# Patient Record
Sex: MALE | Race: WHITE | Employment: FULL TIME | URBAN - NONMETROPOLITAN AREA
[De-identification: names, ages, dates, MRNs, and addresses within clinical notes are randomized per-mention and may not be internally consistent; named-entity substitution may affect disease eponyms.]

---

## 2021-02-13 ENCOUNTER — APPOINTMENT (OUTPATIENT)
Dept: GENERAL RADIOLOGY | Age: 76
DRG: 480 | End: 2021-02-13
Payer: MEDICARE

## 2021-02-13 ENCOUNTER — ANESTHESIA EVENT (OUTPATIENT)
Dept: OPERATING ROOM | Age: 76
DRG: 480 | End: 2021-02-13
Payer: MEDICARE

## 2021-02-13 ENCOUNTER — HOSPITAL ENCOUNTER (INPATIENT)
Age: 76
LOS: 9 days | Discharge: NURSING, REHAB OR LONG-TERM CARE | DRG: 480 | End: 2021-02-22
Attending: EMERGENCY MEDICINE | Admitting: INTERNAL MEDICINE
Payer: MEDICARE

## 2021-02-13 ENCOUNTER — ANESTHESIA (OUTPATIENT)
Dept: OPERATING ROOM | Age: 76
DRG: 480 | End: 2021-02-13
Payer: MEDICARE

## 2021-02-13 VITALS
DIASTOLIC BLOOD PRESSURE: 83 MMHG | TEMPERATURE: 97.2 F | OXYGEN SATURATION: 99 % | RESPIRATION RATE: 11 BRPM | SYSTOLIC BLOOD PRESSURE: 146 MMHG
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
## 2021-02-13 DIAGNOSIS — S72.002A CLOSED FRACTURE OF LEFT HIP, INITIAL ENCOUNTER (HCC): Primary | ICD-10-CM

## 2021-02-13 DIAGNOSIS — I10 ESSENTIAL HYPERTENSION: ICD-10-CM

## 2021-02-13 PROBLEM — R73.9 HYPERGLYCEMIA: Status: ACTIVE | Noted: 2021-02-13

## 2021-02-13 LAB
ABO/RH: NORMAL
ALBUMIN SERPL-MCNC: 3.8 G/DL (ref 3.5–5.2)
ALP BLD-CCNC: 58 U/L (ref 40–130)
ALT SERPL-CCNC: 7 U/L (ref 5–41)
ANION GAP SERPL CALCULATED.3IONS-SCNC: 4 MMOL/L (ref 7–19)
ANTIBODY SCREEN: NORMAL
AST SERPL-CCNC: 13 U/L (ref 5–40)
BASOPHILS ABSOLUTE: 0 K/UL (ref 0–0.2)
BASOPHILS RELATIVE PERCENT: 0.4 % (ref 0–1)
BILIRUB SERPL-MCNC: 0.3 MG/DL (ref 0.2–1.2)
BUN BLDV-MCNC: 19 MG/DL (ref 8–23)
CALCIUM SERPL-MCNC: 8.9 MG/DL (ref 8.8–10.2)
CHLORIDE BLD-SCNC: 102 MMOL/L (ref 98–111)
CO2: 30 MMOL/L (ref 22–29)
CREAT SERPL-MCNC: 0.8 MG/DL (ref 0.5–1.2)
EOSINOPHILS ABSOLUTE: 0.2 K/UL (ref 0–0.6)
EOSINOPHILS RELATIVE PERCENT: 1.9 % (ref 0–5)
GFR AFRICAN AMERICAN: >59
GFR NON-AFRICAN AMERICAN: >60
GLUCOSE BLD-MCNC: 128 MG/DL (ref 74–109)
HBA1C MFR BLD: 5.6 % (ref 4–6)
HCT VFR BLD CALC: 43.5 % (ref 42–52)
HEMOGLOBIN: 13.9 G/DL (ref 14–18)
IMMATURE GRANULOCYTES #: 0.1 K/UL
INR BLD: 1.07 (ref 0.88–1.18)
LIPASE: 11 U/L (ref 13–60)
LYMPHOCYTES ABSOLUTE: 1.4 K/UL (ref 1.1–4.5)
LYMPHOCYTES RELATIVE PERCENT: 13 % (ref 20–40)
MCH RBC QN AUTO: 31.6 PG (ref 27–31)
MCHC RBC AUTO-ENTMCNC: 32 G/DL (ref 33–37)
MCV RBC AUTO: 98.9 FL (ref 80–94)
MONOCYTES ABSOLUTE: 0.5 K/UL (ref 0–0.9)
MONOCYTES RELATIVE PERCENT: 4.7 % (ref 0–10)
NEUTROPHILS ABSOLUTE: 8.4 K/UL (ref 1.5–7.5)
NEUTROPHILS RELATIVE PERCENT: 79.4 % (ref 50–65)
PDW BLD-RTO: 12.2 % (ref 11.5–14.5)
PLATELET # BLD: 171 K/UL (ref 130–400)
PMV BLD AUTO: 10.2 FL (ref 9.4–12.4)
POTASSIUM SERPL-SCNC: 3.8 MMOL/L (ref 3.5–5)
PROTHROMBIN TIME: 13.9 SEC (ref 12–14.6)
RBC # BLD: 4.4 M/UL (ref 4.7–6.1)
REASON FOR REJECTION: NORMAL
REJECTED TEST: NORMAL
SARS-COV-2, NAAT: NOT DETECTED
SODIUM BLD-SCNC: 136 MMOL/L (ref 136–145)
TOTAL PROTEIN: 7.3 G/DL (ref 6.6–8.7)
WBC # BLD: 10.6 K/UL (ref 4.8–10.8)

## 2021-02-13 PROCEDURE — 83036 HEMOGLOBIN GLYCOSYLATED A1C: CPT

## 2021-02-13 PROCEDURE — 7100000000 HC PACU RECOVERY - FIRST 15 MIN: Performed by: ORTHOPAEDIC SURGERY

## 2021-02-13 PROCEDURE — 2580000003 HC RX 258: Performed by: INTERNAL MEDICINE

## 2021-02-13 PROCEDURE — 3600000004 HC SURGERY LEVEL 4 BASE: Performed by: ORTHOPAEDIC SURGERY

## 2021-02-13 PROCEDURE — 85610 PROTHROMBIN TIME: CPT

## 2021-02-13 PROCEDURE — C1713 ANCHOR/SCREW BN/BN,TIS/BN: HCPCS | Performed by: ORTHOPAEDIC SURGERY

## 2021-02-13 PROCEDURE — 36415 COLL VENOUS BLD VENIPUNCTURE: CPT

## 2021-02-13 PROCEDURE — C1769 GUIDE WIRE: HCPCS | Performed by: ORTHOPAEDIC SURGERY

## 2021-02-13 PROCEDURE — U0002 COVID-19 LAB TEST NON-CDC: HCPCS

## 2021-02-13 PROCEDURE — 2580000003 HC RX 258: Performed by: ORTHOPAEDIC SURGERY

## 2021-02-13 PROCEDURE — 73502 X-RAY EXAM HIP UNI 2-3 VIEWS: CPT

## 2021-02-13 PROCEDURE — 3700000000 HC ANESTHESIA ATTENDED CARE: Performed by: ORTHOPAEDIC SURGERY

## 2021-02-13 PROCEDURE — 2709999900 HC NON-CHARGEABLE SUPPLY: Performed by: ORTHOPAEDIC SURGERY

## 2021-02-13 PROCEDURE — 2500000003 HC RX 250 WO HCPCS: Performed by: ANESTHESIOLOGY

## 2021-02-13 PROCEDURE — 85025 COMPLETE CBC W/AUTO DIFF WBC: CPT

## 2021-02-13 PROCEDURE — 99282 EMERGENCY DEPT VISIT SF MDM: CPT

## 2021-02-13 PROCEDURE — 86901 BLOOD TYPING SEROLOGIC RH(D): CPT

## 2021-02-13 PROCEDURE — 93005 ELECTROCARDIOGRAM TRACING: CPT | Performed by: EMERGENCY MEDICINE

## 2021-02-13 PROCEDURE — 83690 ASSAY OF LIPASE: CPT

## 2021-02-13 PROCEDURE — 2780000010 HC IMPLANT OTHER: Performed by: ORTHOPAEDIC SURGERY

## 2021-02-13 PROCEDURE — 6360000002 HC RX W HCPCS: Performed by: ORTHOPAEDIC SURGERY

## 2021-02-13 PROCEDURE — 1210000000 HC MED SURG R&B

## 2021-02-13 PROCEDURE — 3700000001 HC ADD 15 MINUTES (ANESTHESIA): Performed by: ORTHOPAEDIC SURGERY

## 2021-02-13 PROCEDURE — 73552 X-RAY EXAM OF FEMUR 2/>: CPT

## 2021-02-13 PROCEDURE — 3209999900 FLUORO FOR SURGICAL PROCEDURES

## 2021-02-13 PROCEDURE — 86850 RBC ANTIBODY SCREEN: CPT

## 2021-02-13 PROCEDURE — 71045 X-RAY EXAM CHEST 1 VIEW: CPT

## 2021-02-13 PROCEDURE — 86900 BLOOD TYPING SEROLOGIC ABO: CPT

## 2021-02-13 PROCEDURE — 6370000000 HC RX 637 (ALT 250 FOR IP): Performed by: ORTHOPAEDIC SURGERY

## 2021-02-13 PROCEDURE — 2720000010 HC SURG SUPPLY STERILE: Performed by: ORTHOPAEDIC SURGERY

## 2021-02-13 PROCEDURE — 0QS706Z REPOSITION LEFT UPPER FEMUR WITH INTRAMEDULLARY INTERNAL FIXATION DEVICE, OPEN APPROACH: ICD-10-PCS | Performed by: ORTHOPAEDIC SURGERY

## 2021-02-13 PROCEDURE — 2580000003 HC RX 258: Performed by: ANESTHESIOLOGY

## 2021-02-13 PROCEDURE — 6360000002 HC RX W HCPCS: Performed by: ANESTHESIOLOGY

## 2021-02-13 PROCEDURE — 2700000000 HC OXYGEN THERAPY PER DAY

## 2021-02-13 PROCEDURE — 3600000014 HC SURGERY LEVEL 4 ADDTL 15MIN: Performed by: ORTHOPAEDIC SURGERY

## 2021-02-13 PROCEDURE — 80053 COMPREHEN METABOLIC PANEL: CPT

## 2021-02-13 PROCEDURE — 7100000001 HC PACU RECOVERY - ADDTL 15 MIN: Performed by: ORTHOPAEDIC SURGERY

## 2021-02-13 DEVICE — SCREW BNE L40MM DIA5MM ST TIB LT GRN TI ST CANN LOK FULL: Type: IMPLANTABLE DEVICE | Site: FEMUR | Status: FUNCTIONAL

## 2021-02-13 DEVICE — NAIL IM L380MM DIA12MM 130DEG LNG L PROX FEM GRN TI CANN: Type: IMPLANTABLE DEVICE | Site: FEMUR | Status: FUNCTIONAL

## 2021-02-13 DEVICE — IMPLANTABLE DEVICE: Type: IMPLANTABLE DEVICE | Site: FEMUR | Status: FUNCTIONAL

## 2021-02-13 RX ORDER — POTASSIUM CHLORIDE 7.45 MG/ML
10 INJECTION INTRAVENOUS PRN
Status: DISCONTINUED | OUTPATIENT
Start: 2021-02-13 | End: 2021-02-22 | Stop reason: HOSPADM

## 2021-02-13 RX ORDER — LISINOPRIL 20 MG/1
20 TABLET ORAL DAILY
Status: DISCONTINUED | OUTPATIENT
Start: 2021-02-14 | End: 2021-02-22 | Stop reason: HOSPADM

## 2021-02-13 RX ORDER — SODIUM CHLORIDE, SODIUM LACTATE, POTASSIUM CHLORIDE, CALCIUM CHLORIDE 600; 310; 30; 20 MG/100ML; MG/100ML; MG/100ML; MG/100ML
INJECTION, SOLUTION INTRAVENOUS CONTINUOUS
Status: CANCELLED | OUTPATIENT
Start: 2021-02-13

## 2021-02-13 RX ORDER — LISINOPRIL AND HYDROCHLOROTHIAZIDE 25; 20 MG/1; MG/1
1 TABLET ORAL DAILY
Status: DISCONTINUED | OUTPATIENT
Start: 2021-02-14 | End: 2021-02-13

## 2021-02-13 RX ORDER — OXYCODONE HYDROCHLORIDE AND ACETAMINOPHEN 5; 325 MG/1; MG/1
2 TABLET ORAL EVERY 4 HOURS PRN
Status: DISCONTINUED | OUTPATIENT
Start: 2021-02-13 | End: 2021-02-15

## 2021-02-13 RX ORDER — MORPHINE SULFATE 4 MG/ML
1 INJECTION, SOLUTION INTRAMUSCULAR; INTRAVENOUS EVERY 4 HOURS PRN
Status: ACTIVE | OUTPATIENT
Start: 2021-02-13 | End: 2021-02-14

## 2021-02-13 RX ORDER — ONDANSETRON 2 MG/ML
INJECTION INTRAMUSCULAR; INTRAVENOUS PRN
Status: DISCONTINUED | OUTPATIENT
Start: 2021-02-13 | End: 2021-02-13 | Stop reason: SDUPTHER

## 2021-02-13 RX ORDER — ACETAMINOPHEN 650 MG/1
650 SUPPOSITORY RECTAL EVERY 6 HOURS PRN
Status: DISCONTINUED | OUTPATIENT
Start: 2021-02-13 | End: 2021-02-22 | Stop reason: HOSPADM

## 2021-02-13 RX ORDER — ROCURONIUM BROMIDE 10 MG/ML
INJECTION, SOLUTION INTRAVENOUS PRN
Status: DISCONTINUED | OUTPATIENT
Start: 2021-02-13 | End: 2021-02-13 | Stop reason: SDUPTHER

## 2021-02-13 RX ORDER — HYDROMORPHONE HYDROCHLORIDE 1 MG/ML
0.5 INJECTION, SOLUTION INTRAMUSCULAR; INTRAVENOUS; SUBCUTANEOUS EVERY 5 MIN PRN
Status: DISCONTINUED | OUTPATIENT
Start: 2021-02-13 | End: 2021-02-13

## 2021-02-13 RX ORDER — ONDANSETRON 2 MG/ML
4 INJECTION INTRAMUSCULAR; INTRAVENOUS
Status: DISCONTINUED | OUTPATIENT
Start: 2021-02-13 | End: 2021-02-13

## 2021-02-13 RX ORDER — POTASSIUM CHLORIDE 20 MEQ/1
40 TABLET, EXTENDED RELEASE ORAL PRN
Status: DISCONTINUED | OUTPATIENT
Start: 2021-02-13 | End: 2021-02-22 | Stop reason: HOSPADM

## 2021-02-13 RX ORDER — SODIUM CHLORIDE, SODIUM LACTATE, POTASSIUM CHLORIDE, CALCIUM CHLORIDE 600; 310; 30; 20 MG/100ML; MG/100ML; MG/100ML; MG/100ML
INJECTION, SOLUTION INTRAVENOUS CONTINUOUS PRN
Status: DISCONTINUED | OUTPATIENT
Start: 2021-02-13 | End: 2021-02-13 | Stop reason: SDUPTHER

## 2021-02-13 RX ORDER — OXYCODONE HYDROCHLORIDE AND ACETAMINOPHEN 5; 325 MG/1; MG/1
1 TABLET ORAL EVERY 4 HOURS PRN
Status: DISCONTINUED | OUTPATIENT
Start: 2021-02-13 | End: 2021-02-15

## 2021-02-13 RX ORDER — SODIUM CHLORIDE 0.9 % (FLUSH) 0.9 %
10 SYRINGE (ML) INJECTION EVERY 12 HOURS SCHEDULED
Status: CANCELLED | OUTPATIENT
Start: 2021-02-13

## 2021-02-13 RX ORDER — LIDOCAINE HYDROCHLORIDE 10 MG/ML
INJECTION, SOLUTION EPIDURAL; INFILTRATION; INTRACAUDAL; PERINEURAL PRN
Status: DISCONTINUED | OUTPATIENT
Start: 2021-02-13 | End: 2021-02-13 | Stop reason: SDUPTHER

## 2021-02-13 RX ORDER — ACETAMINOPHEN 325 MG/1
650 TABLET ORAL EVERY 6 HOURS PRN
Status: DISCONTINUED | OUTPATIENT
Start: 2021-02-13 | End: 2021-02-22 | Stop reason: HOSPADM

## 2021-02-13 RX ORDER — PROPOFOL 10 MG/ML
INJECTION, EMULSION INTRAVENOUS PRN
Status: DISCONTINUED | OUTPATIENT
Start: 2021-02-13 | End: 2021-02-13 | Stop reason: SDUPTHER

## 2021-02-13 RX ORDER — SENNA AND DOCUSATE SODIUM 50; 8.6 MG/1; MG/1
1 TABLET, FILM COATED ORAL 2 TIMES DAILY
Status: DISCONTINUED | OUTPATIENT
Start: 2021-02-13 | End: 2021-02-22 | Stop reason: HOSPADM

## 2021-02-13 RX ORDER — HYDROCHLOROTHIAZIDE 25 MG/1
25 TABLET ORAL DAILY
Status: DISCONTINUED | OUTPATIENT
Start: 2021-02-14 | End: 2021-02-22 | Stop reason: HOSPADM

## 2021-02-13 RX ORDER — MORPHINE SULFATE 4 MG/ML
4 INJECTION, SOLUTION INTRAMUSCULAR; INTRAVENOUS ONCE
Status: DISCONTINUED | OUTPATIENT
Start: 2021-02-13 | End: 2021-02-13

## 2021-02-13 RX ORDER — SODIUM CHLORIDE 0.9 % (FLUSH) 0.9 %
10 SYRINGE (ML) INJECTION PRN
Status: CANCELLED | OUTPATIENT
Start: 2021-02-13

## 2021-02-13 RX ORDER — HYDROMORPHONE HYDROCHLORIDE 1 MG/ML
0.25 INJECTION, SOLUTION INTRAMUSCULAR; INTRAVENOUS; SUBCUTANEOUS EVERY 5 MIN PRN
Status: DISCONTINUED | OUTPATIENT
Start: 2021-02-13 | End: 2021-02-13

## 2021-02-13 RX ORDER — DEXAMETHASONE SODIUM PHOSPHATE 10 MG/ML
INJECTION, SOLUTION INTRAMUSCULAR; INTRAVENOUS PRN
Status: DISCONTINUED | OUTPATIENT
Start: 2021-02-13 | End: 2021-02-13 | Stop reason: SDUPTHER

## 2021-02-13 RX ORDER — SODIUM CHLORIDE 0.9 % (FLUSH) 0.9 %
10 SYRINGE (ML) INJECTION EVERY 12 HOURS SCHEDULED
Status: DISCONTINUED | OUTPATIENT
Start: 2021-02-13 | End: 2021-02-22 | Stop reason: HOSPADM

## 2021-02-13 RX ORDER — SODIUM CHLORIDE 0.9 % (FLUSH) 0.9 %
10 SYRINGE (ML) INJECTION PRN
Status: DISCONTINUED | OUTPATIENT
Start: 2021-02-13 | End: 2021-02-22 | Stop reason: HOSPADM

## 2021-02-13 RX ORDER — FENTANYL CITRATE 50 UG/ML
INJECTION, SOLUTION INTRAMUSCULAR; INTRAVENOUS PRN
Status: DISCONTINUED | OUTPATIENT
Start: 2021-02-13 | End: 2021-02-13 | Stop reason: SDUPTHER

## 2021-02-13 RX ORDER — ONDANSETRON 2 MG/ML
4 INJECTION INTRAMUSCULAR; INTRAVENOUS EVERY 6 HOURS PRN
Status: DISCONTINUED | OUTPATIENT
Start: 2021-02-13 | End: 2021-02-22 | Stop reason: HOSPADM

## 2021-02-13 RX ORDER — PROMETHAZINE HYDROCHLORIDE 12.5 MG/1
12.5 TABLET ORAL EVERY 6 HOURS PRN
Status: DISCONTINUED | OUTPATIENT
Start: 2021-02-13 | End: 2021-02-22 | Stop reason: HOSPADM

## 2021-02-13 RX ORDER — ONDANSETRON 2 MG/ML
4 INJECTION INTRAMUSCULAR; INTRAVENOUS ONCE
Status: DISCONTINUED | OUTPATIENT
Start: 2021-02-13 | End: 2021-02-22 | Stop reason: HOSPADM

## 2021-02-13 RX ORDER — FENTANYL CITRATE 50 UG/ML
50 INJECTION, SOLUTION INTRAMUSCULAR; INTRAVENOUS EVERY 5 MIN PRN
Status: DISCONTINUED | OUTPATIENT
Start: 2021-02-13 | End: 2021-02-13

## 2021-02-13 RX ADMIN — SUGAMMADEX 150 MG: 100 INJECTION, SOLUTION INTRAVENOUS at 14:45

## 2021-02-13 RX ADMIN — DEXAMETHASONE SODIUM PHOSPHATE 10 MG: 10 INJECTION, SOLUTION INTRAMUSCULAR; INTRAVENOUS at 13:49

## 2021-02-13 RX ADMIN — DOCUSATE SODIUM AND SENNOSIDES 1 TABLET: 8.6; 5 TABLET, FILM COATED ORAL at 22:16

## 2021-02-13 RX ADMIN — SODIUM CHLORIDE, PRESERVATIVE FREE 10 ML: 5 INJECTION INTRAVENOUS at 22:19

## 2021-02-13 RX ADMIN — ONDANSETRON HYDROCHLORIDE 4 MG: 2 INJECTION, SOLUTION INTRAMUSCULAR; INTRAVENOUS at 14:43

## 2021-02-13 RX ADMIN — PHENYLEPHRINE HYDROCHLORIDE 80 MCG: 10 INJECTION INTRAVENOUS at 14:32

## 2021-02-13 RX ADMIN — ROCURONIUM BROMIDE 50 MG: 10 INJECTION, SOLUTION INTRAVENOUS at 13:42

## 2021-02-13 RX ADMIN — CEFAZOLIN SODIUM 2000 MG: 1 INJECTION, POWDER, FOR SOLUTION INTRAMUSCULAR; INTRAVENOUS at 13:54

## 2021-02-13 RX ADMIN — LIDOCAINE HYDROCHLORIDE 50 MG: 10 INJECTION, SOLUTION EPIDURAL; INFILTRATION; INTRACAUDAL; PERINEURAL at 13:42

## 2021-02-13 RX ADMIN — PHENYLEPHRINE HYDROCHLORIDE 80 MCG: 10 INJECTION INTRAVENOUS at 14:46

## 2021-02-13 RX ADMIN — FENTANYL CITRATE 150 MCG: 50 INJECTION, SOLUTION INTRAMUSCULAR; INTRAVENOUS at 14:19

## 2021-02-13 RX ADMIN — PROPOFOL 150 MG: 10 INJECTION, EMULSION INTRAVENOUS at 13:42

## 2021-02-13 RX ADMIN — Medication 2000 MG: at 22:19

## 2021-02-13 RX ADMIN — FENTANYL CITRATE 100 MCG: 50 INJECTION, SOLUTION INTRAMUSCULAR; INTRAVENOUS at 13:42

## 2021-02-13 RX ADMIN — PHENYLEPHRINE HYDROCHLORIDE 80 MCG: 10 INJECTION INTRAVENOUS at 14:09

## 2021-02-13 RX ADMIN — SODIUM CHLORIDE, SODIUM LACTATE, POTASSIUM CHLORIDE, AND CALCIUM CHLORIDE: 600; 310; 30; 20 INJECTION, SOLUTION INTRAVENOUS at 13:39

## 2021-02-13 ASSESSMENT — PAIN DESCRIPTION - LOCATION: LOCATION: HIP

## 2021-02-13 ASSESSMENT — ENCOUNTER SYMPTOMS
COUGH: 0
ABDOMINAL PAIN: 0
SHORTNESS OF BREATH: 0
VOMITING: 0

## 2021-02-13 ASSESSMENT — LIFESTYLE VARIABLES: SMOKING_STATUS: 1

## 2021-02-13 ASSESSMENT — PAIN DESCRIPTION - ORIENTATION: ORIENTATION: LEFT

## 2021-02-13 NOTE — OP NOTE
Patient Name: Elizabeth Muniz  MRN: [id number]  : 1945    DATE of SURGERY: 2021    SURGEON: Reese Madodx MD    ASSISTANT: None     PREOPERATIVE DIAGNOSIS:   1. Acute traumatic displaced comminuted intertrochanteric fracture of the Left femur, initial encounter for closed fracture  2. Hypertension  3. Chronic tobacco use    POSTOPERATIVE DIAGNOSIS:   1. Acute traumatic displaced comminuted intertrochanteric fracture of the Left femur, initial encounter for closed fracture  2. Hypertension  3. Chronic tobacco use    PROCEDURE PERFORMED:   1. Cephalomedullary Nailing Left closed displaced Intertrochanteric hip fracture      IMPLANTS: Synthes TFN size [id number]    ANESTHESIA USED: General endotrachial anesthesia    OPERATIVE INDICATIONS: 67 y.o. male status post fall with the above named diagnosis. Surgical indications include fracture displacement, stabilization of fracture, and mobilization of the patient. Risks include, but are not limited to, anesthesia, bleeding, infection, pain, damage to local structures, need for further surgery, malunion, nonunion, fracture displacement, failure of hardware, intraoperative death. Risks, benefits, and alternative were discussed and the patient wishes to proceed with surgery. ANTIBIOTICS: 2g Ancef IV within one hour of incision    ESTIMATED BLOOD LOSS:  100mL    DRAINS: None     COMPLICATIONS: No intra-operative complications    SPECIMENS: None    PROCEDURE in DETAIL:  The patient was seen in the preoperative holding room, the informed consent was reviewed and signed, and the correct operative extremity marked with the patients agreement. The patient was transported to the operating room, where a timeout was performed identifying the correct patient and operative site. Perioperative antibiotics were administered prior to incision. Once anesthetized, the operative extremity was placed into a well-padded boot and the patient was positioned on the fracture table. The nonoperative extremity was placed into a well leg solorio with care to provide padding to the lower extremity, especially over the fibular head and peroneal nerve. Patient was secured to the table and all bony prominences were padded. Traction and internal rotation were applied to the operative extremity and the fracture was noted to adequately align on AP and lateral fluoroscopy. A sterile prep and drape was then performed. A guidepin was placed at the tip of the greater trochanter on the AP plane and in line with the intramedullary canal on the lateral view. The wire was advanced to the level of the lesser trochanter followed by introduction of the starting reamer. A long ball-tipped guidewire was then placed down the intramedullary canal to the distal physeal scar. Using fluoroscopy, the appropriate nail length was then measured. Sequential reaming was then commenced beginning at size 8.5 mm and ending at 13.5 mm after adequate chatter was appreciated. The nail of choice was then placed into the intramedullary canal.  Utilizing the attachment jig, a guidepin was then placed into the central aspect of the femoral head on the AP and posterior aspect of the femoral head on the lateral views. Length was measured for the spiral blade and the path of the blade was drilled to the appropriate depth. The spiral blade was placed without complication and the set screw was placed proximally, loosened a 1/2 turn to allow for compression of the fracture. Attention was then turned to the distal interlock screws. Using a freehand perfect [place] technique and using fluoroscopy, a distal interlock screw was placed uneventfully. C-arm images were used in multiple planes showing adequate alignment of the fracture without hardware complication. Incisions were irrigated, closed in layers, and the skin was stapled.   A sterile dressing was applied, the patient awakened, transported the recovery room in stable condition. Patient tolerated the procedure well and was without intraoperative complication. All counts at the end of the procedure were correct. POSTOPERATIVE PLAN:  1) PWB, 50% WB  2) DVT prophylaxis x 4 weeks  3) PT/OT  4) Patient will follow-up in 2 weeks from surgery.   5) Pain control     Electronically signed by Shila Suggs MD on 2/13/2021 at 1:43 PM

## 2021-02-13 NOTE — ED NOTES
Bed: 04  Expected date:   Expected time:   Means of arrival:   Comments:  Hip johnie Coto RN  02/13/21 1112

## 2021-02-13 NOTE — H&P
range of motion. General: No swelling, deformity or signs of injury. Right lower leg: No edema. Left lower leg: No edema. Skin:     General: Skin is warm and dry. Capillary Refill: Capillary refill takes less than 2 seconds. Coloration: Skin is not jaundiced or pale. Findings: No bruising, erythema, lesion or rash. Neurological:      General: No focal deficit present. Mental Status: He is alert and oriented to person, place, and time. Cranial Nerves: No cranial nerve deficit. Sensory: No sensory deficit. Motor: No weakness. Coordination: Coordination normal.   Psychiatric:         Mood and Affect: Mood normal.         Behavior: Behavior normal.         Thought Content: Thought content normal.         Judgment: Judgment normal.               DIAGNOSTIC STUDIES:    I have reviewedLaboratory and Imaging data report today. Recent Labs     02/13/21  1144   WBC 10.6   HGB 13.9*        Recent Labs     02/13/21  1144      K 3.8      CO2 30*   BUN 19   CREATININE 0.8   GLUCOSE 128*   AST 13   ALT 7   BILITOT 0.3   ALKPHOS 58     Troponin T: No results for input(s): TROPONINI in the last 72 hours. Pro-BNP: No results found for: BNP  Lactate: No results found for: LACTA      ABGs: No results found for: PHART, PO2ART, [id number]  INR:   Recent Labs     02/13/21  1144   INR 1.07     TSH: No results found for: TSH, TSHREFLEX  URINALYSIS:No results for input(s): NITRITE, COLORU, PHUR, LABCAST, WBCUA, RBCUA, MUCUS, TRICHOMONAS, YEAST, BACTERIA, CLARITYU, SPECGRAV, LEUKOCYTESUR, UROBILINOGEN, BILIRUBINUR, BLOODU, GLUCOSEU, AMORPHOUS in the last 72 hours. Invalid input(s): Memory [place] / IMAGING REPORTS:     Xr Chest Portable    Result Date: 2/13/2021  EXAMINATION: XR CHEST PORTABLE 2/13/2021 12:16 PM HISTORY: Fall, pain. Report: A portable frontal view of the chest was obtained.  COMPARISON: There are no correlative imaging studies for comparison. The lungs are clear, well expanded. Heart size is normal. There is mild ectasia of the thoracic aorta. No pneumothorax or significant pleural effusion is identified. The osseous structures and upper abdomen are unremarkable. No acute cardiopulmonary abnormality. Signed by Dr Lorelei Beckham on 2/13/2021 12:18 PM    Xr Hip 2-3 Vw W Pelvis Left    Result Date: 2/13/2021  EXAMINATION: XR HIP 2-3 VW W PELVIS LEFT 2/13/2021 12:14 PM HISTORY: Fall, left hip pain. Report: An AP view the pelvis was obtained as well as AP and crosstable lateral views of the left hip. COMPARISON: There are no correlative imaging studies for comparison. The right hip is unremarkable. There is an acute  intratrochanteric left hip fracture oriented vertically, with mild lateral displacement, no significant angulation or involvement of the femoral head. The other pelvic osseous structures appear unremarkable. Acute mildly displaced closed intratrochanteric left hip fracture as described. Signed by Dr Lorelei Pinedo. Bhavani on 2/13/2021 12:16 PM           PATIENT SUMMARY      ASSESSMENT / IMPRESSION & PLAN:      Patient Active Problem List    Diagnosis Date Noted    Hip fracture requiring operative repair, left, closed, initial encounter ([facility] [address] 02/13/2021     Priority: High    Hyperglycemia 02/13/2021    Fall from slipping on ice 02/13/2021   [facility] Hypertension        Hospital Problems           Last Modified POA    * (Principal) Hip fracture requiring operative repair, left, closed, initial encounter ([facility] [address] 2/13/2021 Yes    Hyperglycemia 2/13/2021 Yes    Hypertension 2/13/2021 Yes    Fall from slipping on ice 2/13/2021 Yes          Principal Problem:    Hip fracture requiring operative repair, left, closed, initial encounter [facility])  Active Problems:    Hyperglycemia    Hypertension    Fall from slipping on ice  Resolved Problems:    * No resolved hospital problems. *            Plan  1. Admit to: MedSurg  2.  Serial vitals, telemetry,  3. Diet: N.p.o.,   4. Activity: Bedrest.  5. IVF: Normal saline  6. Follow labs: Routine labs  7. Consults: Orthopedic surgery  8. Start medications: Home medications after reconciliation as well as, pain regimen. 9. Oxygen supplementation as needed to keep SPO2 over 92%         Acute mildly displaced closed intratrochanteric left hip  Fracture  · Status post mechanical fall from slipping on ice  · Orthopedics consulted from ER  · N.p.o.  · Continue symptomatic and supportive management including pain management as needed. · Further management as per orthopedic condition. History of hypertension  · Continue home regimen  · Lisinopril-HCTZ 20-25 mg p.o. daily    Hyperglycemia  · No documented history of diabetes  · Monitor POC glucose  · Follow-up hemoglobin A1c level      Continue management of other chronic medical conditions - Please see orders above         CONSULTS:    IP CONSULT TO ORTHOPEDIC SURGERY  IP CONSULT TO SOCIAL WORK        INPATIENT CHECKLIST:      Nutrition: NPO    Prophylaxis Orders:   VTE - enoxaparin     CODE STATUS: FULL  ISOLATION:       DISCHARGE PLAN: tbd     Total face-to-face time spent with this patient, time spent reviewing medical records, and in coordination of care with the emergency department physician, nursing staff, in the examination, evaluation/assessment, counseling, review of medications and plan, was 50 mins . Electronically signed by   Shira Watson MD, MPH  Internal Medicine Hospitalist   2/13/2021 4:14 PM      EMR Dragon/Transcription disclaimer:   Much of this encounter note is an electronic transcription/translation of spoken language to printed text.  The electronic translation of spoken language may permit erroneous, or at times, nonsensical words or phrases to be inadvertently transcribed; although attempts have made to review the note for such errors, some may still exist.

## 2021-02-13 NOTE — CONSULTS
Orthopaedic Inpatient Consultation    NAME:  Marck Damon   : 1945  MRN: [id number]    2021 11:14 AM    Requesting Physician: Dr. Marcial Suarez: Left hip pain      HISTORY OF PRESENT ILLNESS:   Mr. Meggan Salter is a 78-year-old gentleman who presented to the ER today after a mechanical, ground-level fall-he slipped on the ice landing on his left hip. He had immediate onset left hip pain with inability to bear weight. He denies hitting his head or loss of consciousness. No other extremity injury or discomfort. Radiographs in the ER revealed a comminuted peritrochanteric left femur fracture. Orthopedics was consulted for further evaluation. On interview, he localizes pain about the left hip. States pain is better at rest but worse with attempted motion. Denies numbness or tingling to the left lower extremity. Denies pain to remaining extremities. Denies active chest pain, shortness of breath, neck or back pain. Prior to the fall, he was an independent ambulator. Chronic tobacco user. Past Medical History:        Diagnosis Date    Hypertension        Past Surgical History:    History reviewed. No pertinent surgical history. Current Medications:   Prior to Admission medications    Medication Sig Start Date End Date Taking? Authorizing Provider   lisinopril-hydrochlorothiazide (PRINZIDE;ZESTORETIC) 20-25 MG per tablet Take 1 tablet by mouth daily    Historical Provider, MD   cloNIDine (CATAPRES) 0.1 MG tablet Take 0.1 mg by mouth 2 times daily as needed for High Blood Pressure (for sbp >057 or Diastolic >50)    Historical Provider, MD   aspirin 81 MG tablet Take 81 mg by mouth daily    Historical Provider, MD       Allergies:  Patient has no known allergies.     Social History:   Social History     Socioeconomic History    Marital status:      Spouse name: Not on file    Number of children: Not on file    Years of education: Not on file    Highest education level: Not on file   Occupational History    Not on file   Social Needs    Financial resource strain: Not on file    Food insecurity     Worry: Not on file     Inability: Not on file    Transportation needs     Medical: Not on file     Non-medical: Not on file   Tobacco Use    Smoking status: Current Every Day Smoker     Packs/day: 1.00   Substance and Sexual Activity    Alcohol use: No    Drug use: No    Sexual activity: Not on file   Lifestyle    Physical activity     Days per week: Not on file     Minutes per session: Not on file    Stress: Not on file   Relationships    Social connections     Talks on phone: Not on file     Gets together: Not on file     Attends Jewish service: Not on file     Active member of club or organization: Not on file     Attends meetings of clubs or organizations: Not on file     Relationship status: Not on file    Intimate partner violence     Fear of current or ex partner: Not on file     Emotionally abused: Not on file     Physically abused: Not on file     Forced sexual activity: Not on file   Other Topics Concern    Not on file   Social History Narrative    Not on file       Family History:   History reviewed. No pertinent family history. REVIEW OF SYSTEMS:  14 point review of systems has been reviewed from the patient's emergency room visit, reviewed with the patient on today's date with no new changes. PHYSICAL EXAM:      Physical Examination:  Vitals:   Vitals:    02/13/21 1203 02/13/21 1221 02/13/21 1236   BP: 117/81 (!) 180/99 (!) 179/101   Pulse: 77 74 73   Resp: 22 19 24   SpO2: 96% 95% 95%     General:  Appears stated age, no distress. Orientation:  Alert and oriented to time, place, and person. Mood and Affect:  Cooperative and pleasant. HEENT: Head is normocephalic, atraumatic. No gross visual or auditory acuity deficits. Gait:  Resting comfortably in bed. Cardiovascular:  Symmetric 1-2 plus pulses in upper and lower extremities.   Sensation: Grossly intact to light touch. Coordination/balance:  Normal  Musculoskeletal:  Right upper extremity exam:  There is no tenderness to palpation about the shoulder, elbow, wrist or hand. Unrestricted full function motion is present. Stability is normal with provocative tests, 5/5 strength, and skin is normal.      Left upper extremity exam:  There is no tenderness to palpation about the shoulder, elbow, wrist or hand. Unrestricted full function motion is present. Stability is normal with provocative tests, 5/5 strength, and skin is normal.     Right lower extremity exam:  There is no tenderness to palpation about the hip, knee, ankle or foot. Unrestricted full function motion is present. Stability is normal with provocative tests, 5/5 strength, and skin is normal.     Left lower extremity exam: Left lower extremity is shortened and externally rotated. No significant edema, erythema or ecchymosis. No open skin wounds or lesions. Palpation about the left proximal femur was deferred. No significant tenderness to the distal thigh, knee, tibia/fibula, ankle or foot. EHL, FHL, tibialis anterior and gastrocsoleus complex motor intact. Gross sensation is intact to the left foot. Left foot is warm and perfused.     DATA:    CBC with Differential:    Lab Results   Component Value Date    WBC 10.6 02/13/2021    RBC 4.40 02/13/2021    HGB 13.9 02/13/2021    HCT 43.5 02/13/2021     02/13/2021    MCV 98.9 02/13/2021    MCH 31.6 02/13/2021    MCHC 32.0 02/13/2021    RDW 12.2 02/13/2021    LYMPHOPCT 13.0 02/13/2021    MONOPCT 4.7 02/13/2021    BASOPCT 0.4 02/13/2021    MONOSABS 0.50 02/13/2021    LYMPHSABS 1.4 02/13/2021    EOSABS 0.20 02/13/2021    BASOSABS 0.00 02/13/2021     CMP:    Lab Results   Component Value Date     02/13/2021    K 3.8 02/13/2021     02/13/2021    CO2 30 02/13/2021    BUN 19 02/13/2021    CREATININE 0.8 02/13/2021    GFRAA >59 02/13/2021    LABGLOM >60 02/13/2021    GLUCOSE 128 02/13/2021    PROT 7.3 02/13/2021    CALCIUM 8.9 02/13/2021    BILITOT 0.3 02/13/2021    ALKPHOS 58 02/13/2021    AST 13 02/13/2021    ALT 7 02/13/2021     BMP:    Lab Results   Component Value Date     02/13/2021    K 3.8 02/13/2021     02/13/2021    CO2 30 02/13/2021    BUN 19 02/13/2021    CREATININE 0.8 02/13/2021    CALCIUM 8.9 02/13/2021    GFRAA >59 02/13/2021    LABGLOM >60 02/13/2021    GLUCOSE 128 02/13/2021         Radiology: I have reviewed the radiology images listed below and agree with the findings of the interpreting radiologist(s). Xr Chest Portable    Result Date: 2/13/2021  EXAMINATION: XR CHEST PORTABLE 2/13/2021 12:16 PM HISTORY: Fall, pain. Report: A portable frontal view of the chest was obtained. COMPARISON: There are no correlative imaging studies for comparison. The lungs are clear, well expanded. Heart size is normal. There is mild ectasia of the thoracic aorta. No pneumothorax or significant pleural effusion is identified. The osseous structures and upper abdomen are unremarkable. No acute cardiopulmonary abnormality. Signed by Dr Angella Beckham on 2/13/2021 12:18 PM    Xr Hip 2-3 Vw W Pelvis Left    Result Date: 2/13/2021  EXAMINATION: XR HIP 2-3 VW W PELVIS LEFT 2/13/2021 12:14 PM HISTORY: Fall, left hip pain. Report: An AP view the pelvis was obtained as well as AP and crosstable lateral views of the left hip. COMPARISON: There are no correlative imaging studies for comparison. The right hip is unremarkable. There is an acute  intratrochanteric left hip fracture oriented vertically, with mild lateral displacement, no significant angulation or involvement of the femoral head. The other pelvic osseous structures appear unremarkable. Acute mildly displaced closed intratrochanteric left hip fracture as described. Signed by Dr Angella Beckham on 2/13/2021 12:16 PM    --------------------------------------------------------------------------------------------------------------------    Assessment:   24-year-old male status post fall with a left peritrochanteric proximal femur fracture, significant medical history for hypertension and chronic tobacco use    Plan:  1) Left hip fracture: Discussed clinical and radiographic findings with the patient and his brother, over the phone. Discussed treatment options consisting of conservative and operative intervention. He elected to proceed with operative intervention with which I agree. Risks including, but not limited to, bleeding, infection, nonunion, malunion, hardware failure, hardware prominence, ongoing pain, need for additional procedures, DVT/PE and issues with anesthesia-including death, were discussed. All questions were answered preoperatively. Written informed consent were obtained.   2) Admit postop for pain control, PT/OT  3) Discussed smoking cessation  4) Disposition: Pending postoperative course     Electronically signed by Joy Walker MD on 2/13/2021 at 1:12 PM

## 2021-02-13 NOTE — ANESTHESIA PRE PROCEDURE
Department of Anesthesiology  Preprocedure Note       Name:  Debra Costello   Age:  67 y.o.  :  1945                                          MRN:  [id number]         Date:  2021      Surgeon: Betty Srivastava):  Ashli Mayo MD    Procedure: Procedure(s): FEMUR IM NAIL GILDARDO INSERTION    Medications prior to admission:   Prior to Admission medications    Medication Sig Start Date End Date Taking? Authorizing Provider   lisinopril-hydrochlorothiazide (PRINZIDE;ZESTORETIC) 20-25 MG per tablet Take 1 tablet by mouth daily    Historical Provider, MD   cloNIDine (CATAPRES) 0.1 MG tablet Take 0.1 mg by mouth 2 times daily as needed for High Blood Pressure (for sbp >547 or Diastolic >49)    Historical Provider, MD   aspirin 81 MG tablet Take 81 mg by mouth daily    Historical Provider, MD       Current medications:    Current Facility-Administered Medications   Medication Dose Route Frequency Provider Last Rate Last Admin    morphine injection 4 mg  4 mg Intravenous Once Yasmeen Aguilar MD        ondansetron [facility] injection 4 mg  4 mg Intravenous Once Yasmeen Aguilar MD         Current Outpatient Medications   Medication Sig Dispense Refill    lisinopril-hydrochlorothiazide (PRINZIDE;ZESTORETIC) 20-25 MG per tablet Take 1 tablet by mouth daily      cloNIDine (CATAPRES) 0.1 MG tablet Take 0.1 mg by mouth 2 times daily as needed for High Blood Pressure (for sbp >559 or Diastolic >90)      aspirin 81 MG tablet Take 81 mg by mouth daily         Allergies:  No Known Allergies    Problem List:    Patient Active Problem List   Diagnosis Code    [facility] operative repair, left[facility] S72.002A    Hyperglycemia R73.9    Hypertension I10    Fall from slipping on ice [email or web address]       Past Medical History:        Diagnosis Date    Hypertension        Past Surgical History:  History reviewed. No pertinent surgical history.     Social History:    Social History     Tobacco Use  Smoking status: Current Every Day Smoker     Packs/day: 1.00   Substance Use Topics    Alcohol use: No                                Ready to quit: Not Answered  Counseling given: Not Answered      Vital Signs (Current):   Vitals:    02/13/21 1203 02/13/21 1221 02/13/21 1236   BP: 117/81 (!) 180/99 (!) 179/101   Pulse: 77 74 73   Resp: 22 19 24   SpO2: 96% 95% 95%                                              BP Readings from Last 3 Encounters:   02/13/21 (!) 179/101   11/18/16 140/87   08/25/16 182/90       NPO Status:                                                                                 BMI:   Wt Readings from Last 3 Encounters:   11/18/16 185 lb (83.9 kg)   08/25/16 194 lb 6.4 oz (88.2 kg)     There is no height or weight on file to calculate BMI.    CBC:   Lab Results   Component Value Date    WBC 10.6 02/13/2021    RBC 4.40 02/13/2021    HGB 13.9 02/13/2021    HCT 43.5 02/13/2021    MCV 98.9 02/13/2021    RDW 12.2 02/13/2021     02/13/2021       CMP:   Lab Results   Component Value Date     02/13/2021    K 3.8 02/13/2021     02/13/2021    CO2 30 02/13/2021    BUN 19 02/13/2021    CREATININE 0.8 02/13/2021    GFRAA >59 02/13/2021    LABGLOM >60 02/13/2021    GLUCOSE 128 02/13/2021    PROT 7.3 02/13/2021    CALCIUM 8.9 02/13/2021    BILITOT 0.3 02/13/2021    ALKPHOS 58 02/13/2021    AST 13 02/13/2021    ALT 7 02/13/2021       POC Tests: No results for input(s): POCGLU, POCNA, POCK, POCCL, POCBUN, POCHEMO, POCHCT in the last 72 hours.     Coags:   Lab Results   Component Value Date    PROTIME 13.9 02/13/2021    INR 1.07 02/13/2021       HCG (If Applicable): No results found for: PREGTESTUR, PREGSERUM, HCG, HCGQUANT     ABGs: No results found for: PHART, PO2ART, CLW4GDO, YKG3NRU, BEART, [id number]     Type & Screen (If Applicable):  No results found for: LABABO, LABRH    Drug/Infectious Status (If Applicable):  No results found for: HIV, HEPCAB    COVID-19 Screening (If Applicable):

## 2021-02-14 ENCOUNTER — APPOINTMENT (OUTPATIENT)
Dept: CT IMAGING | Age: 76
DRG: 480 | End: 2021-02-14
Payer: MEDICARE

## 2021-02-14 LAB
ALBUMIN SERPL-MCNC: 3.5 G/DL (ref 3.5–5.2)
ALP BLD-CCNC: 49 U/L (ref 40–130)
ALT SERPL-CCNC: 8 U/L (ref 5–41)
ANION GAP SERPL CALCULATED.3IONS-SCNC: 12 MMOL/L (ref 7–19)
ANION GAP SERPL CALCULATED.3IONS-SCNC: 9 MMOL/L (ref 7–19)
AST SERPL-CCNC: 18 U/L (ref 5–40)
BASOPHILS ABSOLUTE: 0 K/UL (ref 0–0.2)
BASOPHILS ABSOLUTE: 0 K/UL (ref 0–0.2)
BASOPHILS RELATIVE PERCENT: 0.1 % (ref 0–1)
BASOPHILS RELATIVE PERCENT: 0.1 % (ref 0–1)
BILIRUB SERPL-MCNC: 0.6 MG/DL (ref 0.2–1.2)
BUN BLDV-MCNC: 24 MG/DL (ref 8–23)
BUN BLDV-MCNC: 31 MG/DL (ref 8–23)
CALCIUM SERPL-MCNC: 8.6 MG/DL (ref 8.8–10.2)
CALCIUM SERPL-MCNC: 8.8 MG/DL (ref 8.8–10.2)
CHLORIDE BLD-SCNC: 94 MMOL/L (ref 98–111)
CHLORIDE BLD-SCNC: 98 MMOL/L (ref 98–111)
CO2: 24 MMOL/L (ref 22–29)
CO2: 27 MMOL/L (ref 22–29)
CREAT SERPL-MCNC: 1 MG/DL (ref 0.5–1.2)
CREAT SERPL-MCNC: 1.3 MG/DL (ref 0.5–1.2)
EOSINOPHILS ABSOLUTE: 0 K/UL (ref 0–0.6)
EOSINOPHILS ABSOLUTE: 0 K/UL (ref 0–0.6)
EOSINOPHILS RELATIVE PERCENT: 0 % (ref 0–5)
EOSINOPHILS RELATIVE PERCENT: 0.1 % (ref 0–5)
GFR AFRICAN AMERICAN: >59
GFR AFRICAN AMERICAN: >59
GFR NON-AFRICAN AMERICAN: 54
GFR NON-AFRICAN AMERICAN: >60
GLUCOSE BLD-MCNC: 126 MG/DL (ref 70–99)
GLUCOSE BLD-MCNC: 138 MG/DL (ref 70–99)
GLUCOSE BLD-MCNC: 145 MG/DL (ref 74–109)
GLUCOSE BLD-MCNC: 154 MG/DL (ref 70–99)
GLUCOSE BLD-MCNC: 161 MG/DL (ref 74–109)
HCT VFR BLD CALC: 34.2 % (ref 42–52)
HCT VFR BLD CALC: 36.4 % (ref 42–52)
HEMOGLOBIN: 11.1 G/DL (ref 14–18)
HEMOGLOBIN: 11.4 G/DL (ref 14–18)
IMMATURE GRANULOCYTES #: 0 K/UL
IMMATURE GRANULOCYTES #: 0.1 K/UL
LACTIC ACID: 1 MMOL/L (ref 0.5–1.9)
LACTIC ACID: 2.3 MMOL/L (ref 0.5–1.9)
LYMPHOCYTES ABSOLUTE: 1.2 K/UL (ref 1.1–4.5)
LYMPHOCYTES ABSOLUTE: 1.4 K/UL (ref 1.1–4.5)
LYMPHOCYTES RELATIVE PERCENT: 7.5 % (ref 20–40)
LYMPHOCYTES RELATIVE PERCENT: 9.2 % (ref 20–40)
MCH RBC QN AUTO: 31.1 PG (ref 27–31)
MCH RBC QN AUTO: 32.1 PG (ref 27–31)
MCHC RBC AUTO-ENTMCNC: 31.3 G/DL (ref 33–37)
MCHC RBC AUTO-ENTMCNC: 32.5 G/DL (ref 33–37)
MCV RBC AUTO: 98.8 FL (ref 80–94)
MCV RBC AUTO: 99.5 FL (ref 80–94)
MONOCYTES ABSOLUTE: 1 K/UL (ref 0–0.9)
MONOCYTES ABSOLUTE: 1.1 K/UL (ref 0–0.9)
MONOCYTES RELATIVE PERCENT: 6.8 % (ref 0–10)
MONOCYTES RELATIVE PERCENT: 7 % (ref 0–10)
NEUTROPHILS ABSOLUTE: 12.6 K/UL (ref 1.5–7.5)
NEUTROPHILS ABSOLUTE: 13.7 K/UL (ref 1.5–7.5)
NEUTROPHILS RELATIVE PERCENT: 83.6 % (ref 50–65)
NEUTROPHILS RELATIVE PERCENT: 84.9 % (ref 50–65)
PDW BLD-RTO: 12.2 % (ref 11.5–14.5)
PDW BLD-RTO: 12.3 % (ref 11.5–14.5)
PERFORMED ON: ABNORMAL
PLATELET # BLD: 150 K/UL (ref 130–400)
PLATELET # BLD: 170 K/UL (ref 130–400)
PMV BLD AUTO: 10.7 FL (ref 9.4–12.4)
PMV BLD AUTO: 10.9 FL (ref 9.4–12.4)
POTASSIUM REFLEX MAGNESIUM: 4.9 MMOL/L (ref 3.5–5)
POTASSIUM SERPL-SCNC: 4.5 MMOL/L (ref 3.5–5)
PROCALCITONIN: 0.16 NG/ML (ref 0–0.09)
RBC # BLD: 3.46 M/UL (ref 4.7–6.1)
RBC # BLD: 3.66 M/UL (ref 4.7–6.1)
SODIUM BLD-SCNC: 130 MMOL/L (ref 136–145)
SODIUM BLD-SCNC: 134 MMOL/L (ref 136–145)
TOTAL PROTEIN: 6.7 G/DL (ref 6.6–8.7)
VITAMIN B-12: <150 PG/ML (ref 211–946)
WBC # BLD: 15.1 K/UL (ref 4.8–10.8)
WBC # BLD: 16.1 K/UL (ref 4.8–10.8)

## 2021-02-14 PROCEDURE — 97530 THERAPEUTIC ACTIVITIES: CPT

## 2021-02-14 PROCEDURE — 83605 ASSAY OF LACTIC ACID: CPT

## 2021-02-14 PROCEDURE — 85025 COMPLETE CBC W/AUTO DIFF WBC: CPT

## 2021-02-14 PROCEDURE — 36415 COLL VENOUS BLD VENIPUNCTURE: CPT

## 2021-02-14 PROCEDURE — 97162 PT EVAL MOD COMPLEX 30 MIN: CPT

## 2021-02-14 PROCEDURE — 6370000000 HC RX 637 (ALT 250 FOR IP): Performed by: INTERNAL MEDICINE

## 2021-02-14 PROCEDURE — 84145 PROCALCITONIN (PCT): CPT

## 2021-02-14 PROCEDURE — 1210000000 HC MED SURG R&B

## 2021-02-14 PROCEDURE — 80053 COMPREHEN METABOLIC PANEL: CPT

## 2021-02-14 PROCEDURE — 2580000003 HC RX 258: Performed by: INTERNAL MEDICINE

## 2021-02-14 PROCEDURE — 70450 CT HEAD/BRAIN W/O DYE: CPT

## 2021-02-14 PROCEDURE — 87040 BLOOD CULTURE FOR BACTERIA: CPT

## 2021-02-14 PROCEDURE — 6370000000 HC RX 637 (ALT 250 FOR IP): Performed by: ORTHOPAEDIC SURGERY

## 2021-02-14 PROCEDURE — 97116 GAIT TRAINING THERAPY: CPT

## 2021-02-14 PROCEDURE — 82607 VITAMIN B-12: CPT

## 2021-02-14 PROCEDURE — 6360000002 HC RX W HCPCS: Performed by: ORTHOPAEDIC SURGERY

## 2021-02-14 PROCEDURE — 6360000002 HC RX W HCPCS: Performed by: INTERNAL MEDICINE

## 2021-02-14 PROCEDURE — 82947 ASSAY GLUCOSE BLOOD QUANT: CPT

## 2021-02-14 RX ORDER — NICOTINE POLACRILEX 4 MG
15 LOZENGE BUCCAL PRN
Status: DISCONTINUED | OUTPATIENT
Start: 2021-02-14 | End: 2021-02-22 | Stop reason: HOSPADM

## 2021-02-14 RX ORDER — NICOTINE 21 MG/24HR
1 PATCH, TRANSDERMAL 24 HOURS TRANSDERMAL DAILY
Status: DISCONTINUED | OUTPATIENT
Start: 2021-02-14 | End: 2021-02-22 | Stop reason: HOSPADM

## 2021-02-14 RX ORDER — SODIUM CHLORIDE 9 MG/ML
INJECTION, SOLUTION INTRAVENOUS CONTINUOUS
Status: DISCONTINUED | OUTPATIENT
Start: 2021-02-14 | End: 2021-02-22 | Stop reason: HOSPADM

## 2021-02-14 RX ORDER — 0.9 % SODIUM CHLORIDE 0.9 %
1000 INTRAVENOUS SOLUTION INTRAVENOUS ONCE
Status: COMPLETED | OUTPATIENT
Start: 2021-02-14 | End: 2021-02-14

## 2021-02-14 RX ORDER — DEXTROSE MONOHYDRATE 25 G/50ML
12.5 INJECTION, SOLUTION INTRAVENOUS PRN
Status: DISCONTINUED | OUTPATIENT
Start: 2021-02-14 | End: 2021-02-22 | Stop reason: HOSPADM

## 2021-02-14 RX ORDER — DEXTROSE MONOHYDRATE 50 MG/ML
100 INJECTION, SOLUTION INTRAVENOUS PRN
Status: DISCONTINUED | OUTPATIENT
Start: 2021-02-14 | End: 2021-02-22 | Stop reason: HOSPADM

## 2021-02-14 RX ADMIN — HYDROCHLOROTHIAZIDE 25 MG: 25 TABLET ORAL at 08:05

## 2021-02-14 RX ADMIN — Medication 2000 MG: at 05:51

## 2021-02-14 RX ADMIN — OXYCODONE HYDROCHLORIDE AND ACETAMINOPHEN 1 TABLET: 5; 325 TABLET ORAL at 03:06

## 2021-02-14 RX ADMIN — DOCUSATE SODIUM AND SENNOSIDES 1 TABLET: 8.6; 5 TABLET, FILM COATED ORAL at 20:32

## 2021-02-14 RX ADMIN — ENOXAPARIN SODIUM 40 MG: 40 INJECTION SUBCUTANEOUS at 08:13

## 2021-02-14 RX ADMIN — LISINOPRIL 20 MG: 20 TABLET ORAL at 08:04

## 2021-02-14 RX ADMIN — OXYCODONE HYDROCHLORIDE AND ACETAMINOPHEN 1 TABLET: 5; 325 TABLET ORAL at 08:11

## 2021-02-14 RX ADMIN — SODIUM CHLORIDE, PRESERVATIVE FREE 2000 MG: 5 INJECTION INTRAVENOUS at 20:45

## 2021-02-14 RX ADMIN — SODIUM CHLORIDE 1000 ML: 9 INJECTION, SOLUTION INTRAVENOUS at 18:26

## 2021-02-14 RX ADMIN — DOCUSATE SODIUM AND SENNOSIDES 1 TABLET: 8.6; 5 TABLET, FILM COATED ORAL at 08:05

## 2021-02-14 RX ADMIN — SODIUM CHLORIDE: 9 INJECTION, SOLUTION INTRAVENOUS at 20:32

## 2021-02-14 RX ADMIN — SODIUM CHLORIDE, PRESERVATIVE FREE 10 ML: 5 INJECTION INTRAVENOUS at 08:13

## 2021-02-14 RX ADMIN — VANCOMYCIN HYDROCHLORIDE 1250 MG: 10 INJECTION, POWDER, LYOPHILIZED, FOR SOLUTION INTRAVENOUS at 20:41

## 2021-02-14 ASSESSMENT — PAIN DESCRIPTION - PAIN TYPE
TYPE: ACUTE PAIN;SURGICAL PAIN
TYPE: SURGICAL PAIN

## 2021-02-14 ASSESSMENT — PAIN DESCRIPTION - ORIENTATION: ORIENTATION: LEFT

## 2021-02-14 ASSESSMENT — PAIN SCALES - GENERAL
PAINLEVEL_OUTOF10: 0
PAINLEVEL_OUTOF10: 4
PAINLEVEL_OUTOF10: 4

## 2021-02-14 ASSESSMENT — PAIN DESCRIPTION - DESCRIPTORS
DESCRIPTORS: THROBBING
DESCRIPTORS: SORE;TIGHTNESS

## 2021-02-14 ASSESSMENT — PAIN DESCRIPTION - ONSET: ONSET: ON-GOING

## 2021-02-14 ASSESSMENT — PAIN DESCRIPTION - LOCATION
LOCATION: HIP
LOCATION: HIP

## 2021-02-14 NOTE — PROGRESS NOTES
% injection 10 mL  10 mL Intravenous 2 times per day Aleshia Benítez MD   10 mL at 02/14/21 0813    sodium chloride flush 0.9 % injection 10 mL  10 mL Intravenous PRN Aleshia Benítez MD        potassium chloride (KLOR-CON M) extended release tablet 40 mEq  40 mEq Oral PRN Aleshia Benítez MD        Or    potassium bicarb-citric acid (EFFER-K) effervescent tablet 40 mEq  40 mEq Oral PRN Aleshia Benítez MD        Or    potassium chloride 10 mEq/100 mL IVPB (Peripheral Line)  10 mEq Intravenous PRN Aleshia Benítez MD        promethazine (PHENERGAN) tablet 12.5 mg  12.5 mg Oral Q6H PRN Aleshia Benítez MD        Or    ondansetron [facility]) injection 4 mg  4 mg Intravenous Q6H PRN Aleshia Benítez MD        magnesium hydroxide (MILK OF MAGNESIA) 400 MG/5ML suspension 30 mL  30 mL Oral Daily PRN Aleshia Benítez MD        acetaminophen (TYLENOL) tablet 650 mg  650 mg Oral Q6H PRN Aleshia Benítez MD        Or    acetaminophen (TYLENOL) suppository 650 mg  650 mg Rectal Q6H PRN Aleshia Benítez MD        morphine injection 1 mg  1 mg Intravenous Q4H PRN Aleshia Benítez MD        sennosides-docusate sodium (SENOKOT-S) 8.6-50 MG tablet 1 tablet  1 tablet Oral BID Lindy Sweeney MD   1 tablet at 02/14/21 0805    oxyCODONE-acetaminophen (PERCOCET) 5-325 MG per tablet 1 tablet  1 tablet Oral Q4H PRN Lindy Sweeney MD   1 tablet at 02/14/21 0811    oxyCODONE-acetaminophen (PERCOCET) 5-325 MG per tablet 2 tablet  2 tablet Oral Q4H PRN Lindy Sweeney MD        enoxaparin (LOVENOX) injection 40 mg  40 mg Subcutaneous Daily Lindy Sweeney MD   40 mg at 02/14/21 0813    lisinopril (PRINIVIL;ZESTRIL) tablet 20 mg  20 mg Oral Daily Aleshia Benítez MD   20 mg at 02/14/21 0804    And    hydroCHLOROthiazide (HYDRODIURIL) tablet 25 mg  25 mg Oral Daily Aleshia Benítez MD   25 mg at 02/14/21 0805         dextrose        nicotine  1 patch Transdermal Daily    insulin lispro 0-6 Units Subcutaneous TID     insulin lispro  0-3 Units Subcutaneous Nightly    ondansetron  4 mg Intravenous Once    sodium chloride flush  10 mL Intravenous 2 times per day    sennosides-docusate sodium  1 tablet Oral BID    enoxaparin  40 mg Subcutaneous Daily    lisinopril  20 mg Oral Daily    And    hydroCHLOROthiazide  25 mg Oral Daily     glucose, dextrose, glucagon (rDNA), dextrose, sodium chloride flush, potassium chloride **OR** potassium alternative oral replacement **OR** potassium chloride, promethazine **OR** ondansetron, magnesium hydroxide, acetaminophen **OR** acetaminophen, morphine, oxyCODONE-acetaminophen, oxyCODONE-acetaminophen  DIET CARDIAC;       Labs:   CBC with DIFF:  Recent Labs     02/13/21  1144 02/14/21  0429   WBC 10.6 15.1*   RBC 4.40* 3.66*   HGB 13.9* 11.4*   HCT 43.5 36.4*   MCV 98.9* 99.5*   MCH 31.6* 31.1*   MCHC 32.0* 31.3*   RDW 12.2 12.2    170   MPV 10.2 10.9   NEUTOPHILPCT 79.4* 83.6*   LYMPHOPCT 13.0* 9.2*   MONOPCT 4.7 6.8   EOSRELPCT 1.9 0.0   BASOPCT 0.4 0.1   NEUTROABS 8.4* 12.6*   LYMPHSABS 1.4 1.4   MONOSABS 0.50 1.00*   EOSABS 0.20 0.00   BASOSABS 0.00 0.00       CMP/BMP:  Recent Labs     02/13/21  1144 02/14/21  0429    134*   K 3.8 4.9    98   CO2 30* 27   ANIONGAP 4* 9   GLUCOSE 128* 161*   BUN 19 24*   CREATININE 0.8 1.0   LABGLOM >60 >60   CALCIUM 8.9 8.6*   PROT 7.3  --    LABALBU 3.8  --    BILITOT 0.3  --    ALKPHOS 58  --    ALT 7  --    AST 13  --          CRP:  No results for input(s): CRP in the last 72 hours. Sed Rate:  No results for input(s): SEDRATE in the last 72 hours. HgBA1c:  No components found for: HGBA1C  FLP:  No results found for: TRIG, HDL, LDLCALC, LDLDIRECT, LABVLDL  TSH:  No results found for: TSH  Troponin T: No results for input(s): TROPONINI in the last 72 hours. Pro-BNP: No results for input(s): BNP in the last 72 hours.   INR:   Recent Labs     02/13/21  1144   INR 1.07     ABGs: No results found for: PHART, PO2ART, BMA8XYJ  UA:No results for input(s): NITRITE, COLORU, PHUR, LABCAST, WBCUA, RBCUA, MUCUS, TRICHOMONAS, YEAST, BACTERIA, CLARITYU, SPECGRAV, LEUKOCYTESUR, UROBILINOGEN, BILIRUBINUR, BLOODU, GLUCOSEU, AMORPHOUS in the last 72 hours. Invalid input(s): Rafita Inels      Culture Results:    No results for input(s): CXSURG in the last 720 hours. Blood Culture Recent: No results for input(s): BC in the last 720 hours. Cultures:   No results for input(s): CULTURE in the last 72 hours. No results for input(s): BC, Diogo Craw in the last 72 hours. No results for input(s): CXSURG in the last 72 hours. No results for input(s): MG, PHOS in the last 72 hours. Recent Labs     02/13/21  1144   AST 13   ALT 7   BILITOT 0.3   ALKPHOS 58         RAD:   Xr Femur Left (min 2 Views)    Result Date: 2/13/2021  EXAMINATION: XR FEMUR LEFT (MIN 2 VIEWS) 2/13/2021 3:21 PM HISTORY: Left hip fracture, open reduction internal fixation. Fluoroscopy time: 1 minute 3 seconds. Radiation dose: 0.2846 mGym2 Number fluoroscopic images acquired: 4. Report: 4 separate intraoperative fluoroscopic spot views were obtained under the supervision of the orthopedic surgery service, during open reduction internal fixation of the intratrochanteric left hip fracture, with satisfactory hardware positioning and alignment. Images are stored in PACS for review. Signed by Dr Luciana Beckham on 2/13/2021 3:22 PM    Xr Femur Left (min 2 Views)    Result Date: 2/13/2021  EXAMINATION: XR FEMUR LEFT (MIN 2 VIEWS) 2/13/2021 1:38 PM HISTORY: Fall, acute left hip fracture. Report: 3 views of the left femur were obtained. COMPARISON: X-rays of the pelvis left hip from the same day. There is acute vertically oriented closed fracture within the intertrochanteric aspect of the left hip, with mild lateral displacement and no involvement of the femoral head. The mid shaft and distal femur are unremarkable.  There is soft tissue swelling in the region of the fracture. Acute closed vertically oriented intertrochanteric left hip fracture with mild lateral displacement. The mid shaft and distal left femur are unremarkable. Signed by Dr Madhuri Beckham on 2/13/2021 1:39 PM    Xr Chest Portable    Result Date: 2/13/2021  EXAMINATION: XR CHEST PORTABLE 2/13/2021 12:16 PM HISTORY: Fall, pain. Report: A portable frontal view of the chest was obtained. COMPARISON: There are no correlative imaging studies for comparison. The lungs are clear, well expanded. Heart size is normal. There is mild ectasia of the thoracic aorta. No pneumothorax or significant pleural effusion is identified. The osseous structures and upper abdomen are unremarkable. No acute cardiopulmonary abnormality. Signed by Dr Madhuri Beckham on 2/13/2021 12:18 PM    Xr Hip 2-3 Vw W Pelvis Left    Result Date: 2/13/2021  EXAMINATION: XR HIP 2-3 VW W PELVIS LEFT 2/13/2021 12:14 PM HISTORY: Fall, left hip pain. Report: An AP view the pelvis was obtained as well as AP and crosstable lateral views of the left hip. COMPARISON: There are no correlative imaging studies for comparison. The right hip is unremarkable. There is an acute  intratrochanteric left hip fracture oriented vertically, with mild lateral displacement, no significant angulation or involvement of the femoral head. The other pelvic osseous structures appear unremarkable. Acute mildly displaced closed intratrochanteric left hip fracture as described. Signed by Dr Madhuri Beckham on 2/13/2021 12:16 PM        Objective:   Vitals:   /76   Pulse 68   Temp 98.3 °F (36.8 °C) (Temporal)   Resp 18   SpO2 94%       Patient Vitals for the past 24 hrs:   BP Temp Temp src Pulse Resp SpO2   02/14/21 1046 130/76 -- -- 68 -- --   02/14/21 0703 -- -- Temporal 92 18 94 %   02/14/21 0215 122/77 98.3 °F (36.8 °C) Temporal 95 16 94 %   02/13/21 2305 128/84 98 °F (36.7 °C) Temporal 98 14 96 %   02/13/21 2106 125/86 97.6 °F (36.4 °C) Temporal 110 16 95 %   02/13/21 1908 (!) 134/92 97.9 °F (36.6 °C) Temporal 110 16 94 %   02/13/21 1811 (!) 148/84 98 °F (36.7 °C) Temporal 105 18 96 %   02/13/21 1706 125/79 97.2 °F (36.2 °C) -- 90 16 --   02/13/21 1620 138/78 96.9 °F (36.1 °C) -- 82 16 94 %   02/13/21 1549 133/81 96.9 °F (36.1 °C) Skin 81 16 96 %   02/13/21 1540 130/82 -- -- 81 17 94 %   02/13/21 1535 133/75 97.5 °F (36.4 °C) Temporal 81 17 94 %   02/13/21 1530 117/75 -- -- 82 16 94 %   02/13/21 1525 133/75 -- -- 80 18 95 %   02/13/21 1520 131/78 -- -- 81 18 95 %   02/13/21 1515 127/76 -- -- 80 19 94 %   02/13/21 1510 (!) 142/75 -- -- 83 18 94 %   02/13/21 1507 134/75 97.1 °F (36.2 °C) Temporal 79 17 96 %   02/13/21 1236 (!) 179/101 -- -- 73 24 95 %   02/13/21 1221 (!) 180/99 -- -- 74 19 95 %   02/13/21 1203 117/81 -- -- 77 22 96 %       24HR INTAKE/OUTPUT:      Intake/Output Summary (Last 24 hours) at 2/14/2021 1054  Last data filed at 2/14/2021 5762  Gross per 24 hour   Intake 2584 ml   Output 400 ml   Net 2184 ml       Physical Exam  Vitals signs and nursing note reviewed. Constitutional:       General: He is not in acute distress. Appearance: Normal appearance. He is not ill-appearing, toxic-appearing or diaphoretic. HENT:      Head: Normocephalic and atraumatic. Right Ear: External ear normal.      Left Ear: External ear normal.      Nose: Nose normal. No congestion or rhinorrhea. Mouth/Throat:      Mouth: Mucous membranes are moist.      Pharynx: Oropharynx is clear. No oropharyngeal exudate or posterior oropharyngeal erythema. Eyes:      General: No scleral icterus. Right eye: No discharge. Left eye: No discharge. Extraocular Movements: Extraocular movements intact. Conjunctiva/sclera: Conjunctivae normal.   Neck:      Musculoskeletal: Normal range of motion and neck supple. No neck rigidity or muscular tenderness. Vascular: No carotid bruit.    Cardiovascular:      Rate and Rhythm: Normal ice  Resolved Problems:    * No resolved hospital problems. *      Brief Summary  Mr. Litzy Dunn, a 67 y.o. male with a history of HTN, presenting to [facility] ED (02/13/2021) on account an acute onset of moderate to severe*left hip pain, after reported mechanical fall.     Patient reportedly slipped and fell onto the ice, landing on his left hip. Patient reportedly unable to ambulate after the fall due to pain.      Nno other associated symptoms reported. No dizziness, lightheadedness or palpitation reported.     X-ray reported an acute mildly displaced closed intratrochanteric left hip  Fracture     Orthopedic surgery consulted from ED  Patient admitted for further work-up and management. Acute mildly displaced closed intratrochanteric left hip  Fracture  · Status post mechanical fall from slipping on ice  · Orthopedics following  · Status post cephalomedullary nailing of left intratrochanteric hip fracture. (02/13/2021)  · Continue symptomatic and supportive management including pain management as needed. · PT OT     History of hypertension  · Continue home regimen  · Lisinopril-HCTZ 20-25 mg p.o. daily     Hyperglycemia  · No documented history of diabetes  · Hemoglobin A1c level: 5.6%  · However patient with persistent hyperglycemia  · Glucose of 161 (02/1/2021)  · Insulin as part on low-dose sliding scale  · Continue monitoring POC glucose           Continue management of other chronic medical conditions - see above and orders. Advance Directive: Full Code    DIET CARDIAC;         Consults Made:   IP CONSULT TO ORTHOPEDIC SURGERY  IP CONSULT TO SOCIAL WORK    DVT prophylaxis: Enoxaparin      Discharge planning: tbd    Time Spent is 25 mins in the examination, evaluation, counseling and review of medications, assessment and plan.      Electronically signed by   Everett Claudio MD, MPH,   Internal Medicine Hospitalist   2/14/2021 10:54 AM

## 2021-02-14 NOTE — PLAN OF CARE
Problem: Falls - Risk of:  Goal: Will remain free from falls  Description: Will remain free from falls  2/14/2021 0409 by Jose Villagomez LPN  Outcome: Ongoing  2/13/2021 1807 by Vignesh Blake RN  Outcome: Ongoing  Goal: Absence of physical injury  Description: Absence of physical injury  2/14/2021 0409 by Jose Villagomez LPN  Outcome: Ongoing  2/13/2021 1807 by Vignesh Blake RN  Outcome: Ongoing     Problem: Pain:  Goal: Pain level will decrease  Description: Pain level will decrease  2/14/2021 0409 by Jose Villagomez LPN  Outcome: Ongoing  2/13/2021 1807 by Vignesh Blake RN  Outcome: Ongoing  Goal: Control of acute pain  Description: Control of acute pain  2/14/2021 0409 by Jose Villagomez LPN  Outcome: Ongoing  2/13/2021 1807 by Vignesh Blake RN  Outcome: Ongoing  Goal: Control of chronic pain  Description: Control of chronic pain  2/14/2021 0409 by Jose Villagomez LPN  Outcome: Ongoing  2/13/2021 1807 by Vignesh Blake RN  Outcome: Ongoing     Problem: Discharge Planning:  Goal: Discharged to appropriate level of care  Description: Discharged to appropriate level of care  Outcome: Ongoing     Problem: Infection - Surgical Site:  Goal: Will show no infection signs and symptoms  Description: Will show no infection signs and symptoms  Outcome: Ongoing     Problem: Injury - Risk of, Postfracture Complications:  Goal: Absence of fat embolism  Description: Absence of fat embolism  Outcome: Ongoing  Goal: Absence of compartment syndrome signs and symptoms  Description: Absence of compartment syndrome signs and symptoms  Outcome: Ongoing     Problem: Mobility - Impaired:  Goal: Mobility will improve to maximum level  Description: Mobility will improve to maximum level  Outcome: Ongoing     Problem: Pain - Acute:  Goal: Pain level will decrease  Description: Pain level will decrease  2/14/2021 0409 by Jose Villagomez LPN  Outcome: Ongoing  2/13/2021 1807 by Vignesh Blake RN  Outcome:

## 2021-02-14 NOTE — PROGRESS NOTES
Orthopedic Surgery Progress Note    José Mishra  2/14/2021      Subjective:     Reports no acute events overnight. States left leg/hip is \"sore\", but states that pain is improved from preoperative status. Denies active chest pain or shortness of breath. States that he wants to get out of the hospital to smoke a cigarette. Objective:     Patient Vitals for the past 24 hrs:   BP Temp Temp src Pulse Resp SpO2   02/14/21 0703 -- -- Temporal 92 18 94 %   02/14/21 0215 122/77 98.3 °F (36.8 °C) Temporal 95 16 94 %   02/13/21 2305 128/84 98 °F (36.7 °C) Temporal 98 14 96 %   02/13/21 2106 125/86 97.6 °F (36.4 °C) Temporal 110 16 95 %   02/13/21 1908 (!) 134/92 97.9 °F (36.6 °C) Temporal 110 16 94 %   02/13/21 1811 (!) 148/84 98 °F (36.7 °C) Temporal 105 18 96 %   02/13/21 1706 125/79 97.2 °F (36.2 °C) -- 90 16 --   02/13/21 1620 138/78 96.9 °F (36.1 °C) -- 82 16 94 %   02/13/21 1549 133/81 96.9 °F (36.1 °C) Skin 81 16 96 %   02/13/21 1540 130/82 -- -- 81 17 94 %   02/13/21 1535 133/75 97.5 °F (36.4 °C) Temporal 81 17 94 %   02/13/21 1530 117/75 -- -- 82 16 94 %   02/13/21 1525 133/75 -- -- 80 18 95 %   02/13/21 1520 131/78 -- -- 81 18 95 %   02/13/21 1515 127/76 -- -- 80 19 94 %   02/13/21 1510 (!) 142/75 -- -- 83 18 94 %   02/13/21 1507 134/75 97.1 °F (36.2 °C) Temporal 79 17 96 %   02/13/21 1236 (!) 179/101 -- -- 73 24 95 %   02/13/21 1221 (!) 180/99 -- -- 74 19 95 %   02/13/21 1203 117/81 -- -- 77 22 96 %       General: Awake, alert, no acute distress  Respiratory: Nonlabored respirations  Cardiovascular: Regular rate  Left lower extremity: Dressings are in place, clean, dry and intact. No significant edema, erythema or ecchymosis. EHL, FHL, tibialis anterior, gastrocsoleus complex motor intact. Gross sensation is intact to the left foot. Left foot is warm and perfused.         Data Review:  Recent Labs     02/13/21  1144 02/14/21 0429   HGB 13.9* 11.4*     Recent Labs     02/14/21 0429   *   K 4.9 CREATININE 1.0       Assessment:     66 y/o M POD#1 s/p L CMN, significant medical history for hypertension and chronic tobacco use    Plan:     Pain: Controlled, continue current regimen, VSS, asymptomatic, no plans for transfusion  Acute post-op blood loss anemia: H/H 11.4/36.4  Leukocytosis: Reactive (?), no signs of infection  DVT prophylaxis: PAS boots, lovenox in house, plans to transition to 325mg ASA as outpatient for one month  Physical therapy/Occupational therapy  Activity: Weight bearing as tolerated LLE, ice/elevate  Dressing: Plan to keep in place for 2 weeks unless they lose adhesion or become saturated, then apply new Mepilex dressings  Disposition: Await PT/OT rec's, discharge planning, okay for discharge from orthopedic standpoint when deemed stable, follow-up 2 weeks in clinic.       Electronically signed by Tree Abbasi MD on 2/14/2021 at 7:30 AM

## 2021-02-14 NOTE — PLAN OF CARE
Problem: Falls - Risk of:  Goal: Will remain free from falls  Description: Will remain free from falls  2/14/2021 0427 by Morenita Rangel LPN  Outcome: Ongoing  2/14/2021 0409 by Morenita Rangel LPN  Outcome: Ongoing  2/13/2021 1807 by Tamie Leon RN  Outcome: Ongoing  Goal: Absence of physical injury  Description: Absence of physical injury  2/14/2021 0427 by Morenita Rangel LPN  Outcome: Ongoing  2/14/2021 0409 by Morenita Rangel LPN  Outcome: Ongoing  2/13/2021 1807 by Tamie Leon RN  Outcome: Ongoing     Problem: Pain:  Goal: Pain level will decrease  Description: Pain level will decrease  2/14/2021 0427 by Morenita Rangel LPN  Outcome: Ongoing  2/14/2021 0409 by Morenita Rangel LPN  Outcome: Ongoing  2/13/2021 1807 by Tamie Leon RN  Outcome: Ongoing  Goal: Control of acute pain  Description: Control of acute pain  2/14/2021 0427 by Morenita Rangel LPN  Outcome: Ongoing  2/14/2021 0409 by Morenita Rangel LPN  Outcome: Ongoing  2/13/2021 1807 by Tamie Leon RN  Outcome: Ongoing  Goal: Control of chronic pain  Description: Control of chronic pain  2/14/2021 0427 by Moreinta Rangel LPN  Outcome: Ongoing  2/14/2021 0409 by Morenita Rangel LPN  Outcome: Ongoing  2/13/2021 1807 by Tamie Leon RN  Outcome: Ongoing     Problem: Discharge Planning:  Goal: Discharged to appropriate level of care  Description: Discharged to appropriate level of care  2/14/2021 0427 by Morenita Rangel LPN  Outcome: Ongoing  2/14/2021 0409 by Morenita Rangel LPN  Outcome: Ongoing     Problem: Infection - Surgical Site:  Goal: Will show no infection signs and symptoms  Description: Will show no infection signs and symptoms  2/14/2021 0427 by Morenita Rangel LPN  Outcome: Ongoing  2/14/2021 0409 by Morenita Rangel LPN  Outcome: Ongoing     Problem: Injury - Risk of, Postfracture Complications:  Goal: Absence of fat embolism  Description: Absence of fat embolism  2/14/2021 0427 by Fabio Obrien LPN  Outcome: Ongoing  2/14/2021 0409 by Fabio Obrien LPN  Outcome: Ongoing  Goal: Absence of compartment syndrome signs and symptoms  Description: Absence of compartment syndrome signs and symptoms  2/14/2021 0427 by Fabio Obrien LPN  Outcome: Ongoing  2/14/2021 0409 by Fabio Obrien LPN  Outcome: Ongoing     Problem: Mobility - Impaired:  Goal: Mobility will improve to maximum level  Description: Mobility will improve to maximum level  2/14/2021 0427 by Fabio Obrien LPN  Outcome: Ongoing  2/14/2021 0409 by Fabio Obrien LPN  Outcome: Ongoing     Problem: Pain - Acute:  Goal: Pain level will decrease  Description: Pain level will decrease  2/14/2021 0427 by Fabio Obrien LPN  Outcome: Ongoing  2/14/2021 0409 by Fabio Obrien LPN  Outcome: Ongoing  2/13/2021 1807 by Trevor Steele RN  Outcome: Ongoing     Problem: Venous Thromboembolism:  Goal: Absence of deep vein thrombosis  Description: Absence of deep vein thrombosis  2/14/2021 0427 by Fabio Obrien LPN  Outcome: Ongoing  2/14/2021 0409 by Fabio Obrien LPN  Outcome: Ongoing  Goal: Absence of signs or symptoms of impaired coagulation  Description: Absence of signs or symptoms of impaired coagulation  2/14/2021 0427 by Fabio Obrien LPN  Outcome: Ongoing  2/14/2021 0409 by Fabio Obrien LPN  Outcome: Ongoing  Goal: Will show no signs or symptoms of venous thromboembolism  Description: Will show no signs or symptoms of venous thromboembolism  2/14/2021 0427 by Fabio Obrien LPN  Outcome: Ongoing  2/14/2021 0409 by Fabio Obrien LPN  Outcome: Ongoing     Problem:  Activity:  Goal: Ability to ambulate will improve  Description: Ability to ambulate will improve  Outcome: Ongoing  Goal: Ability to perform activities at highest level will improve  Description: Ability to perform activities at highest level will improve  Outcome: Ongoing     Problem: Health Behavior:  Goal: Breana Ramires RN  Outcome: Ongoing     Problem: Skin Integrity:  Goal: Demonstration of wound healing without infection will improve  Description: Demonstration of wound healing without infection will improve  Outcome: Ongoing  Goal: Risk for impaired skin integrity will decrease  Description: Risk for impaired skin integrity will decrease  Outcome: Ongoing     Problem: Tissue Perfusion:  Goal: Peripheral tissue perfusion will improve  Description: Peripheral tissue perfusion will improve  Outcome: Ongoing  Goal: Risk of venous thrombosis will decrease  Description: Risk of venous thrombosis will decrease  Outcome: Ongoing     Problem: Urinary Elimination:  Goal: Ability to reestablish a normal urinary elimination pattern will improve - after catheter removal  Description: Ability to reestablish a normal urinary elimination pattern will improve  Outcome: Ongoing

## 2021-02-14 NOTE — PROGRESS NOTES
12 hour chart check review completed. Electronically signed by Tiffani Canada LPN on 9/08/0214 at 11:04 AM

## 2021-02-14 NOTE — PROGRESS NOTES
Physical Therapy    Facility/Department: [facility] SURG SERVICES  Initial Assessment    NAME: Alla Arzola  : 1945  MRN: [id number]    Date of Service: 2021    Discharge Recommendations:  Continue to assess pending progress, Patient would benefit from continued therapy after discharge, 24 hour supervision or assist, Home with Home health PT        Assessment   Body structures, Functions, Activity limitations: Decreased functional mobility ; Decreased ROM; Decreased strength;Decreased safe awareness;Decreased cognition;Decreased endurance;Decreased posture; Increased pain;Decreased balance;Decreased coordination  Assessment: Pt. will benefit from cont. PT in acute care and will need additional therapy once d/c from [facility] [place]. Pt. is a fall risk and should not attempt mobility on his own. Pt. is impulsive and demanding with PT. Wants to have walker handed to him and just get up and walk. Pt. does not listen to instruction given by PT and then argues that his way is better. Pt. limited by nausea, lightheadedness and impulsivity on eval. Pt. not currently safe to d/c home due to inability to amb. well and had much difficulty even stepping to chair. Pt. will need a RW upon d/c and would benefit from HHPT if he is able to advance with therapy and be d/c home. Treatment Diagnosis: impaired gait and mobility  Prognosis: Fair  Decision Making: Medium Complexity  PT Education: Goals;PT Role;Plan of Care;General Safety;Gait Training;Weight-bearing Education; Functional Mobility Training; Adaptive Device Training;Transfer Training  Patient Education: pt needed additional instruction that it cannot be expected to just get up and walk; needs to take his time and not hurry; needing to take some pain meds to ease pain so he can better participate in therapy. Barriers to Learning: impulsivity  REQUIRES PT FOLLOW UP: Yes  Activity Tolerance  Activity Tolerance: Patient limited by cognitive status; Other;Patient limited by in Pain: Yes  Pre Treatment Pain Screening  Intervention List: Patient able to continue with treatment  Comments / Details: RN notified pt feeling increased pain, light headed and nausea with mobility    Orientation  Orientation  Overall Orientation Status: Within Functional Limits  Social/Functional History  Social/Functional History  Lives With: Spouse  Type of Home: House  Home Layout: One level  Home Access: Stairs to enter without rails  ADL Assistance: Independent  Ambulation Assistance: Independent  Transfer Assistance: Independent  Occupation: Self employed  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  Cognition   Cognition  Overall Cognitive Status: Exceptions  Following Commands: Follows one step commands with increased time; Follows one step commands with repetition  Memory: Appears intact  Safety Judgement: Decreased awareness of need for assistance;Decreased awareness of need for safety  Problem Solving: Assistance required to generate solutions;Assistance required to implement solutions  Insights: Decreased awareness of deficits  Initiation: Requires cues for all  Sequencing: Requires cues for all  Cognition Comment: pt impulsive and demanding, thinks he will be able to just get up and walk. Objective     Observation/Palpation  Posture: Poor  Observation: posterior and R sided lean    AROM RLE (degrees)  RLE AROM: WNL  PROM LLE (degrees)  LLE PROM: Exceptions  LLE General PROM: AAROM knee and hip to 90 deg, ankle AROM to neutral DF  Strength RLE  Strength RLE: WNL  Comment: 5/5  Strength LLE  Strength LLE: Exception  Comment: ankle 3-/5, knee and hip 2-/5  Strength RUE  Strength RUE: WNL  Strength LUE  Strength LUE: WNL        Bed mobility  Supine to Sit: Moderate assistance  Scooting: Moderate assistance  Comment: pt sat on EOB x 10 mins due to feeling dizzy, but impulsive and wanting to stand and walk  Transfers  Sit to Stand:  Moderate Assistance  Stand to sit: Moderate Assistance  Comment: pt needing v. cues to push up from bed, arguing with therapist that it won't work. Pt. stood once, attempting to step to [facility], but with heavy posterior lean, pt sat on EOB due to asking for a drink of water. PCA assisted with getting pt to chair Mod A x 2 and RW. Limited ability to WB on LLE. Ambulation  Ambulation?: Yes  WB Status: FWBAT LLE  Ambulation 1  Surface: level tile  Device: Rolling Walker  Assistance: Moderate assistance;2 Person assistance  Quality of Gait: antalgic  Gait Deviations: Slow Kayleen;Decreased step length;Decreased step height  Distance: 1' to [facility], 2' to chair  Comments: pt needing v. cues to keep RW closer to him while standing and attempting amb. Balance  Posture: Poor  Sitting - Static: Fair;-  Sitting - Dynamic: Poor;+  Standing - Static: Poor  Standing - Dynamic: Poor;-  Exercises  Comments: ASHOM LLE LAQ and marching x 10 reps, AP x 10 AROM     Plan   Plan  Times per week: 3-7  Times per day: Twice a day  Plan weeks: 2  Current Treatment Recommendations: Strengthening, ROM, Balance Training, Functional Mobility Training, Transfer Training, Gait Training, Safety Education & Training, Positioning, Equipment Evaluation, Education, & procurement, Patient/Caregiver Education & Training  Plan Comment: cont. PT per POC.   Safety Devices  Type of devices: Gait belt, Patient at risk for falls, Nurse notified, Left in bed, Call light within reach, Chair alarm in place, Left in chair(reclined with needs in reach)    G-Code       OutComes Score                                                  AM-PAC Score             Goals  Short term goals  Time Frame for Short term goals: 2 wks  Short term goal 1: supine to sit indep  Short term goal 2: sit to stand indep  Short term goal 3: amb. 100' with RW SBA  Short term goal 4: up/down 3 stairs SBA  Patient Goals   Patient goals : go home today       Therapy Time   Individual Concurrent Group Co-treatment   Time In           Time Out           Minutes Oleg Carey, PT    Electronically signed by Oleg Carey PT on 2/14/2021 at 10:47 AM

## 2021-02-15 LAB
ANION GAP SERPL CALCULATED.3IONS-SCNC: 7 MMOL/L (ref 7–19)
BASOPHILS ABSOLUTE: 0 K/UL (ref 0–0.2)
BASOPHILS RELATIVE PERCENT: 0.2 % (ref 0–1)
BILIRUBIN URINE: NEGATIVE
BLOOD, URINE: NEGATIVE
BUN BLDV-MCNC: 29 MG/DL (ref 8–23)
CALCIUM SERPL-MCNC: 8.1 MG/DL (ref 8.8–10.2)
CHLORIDE BLD-SCNC: 102 MMOL/L (ref 98–111)
CLARITY: CLEAR
CO2: 27 MMOL/L (ref 22–29)
COLOR: YELLOW
CREAT SERPL-MCNC: 1 MG/DL (ref 0.5–1.2)
EOSINOPHILS ABSOLUTE: 0 K/UL (ref 0–0.6)
EOSINOPHILS RELATIVE PERCENT: 0.1 % (ref 0–5)
GFR AFRICAN AMERICAN: >59
GFR NON-AFRICAN AMERICAN: >60
GLUCOSE BLD-MCNC: 112 MG/DL (ref 74–109)
GLUCOSE BLD-MCNC: 115 MG/DL (ref 70–99)
GLUCOSE BLD-MCNC: 125 MG/DL (ref 70–99)
GLUCOSE BLD-MCNC: 89 MG/DL (ref 70–99)
GLUCOSE BLD-MCNC: 95 MG/DL (ref 70–99)
GLUCOSE URINE: NEGATIVE MG/DL
HCT VFR BLD CALC: 29.6 % (ref 42–52)
HEMOGLOBIN: 9.4 G/DL (ref 14–18)
IMMATURE GRANULOCYTES #: 0.1 K/UL
KETONES, URINE: NEGATIVE MG/DL
LEUKOCYTE ESTERASE, URINE: NEGATIVE
LYMPHOCYTES ABSOLUTE: 1.8 K/UL (ref 1.1–4.5)
LYMPHOCYTES RELATIVE PERCENT: 15.7 % (ref 20–40)
MCH RBC QN AUTO: 31.5 PG (ref 27–31)
MCHC RBC AUTO-ENTMCNC: 31.8 G/DL (ref 33–37)
MCV RBC AUTO: 99.3 FL (ref 80–94)
MONOCYTES ABSOLUTE: 0.8 K/UL (ref 0–0.9)
MONOCYTES RELATIVE PERCENT: 7 % (ref 0–10)
NEUTROPHILS ABSOLUTE: 8.9 K/UL (ref 1.5–7.5)
NEUTROPHILS RELATIVE PERCENT: 76.5 % (ref 50–65)
NITRITE, URINE: NEGATIVE
PDW BLD-RTO: 12.4 % (ref 11.5–14.5)
PERFORMED ON: ABNORMAL
PERFORMED ON: ABNORMAL
PERFORMED ON: NORMAL
PERFORMED ON: NORMAL
PH UA: 5 (ref 5–8)
PLATELET # BLD: 129 K/UL (ref 130–400)
PMV BLD AUTO: 11.4 FL (ref 9.4–12.4)
POTASSIUM REFLEX MAGNESIUM: 4.1 MMOL/L (ref 3.5–5)
PROCALCITONIN: 0.19 NG/ML (ref 0–0.09)
PROTEIN UA: NEGATIVE MG/DL
RBC # BLD: 2.98 M/UL (ref 4.7–6.1)
SODIUM BLD-SCNC: 136 MMOL/L (ref 136–145)
SPECIFIC GRAVITY UA: 1.02 (ref 1–1.03)
UROBILINOGEN, URINE: 0.2 E.U./DL
WBC # BLD: 11.6 K/UL (ref 4.8–10.8)

## 2021-02-15 PROCEDURE — 97166 OT EVAL MOD COMPLEX 45 MIN: CPT

## 2021-02-15 PROCEDURE — 97535 SELF CARE MNGMENT TRAINING: CPT

## 2021-02-15 PROCEDURE — 87081 CULTURE SCREEN ONLY: CPT

## 2021-02-15 PROCEDURE — 97530 THERAPEUTIC ACTIVITIES: CPT

## 2021-02-15 PROCEDURE — 6360000002 HC RX W HCPCS: Performed by: INTERNAL MEDICINE

## 2021-02-15 PROCEDURE — 82947 ASSAY GLUCOSE BLOOD QUANT: CPT

## 2021-02-15 PROCEDURE — 6370000000 HC RX 637 (ALT 250 FOR IP): Performed by: INTERNAL MEDICINE

## 2021-02-15 PROCEDURE — 87086 URINE CULTURE/COLONY COUNT: CPT

## 2021-02-15 PROCEDURE — 85025 COMPLETE CBC W/AUTO DIFF WBC: CPT

## 2021-02-15 PROCEDURE — 6370000000 HC RX 637 (ALT 250 FOR IP): Performed by: ORTHOPAEDIC SURGERY

## 2021-02-15 PROCEDURE — 80048 BASIC METABOLIC PNL TOTAL CA: CPT

## 2021-02-15 PROCEDURE — 36415 COLL VENOUS BLD VENIPUNCTURE: CPT

## 2021-02-15 PROCEDURE — 2580000003 HC RX 258: Performed by: INTERNAL MEDICINE

## 2021-02-15 PROCEDURE — 6360000002 HC RX W HCPCS: Performed by: ORTHOPAEDIC SURGERY

## 2021-02-15 PROCEDURE — 84145 PROCALCITONIN (PCT): CPT

## 2021-02-15 PROCEDURE — 81003 URINALYSIS AUTO W/O SCOPE: CPT

## 2021-02-15 PROCEDURE — 1210000000 HC MED SURG R&B

## 2021-02-15 RX ORDER — OXYCODONE HYDROCHLORIDE AND ACETAMINOPHEN 5; 325 MG/1; MG/1
1 TABLET ORAL EVERY 6 HOURS PRN
Status: DISCONTINUED | OUTPATIENT
Start: 2021-02-15 | End: 2021-02-22 | Stop reason: HOSPADM

## 2021-02-15 RX ADMIN — LISINOPRIL 20 MG: 20 TABLET ORAL at 09:17

## 2021-02-15 RX ADMIN — PROMETHAZINE HYDROCHLORIDE 12.5 MG: 12.5 TABLET ORAL at 21:16

## 2021-02-15 RX ADMIN — SODIUM CHLORIDE, PRESERVATIVE FREE 10 ML: 5 INJECTION INTRAVENOUS at 09:18

## 2021-02-15 RX ADMIN — SODIUM CHLORIDE, PRESERVATIVE FREE 2000 MG: 5 INJECTION INTRAVENOUS at 06:28

## 2021-02-15 RX ADMIN — ENOXAPARIN SODIUM 40 MG: 40 INJECTION SUBCUTANEOUS at 09:17

## 2021-02-15 RX ADMIN — SODIUM CHLORIDE: 9 INJECTION, SOLUTION INTRAVENOUS at 14:14

## 2021-02-15 RX ADMIN — OXYCODONE HYDROCHLORIDE AND ACETAMINOPHEN 2 TABLET: 5; 325 TABLET ORAL at 01:30

## 2021-02-15 RX ADMIN — HYDROCHLOROTHIAZIDE 25 MG: 25 TABLET ORAL at 09:17

## 2021-02-15 RX ADMIN — SODIUM CHLORIDE, PRESERVATIVE FREE 2000 MG: 5 INJECTION INTRAVENOUS at 18:41

## 2021-02-15 RX ADMIN — DOCUSATE SODIUM AND SENNOSIDES 1 TABLET: 8.6; 5 TABLET, FILM COATED ORAL at 21:16

## 2021-02-15 RX ADMIN — DOCUSATE SODIUM AND SENNOSIDES 1 TABLET: 8.6; 5 TABLET, FILM COATED ORAL at 09:17

## 2021-02-15 RX ADMIN — OXYCODONE AND ACETAMINOPHEN 1 TABLET: 5; 325 TABLET ORAL at 18:50

## 2021-02-15 RX ADMIN — VANCOMYCIN HYDROCHLORIDE 1000 MG: 10 INJECTION, POWDER, LYOPHILIZED, FOR SOLUTION INTRAVENOUS at 16:43

## 2021-02-15 ASSESSMENT — PAIN SCALES - GENERAL
PAINLEVEL_OUTOF10: 10
PAINLEVEL_OUTOF10: 10
PAINLEVEL_OUTOF10: 0
PAINLEVEL_OUTOF10: 8

## 2021-02-15 ASSESSMENT — PAIN DESCRIPTION - LOCATION: LOCATION: HIP

## 2021-02-15 ASSESSMENT — PAIN DESCRIPTION - DESCRIPTORS: DESCRIPTORS: DISCOMFORT;TENDER

## 2021-02-15 ASSESSMENT — PAIN - FUNCTIONAL ASSESSMENT: PAIN_FUNCTIONAL_ASSESSMENT: PREVENTS OR INTERFERES WITH ALL ACTIVE AND SOME PASSIVE ACTIVITIES

## 2021-02-15 ASSESSMENT — PAIN DESCRIPTION - PAIN TYPE: TYPE: ACUTE PAIN;SURGICAL PAIN

## 2021-02-15 NOTE — PLAN OF CARE
Problem: Falls - Risk of:  Goal: Will remain free from falls  Description: Will remain free from falls  Outcome: Ongoing  Goal: Absence of physical injury  Description: Absence of physical injury  Outcome: Ongoing     Problem: Pain:  Goal: Pain level will decrease  Description: Pain level will decrease  Outcome: Ongoing  Goal: Control of acute pain  Description: Control of acute pain  Outcome: Ongoing  Goal: Control of chronic pain  Description: Control of chronic pain  Outcome: Ongoing     Problem: Discharge Planning:  Goal: Discharged to appropriate level of care  Description: Discharged to appropriate level of care  Outcome: Ongoing     Problem: Infection - Surgical Site:  Goal: Will show no infection signs and symptoms  Description: Will show no infection signs and symptoms  Outcome: Ongoing     Problem: Injury - Risk of, Postfracture Complications:  Goal: Absence of fat embolism  Description: Absence of fat embolism  Outcome: Ongoing  Goal: Absence of compartment syndrome signs and symptoms  Description: Absence of compartment syndrome signs and symptoms  Outcome: Ongoing     Problem: Mobility - Impaired:  Goal: Mobility will improve to maximum level  Description: Mobility will improve to maximum level  Outcome: Ongoing     Problem: Pain - Acute:  Goal: Pain level will decrease  Description: Pain level will decrease  Outcome: Ongoing     Problem: Venous Thromboembolism:  Goal: Absence of deep vein thrombosis  Description: Absence of deep vein thrombosis  Outcome: Ongoing  Goal: Absence of signs or symptoms of impaired coagulation  Description: Absence of signs or symptoms of impaired coagulation  Outcome: Ongoing  Goal: Will show no signs or symptoms of venous thromboembolism  Description: Will show no signs or symptoms of venous thromboembolism  Outcome: Ongoing     Problem:  Activity:  Goal: Ability to ambulate will improve  Description: Ability to ambulate will improve  Outcome: Ongoing  Goal: Ability to perform activities at highest level will improve  Description: Ability to perform activities at highest level will improve  Outcome: Ongoing     Problem: Health Behavior:  Goal: Identification of resources available to assist in meeting health care needs will improve  Description: Identification of resources available to assist in meeting health care needs will improve  Outcome: Ongoing     Problem: Nutritional:  Goal: Ability to attain and maintain optimal nutritional status will improve  Description: Ability to attain and maintain optimal nutritional status will improve  Outcome: Ongoing     Problem: Physical Regulation:  Goal: Will remain free from infection  Description: Will remain free from infection  Outcome: Ongoing  Goal: Postoperative complications will be avoided or minimized  Description: Postoperative complications will be avoided or minimized  Outcome: Ongoing  Goal: Diagnostic test results will improve  Description: Diagnostic test results will improve  Outcome: Ongoing     Problem: Respiratory:  Goal: Ability to maintain adequate ventilation will improve  Description: Ability to maintain adequate ventilation will improve  Outcome: Ongoing     Problem: Safety:  Goal: Ability to remain free from injury will improve  Description: Ability to remain free from injury will improve  Outcome: Ongoing     Problem: Self-Care:  Goal: Ability to meet self-care needs will improve  Description: Ability to meet self-care needs will improve  Outcome: Ongoing     Problem: Self-Concept:  Goal: Ability to maintain and perform role responsibilities to the fullest extent possible will improve  Description: Ability to maintain and perform role responsibilities to the fullest extent possible will improve  Outcome: Ongoing  Goal: Verbalizations of decreased anxiety will increase  Description: Verbalizations of decreased anxiety will increase  Outcome: Ongoing     Problem: Sensory:  Goal: Pain level will decrease  Description: Pain level will decrease  Outcome: Ongoing     Problem: Skin Integrity:  Goal: Demonstration of wound healing without infection will improve  Description: Demonstration of wound healing without infection will improve  Outcome: Ongoing  Goal: Risk for impaired skin integrity will decrease  Description: Risk for impaired skin integrity will decrease  Outcome: Ongoing     Problem: Tissue Perfusion:  Goal: Peripheral tissue perfusion will improve  Description: Peripheral tissue perfusion will improve  Outcome: Ongoing  Goal: Risk of venous thrombosis will decrease  Description: Risk of venous thrombosis will decrease  Outcome: Ongoing     Problem: Urinary Elimination:  Goal: Ability to reestablish a normal urinary elimination pattern will improve - after catheter removal  Description: Ability to reestablish a normal urinary elimination pattern will improve  Outcome: Ongoing     Problem: Mental Status - Impaired:  Goal: Mental status will improve  Description: Mental status will improve  Outcome: Ongoing

## 2021-02-15 NOTE — PROGRESS NOTES
12 hour chart check review completed. Electronically signed by Lauren Bernstein LPN on 0/00/5792 at 1:53 AM

## 2021-02-15 NOTE — PROGRESS NOTES
Pharmacy Note  Vancomycin Consult    José Mishra is a 67 y.o. male started on Vancomycin for Sepsis; consult received from Dr. Jim Brito to manage therapy. Also receiving the following antibiotics: Maxipime    Principal Problem:    Hip fracture requiring operative repair, left, closed, initial encounter [facility])  Active Problems:    Hyperglycemia    Hypertension    Fall from slipping on ice  Resolved Problems:    * No resolved hospital problems. *      Allergies:  Patient has no known allergies. Temp max: 98.3    Recent Labs     02/14/21  0429 02/14/21  1657   BUN 24* 31*       Recent Labs     02/14/21  0429 02/14/21  1657   CREATININE 1.0 1.3*       Recent Labs     02/14/21  0429 02/14/21  1657   WBC 15.1* 16.1*         Intake/Output Summary (Last 24 hours) at 2/14/2021 2001  Last data filed at 2/14/2021 1200  Gross per 24 hour   Intake 1644 ml   Output 300 ml   Net 1344 ml     No current cultures at this time  Culture Date Source Results                      Ht Readings from Last 1 Encounters:   11/18/16 6' (1.829 m)        Wt Readings from Last 1 Encounters:   11/18/16 185 lb (83.9 kg)         There is no height or weight on file to calculate BMI. CrCl cannot be calculated (Unknown ideal weight. ). Assessment/Plan:  Will initiate vancomycin 1250 mg IV every 24 hours. Timing of trough level will be determined based on culture results, renal function, and clinical response. Thank you for the consult. Will continue to follow.     Electronically signed by Ilya Euceda, [address] [place] [address] on 2/14/2021 at 8:01 PM

## 2021-02-15 NOTE — PROGRESS NOTES
Physical Therapy  Name: Onelia Herrera  MRN:  [id number]  Date of service:  2/15/2021     02/15/21 1442   Restrictions/Precautions   Restrictions/Precautions Fall Risk;Weight Bearing   Required Braces or Orthoses? No   Lower Extremity Weight Bearing Restrictions   Left Lower Extremity Weight Bearing Weight Bearing As Tolerated   General   Chart Reviewed Yes   Subjective   Subjective Pt ready to get back to bed. Pain Screening   Patient Currently in Pain Denies  (states that his pain is under control at this time)   Intervention List Patient able to continue with treatment   Bed Mobility   Sit to Supine Moderate assistance  (x2)   Scooting Dependent/Total;2 Person assistance   Transfers   Sit to Stand Moderate Assistance;2 Person Assistance   Stand to sit Moderate Assistance;2 Person Assistance   Bed to Chair Moderate assistance;2 Person Assistance   Comment Pt able to stand and step back to bed, cont to exhibit posterior lean at first but able to correct with cues   Short term goals   Time Frame for Short term goals 2 wks   Short term goal 1 supine to sit indep   Short term goal 2 sit to stand indep   Short term goal 3 amb. 100' with RW SBA   Short term goal 4 up/down 3 stairs SBA   Conditions Requiring Skilled Therapeutic Intervention   Body structures, Functions, Activity limitations Decreased functional mobility ; Decreased ROM; Decreased strength;Decreased safe awareness;Decreased cognition;Decreased endurance;Decreased posture; Increased pain;Decreased balance;Decreased coordination   Assessment Pt cont to require assist with all bed mobility and to stand and step back to bed from recliner. Cont to exhibit posterior lean once standing but is able to correct with cues. Pt returned to supine and positioned for comfort with all needs in reach. Activity Tolerance   Activity Tolerance Patient limited by endurance; Patient limited by cognitive status   Safety Devices   Type of devices Bed alarm in place;Call light within reach;Gait belt;Left in bed         Electronically signed by Liudmila Murphy PTA on 2/15/2021 at 2:50 PM

## 2021-02-15 NOTE — PROGRESS NOTES
Vancomycin Day: 2  Current Dosin mg every 24 hours    Temp max:  99.1    Recent Labs     21  1657 02/15/21  0529   BUN 31* 29*       Recent Labs     21  1657 02/15/21  0529   CREATININE 1.3* 1.0       Recent Labs     21  1657 02/15/21  0529   WBC 16.1* 11.6*         Intake/Output Summary (Last 24 hours) at 2/15/2021 1243  Last data filed at 2/15/2021 0915  Gross per 24 hour   Intake 2686 ml   Output 125 ml   Net 2561 ml       Culture Date Source Results   21 urine sent   21 blood sent            Ht Readings from Last 1 Encounters:   21 6' (1.829 m)        Wt Readings from Last 1 Encounters:   21 195 lb (88.5 kg)         Body mass index is 26.45 kg/m². Estimated Creatinine Clearance: 69 mL/min (based on SCr of 1 mg/dL).     Level ordered for: rescheduled for new regimen    Assessment/Plan:  CrCl improved today to 69 ml/min so will change Vancomycin to 1000 mg every 12 hours for predicted trough of  14     Electronically signed by Gabrielle Ceja, [facility] on 2/15/2021 at 12:43 PM

## 2021-02-15 NOTE — PROGRESS NOTES
Occupational Therapy  Facility/Department: [facility] SURG SERVICES  Daily Treatment Note  NAME: Jean Minor  : 1945  MRN: [id number]    Date of Service: 2/15/2021    Discharge Recommendations:          Assessment   Performance deficits / Impairments: Decreased functional mobility ; Decreased ADL status; Decreased strength;Decreased endurance  Assessment: Worked on getting pt BTB. Pt transfered from chair to bed with mod assist x 2. Pt required max vc's for safe technique. Pt would benefit from continued therapy to address above deficits. Prognosis: Good  REQUIRES OT FOLLOW UP: Yes  Activity Tolerance  Activity Tolerance: Patient limited by fatigue         Patient Diagnosis(es): The primary encounter diagnosis was Closed fracture of left hip, initial encounter ([facility] [address] Diagnoses of Fall due to slipping on ice or snow, initial encounter and Essential hypertension were also pertinent to this visit. has a past medical history of Cerebral artery occlusion with cerebral infarction ([facility] [address] and Hypertension. has a past surgical history that includes Femur fracture surgery (Left, 2021). Restrictions  Restrictions/Precautions  Restrictions/Precautions: Fall Risk, Weight Bearing  Required Braces or Orthoses?: No  Lower Extremity Weight Bearing Restrictions  Left Lower Extremity Weight Bearing: Weight Bearing As Tolerated  Subjective   General  Chart Reviewed: Yes  Patient assessed for rehabilitation services?: Yes  Response to previous treatment: Patient with no complaints from previous session  Family / Caregiver Present: No  Vital Signs  Patient Currently in Pain: Denies(Pt states his pain is under control at this time.)   Orientation     Objective             Balance  Sitting Balance: Stand by assistance  Bed mobility  Sit to Supine: Moderate assistance;2 Person assistance  Scooting: Dependent/Total;2 Person assistance  Transfers  Stand Step Transfers:  Moderate assistance;2 Person assistance  Sit to stand: Moderate assistance;2 Person assistance  Stand to sit: Moderate assistance;2 Person assistance                                                                 Plan   Plan  Times per week: 3-5 days/wk.   G-Code     OutComes Score                                                  AM-PAC Score             Goals          Therapy Time   Individual Concurrent Group Co-treatment   Time In           Time Out           Minutes                   STEFANIE Walker

## 2021-02-15 NOTE — PROGRESS NOTES
Orthopedic Surgery Progress Note    Shyla Nugent  2/15/2021      Subjective:     No acute events overnight. Denies active chest pain or shortness of breath. Denies numbness or tingling to the left lower extremity. Did not work with therapy yesterday. Objective:     Patient Vitals for the past 24 hrs:   BP Temp Temp src Pulse Resp SpO2 Height Weight   02/15/21 0236 127/73 99.1 °F (37.3 °C) Temporal 90 18 90 % -- --   02/14/21 2019 134/64 98.7 °F (37.1 °C) Temporal 106 18 90 % 6' (1.829 m) 195 lb (88.5 kg)   02/14/21 1547 112/66 -- -- -- -- -- -- --   02/14/21 1046 130/76 -- -- 68 -- -- -- --     General: Awake, alert, no acute distress  Respiratory: Nonlabored respirations  Cardiovascular: Regular rate  Left lower extremity: Dressings are in place, clean, dry and intact. No significant edema, erythema or ecchymosis. EHL, FHL, tibialis anterior, gastrocsoleus complex motor intact. Gross sensation is intact to the left foot. Left foot is warm and perfused.     Data Review:  Recent Labs     02/14/21  1657 02/15/21  0529   HGB 11.1* 9.4*     Recent Labs     02/15/21  0529      K 4.1   CREATININE 1.0       Assessment:     68 y/o M POD#2 s/p L CMN, significant medical history for hypertension and chronic tobacco use    Plan:   Pain: Controlled, continue current regimen  Acute post-op blood loss anemia: H/H 9.4/29.6, VSS, no plans for transfusion  Leukocytosis: Improving  DVT prophylaxis: PAS boots, lovenox, plans to transition to 325mg ASA as outpatient for one month  Physical therapy/Occupational therapy  Activity: Weight bearing as tolerated LLE, ice/elevate  Dressing: Plan to keep in place for 2 weeks unless they lose adhesion or become saturated, then apply new Mepilex dressings  Disposition: Await PT/OT rec's, discharge planning, okay for discharge from orthopedic standpoint when deemed stable, follow-up 2 weeks in clinic.       Electronically signed by Elvia Vital MD on 2/15/2021 at 7:10 AM

## 2021-02-15 NOTE — PROGRESS NOTES
[facility]    Patient:  Jessica Mcmahon  YOB: 1945  Date of Service: 2/15/2021  MRN: [id number]   Acct: [de-identified]   Primary Care Physician: Miguel Appiah DO  Advance Directive: Full Code  Admit Date: 2/13/2021       Hospital Day: 2      CHIEF COMPLAINT:    Chief Complaint   Patient presents with    Fall    Hip Injury       2/15/2021 9:14 AM  Subjective / Interval History:   02/15/2021  Patient with reported acute change in mental status with some agitation and delirium yesterday. Patient seen and examined this AM.  No new complaints. Mental status improved. Laying comfortably in bed in no acute distress. No other acute changes or acute overnight event reported. Left hip pain fairly controlled. 02/14/2021  Patient seen and examined this AM.  Doing well. No new complaints. Left hip pain fairly controlled. Laying comfortably in bed in no acute distress. No acute changes or acute overnight event reported. Status post cephalomedullary nailing of left hip yesterday. Doing well postop. Review of Systems:   Review of Systems  ROS: 14 point review of systems is negative except as specifically addressed above. DIET CARDIAC;     Intake/Output Summary (Last 24 hours) at 2/15/2021 0914  Last data filed at 2/15/2021 0640  Gross per 24 hour   Intake 2568 ml   Output --   Net 2568 ml       Medications:   dextrose      sodium chloride 125 mL/hr at 02/14/21 2032     Current Facility-Administered Medications   Medication Dose Route Frequency Provider Last Rate Last Admin    nicotine (NICODERM CQ) 21 MG/24HR 1 patch  1 patch Transdermal Daily Clary Lomeli MD        insulin lispro (HUMALOG) injection vial 0-6 Units  0-6 Units Subcutaneous TID  Clary Lomeli MD        insulin lispro (HUMALOG) injection vial 0-3 Units  0-3 Units Subcutaneous Nightly Clary Lomeli MD        glucose (GLUTOSE) 40 % oral gel 15 g  15 g Oral PRN Luis Damme Lucie [place] MD        dextrose 50 % IV solution  12.5 g Intravenous PRN Gibson Stark MD        glucagon (rDNA) injection 1 mg  1 mg Intramuscular PRN Gibson Stark MD        dextrose 5 % solution  100 mL/hr Intravenous PRN iGbson Stark MD        0.9 % sodium chloride infusion   Intravenous Continuous Gibson Stark  mL/hr at 02/14/21 2032 New Bag at 02/14/21 2032    ceFEPIme (MAXIPIME) 2,000 mg in sodium chloride (PF) 20 mL IV syringe  2,000 mg Intravenous Q12H Gibson Stark MD   2,000 mg at 02/15/21 6810    vancomycin (VANCOCIN) intermittent dosing (placeholder)   Other RX Placeholder Gibson Stark MD        vancomycin (VANCOCIN) 1,250 mg in dextrose 5 % 250 mL IVPB  1,250 mg Intravenous Q24H Gibson Stark MD   Stopped at 02/14/21 2300    ondansetron (ZOFRAN) injection 4 mg  4 mg Intravenous Once Hoang Wen MD        sodium chloride flush 0.9 % injection 10 mL  10 mL Intravenous 2 times per day Gibson Stark MD   10 mL at 02/14/21 0813    sodium chloride flush 0.9 % injection 10 mL  10 mL Intravenous PRN Gibson Stark MD        potassium chloride (KLOR-CON M) extended release tablet 40 mEq  40 mEq Oral PRN Gibson Stark MD        Or    potassium bicarb-citric acid (EFFER-K) effervescent tablet 40 mEq  40 mEq Oral PRN Gibson Stark MD        Or    potassium chloride 10 mEq/100 mL IVPB (Peripheral Line)  10 mEq Intravenous PRN Gibson Stark MD        promethazine (PHENERGAN) tablet 12.5 mg  12.5 mg Oral Q6H PRN Gibson Stark MD        Or    ondansetron [facility]) injection 4 mg  4 mg Intravenous Q6H PRN Gibson Stark MD        magnesium hydroxide (MILK OF MAGNESIA) 400 MG/5ML suspension 30 mL  30 mL Oral Daily PRN Gibson Stark MD        acetaminophen (TYLENOL) tablet 650 mg  650 mg Oral Q6H PRN Gibson Stark MD        Or    acetaminophen (TYLENOL) suppository 650 mg  650 mg Rectal Q6H PRN Elspeth Setting Lisa Dubon MD        sennosides-docusate sodium (SENOKOT-S) 8.6-50 MG tablet 1 tablet  1 tablet Oral BID Kiko Puga MD   1 tablet at 02/14/21 2032    oxyCODONE-acetaminophen (PERCOCET) 5-325 MG per tablet 1 tablet  1 tablet Oral Q4H PRN Kiko Puga MD   1 tablet at 02/14/21 0811    oxyCODONE-acetaminophen (PERCOCET) 5-325 MG per tablet 2 tablet  2 tablet Oral Q4H PRN Kiko Puga MD   2 tablet at 02/15/21 0130    enoxaparin (LOVENOX) injection 40 mg  40 mg Subcutaneous Daily Kiko Puga MD   40 mg at 02/14/21 0813    lisinopril (PRINIVIL;ZESTRIL) tablet 20 mg  20 mg Oral Daily Rona Schmitz MD   20 mg at 02/14/21 3709    And    hydroCHLOROthiazide (HYDRODIURIL) tablet 25 mg  25 mg Oral Daily Rona Schmitz MD   25 mg at 02/14/21 0805         dextrose      sodium chloride 125 mL/hr at 02/14/21 2032      nicotine  1 patch Transdermal Daily    insulin lispro  0-6 Units Subcutaneous TID WC    insulin lispro  0-3 Units Subcutaneous Nightly    cefepime  2,000 mg Intravenous Q12H    vancomycin (VANCOCIN) intermittent dosing (placeholder)   Other RX Placeholder    vancomycin  1,250 mg Intravenous Q24H    ondansetron  4 mg Intravenous Once    sodium chloride flush  10 mL Intravenous 2 times per day    sennosides-docusate sodium  1 tablet Oral BID    enoxaparin  40 mg Subcutaneous Daily    lisinopril  20 mg Oral Daily    And    hydroCHLOROthiazide  25 mg Oral Daily     glucose, dextrose, glucagon (rDNA), dextrose, sodium chloride flush, potassium chloride **OR** potassium alternative oral replacement **OR** potassium chloride, promethazine **OR** ondansetron, magnesium hydroxide, acetaminophen **OR** acetaminophen, oxyCODONE-acetaminophen, oxyCODONE-acetaminophen  DIET CARDIAC;       Labs:   CBC with DIFF:  Recent Labs     02/14/21  0429 02/14/21  1657 02/15/21  0529   WBC 15.1* 16.1* 11.6*   RBC 3.66* 3.46* 2.98*   HGB 11.4* 11.1* 9.4*   HCT 36.4* 34.2* 29.6*   MCV 99.5* 98.8* 99.3*   MCH 31.1* 32.1* 31.5*   MCHC 31.3* 32.5* 31.8*   RDW 12.2 12.3 12.4    150 129*   MPV 10.9 10.7 11.4   NEUTOPHILPCT 83.6* 84.9* 76.5*   LYMPHOPCT 9.2* 7.5* 15.7*   MONOPCT 6.8 7.0 7.0   EOSRELPCT 0.0 0.1 0.1   BASOPCT 0.1 0.1 0.2   NEUTROABS 12.6* 13.7* 8.9*   LYMPHSABS 1.4 1.2 1.8   MONOSABS 1.00* 1.10* 0.80   EOSABS 0.00 0.00 0.00   BASOSABS 0.00 0.00 0.00       CMP/BMP:  Recent Labs     02/13/21  1144 02/14/21  0429 02/14/21  1657 02/15/21  0529    134* 130* 136   K 3.8 4.9 4.5 4.1    98 94* 102   CO2 30* 27 24 27   ANIONGAP 4* 9 12 7   GLUCOSE 128* 161* 145* 112*   BUN 19 24* 31* 29*   CREATININE 0.8 1.0 1.3* 1.0   LABGLOM >60 >60 54* >60   CALCIUM 8.9 8.6* 8.8 8.1*   PROT 7.3  --  6.7  --    LABALBU 3.8  --  3.5  --    BILITOT 0.3  --  0.6  --    ALKPHOS 58  --  49  --    ALT 7  --  8  --    AST 13  --  18  --          CRP:  No results for input(s): CRP in the last 72 hours. Sed Rate:  No results for input(s): SEDRATE in the last 72 hours. HgBA1c:  No components found for: HGBA1C  FLP:  No results found for: TRIG, HDL, LDLCALC, LDLDIRECT, LABVLDL  TSH:  No results found for: TSH  Troponin T: No results for input(s): TROPONINI in the last 72 hours. Pro-BNP: No results for input(s): BNP in the last 72 hours. INR:   Recent Labs     02/13/21  1144   INR 1.07     ABGs: No results found for: PHART, PO2ART, FYN6VDY  UA:No results for input(s): NITRITE, COLORU, PHUR, LABCAST, WBCUA, RBCUA, MUCUS, TRICHOMONAS, YEAST, BACTERIA, CLARITYU, SPECGRAV, LEUKOCYTESUR, UROBILINOGEN, BILIRUBINUR, BLOODU, GLUCOSEU, AMORPHOUS in the last 72 hours. Invalid input(s): Maxwell Crate      Culture Results:    No results for input(s): CXSURG in the last 720 hours. Blood Culture Recent: No results for input(s): BC in the last 720 hours. Cultures:   No results for input(s): CULTURE in the last 72 hours. No results for input(s): BC, [place] in the last 72 hours.   No results for input(s): CXSURG in the last 72 hours. No results for input(s): MG, PHOS in the last 72 hours. Recent Labs     02/13/21  1144 02/14/21  1657   AST 13 18   ALT 7 8   BILITOT 0.3 0.6   ALKPHOS 58 49         RAD:   Xr Femur Left (min 2 Views)    Result Date: 2/13/2021  EXAMINATION: XR FEMUR LEFT (MIN 2 VIEWS) 2/13/2021 3:21 PM HISTORY: Left hip fracture, open reduction internal fixation. Fluoroscopy time: 1 minute 3 seconds. Radiation dose: 0.2846 mGym2 Number fluoroscopic images acquired: 4. Report: 4 separate intraoperative fluoroscopic spot views were obtained under the supervision of the orthopedic surgery service, during open reduction internal fixation of the intratrochanteric left hip fracture, with satisfactory hardware positioning and alignment. Images are stored in PACS for review. Signed by Dr Sara Beckham on 2/13/2021 3:22 PM    Xr Femur Left (min 2 Views)    Result Date: 2/13/2021  EXAMINATION: XR FEMUR LEFT (MIN 2 VIEWS) 2/13/2021 1:38 PM HISTORY: Fall, acute left hip fracture. Report: 3 views of the left femur were obtained. COMPARISON: X-rays of the pelvis left hip from the same day. There is acute vertically oriented closed fracture within the intertrochanteric aspect of the left hip, with mild lateral displacement and no involvement of the femoral head. The mid shaft and distal femur are unremarkable. There is soft tissue swelling in the region of the fracture. Acute closed vertically oriented intertrochanteric left hip fracture with mild lateral displacement. The mid shaft and distal left femur are unremarkable. Signed by Dr Sara Beckham on 2/13/2021 1:39 PM    Xr Chest Portable    Result Date: 2/13/2021  EXAMINATION: XR CHEST PORTABLE 2/13/2021 12:16 PM HISTORY: Fall, pain. Report: A portable frontal view of the chest was obtained. COMPARISON: There are no correlative imaging studies for comparison. The lungs are clear, well expanded. Heart size is normal. There is mild ectasia of the thoracic aorta. No pneumothorax or significant pleural effusion is identified. The osseous structures and upper abdomen are unremarkable. No acute cardiopulmonary abnormality. Signed by Dr Aziza Beckham on 2/13/2021 12:18 PM    Xr Hip 2-3 Vw W Pelvis Left    Result Date: 2/13/2021  EXAMINATION: XR HIP 2-3 VW W PELVIS LEFT 2/13/2021 12:14 PM HISTORY: Fall, left hip pain. Report: An AP view the pelvis was obtained as well as AP and crosstable lateral views of the left hip. COMPARISON: There are no correlative imaging studies for comparison. The right hip is unremarkable. There is an acute  intratrochanteric left hip fracture oriented vertically, with mild lateral displacement, no significant angulation or involvement of the femoral head. The other pelvic osseous structures appear unremarkable. Acute mildly displaced closed intratrochanteric left hip fracture as described. Signed by Dr Aziza Beckham on 2/13/2021 12:16 PM        Objective:   Vitals:   /73   Pulse 86   Temp 99.1 °F (37.3 °C) (Temporal)   Resp 18   Ht 6' (1.829 m)   Wt 195 lb (88.5 kg)   SpO2 97%   BMI 26.45 kg/m²       Patient Vitals for the past 24 hrs:   BP Temp Temp src Pulse Resp SpO2 Height Weight   02/15/21 0728 -- -- Temporal 86 18 97 % -- --   02/15/21 0236 127/73 99.1 °F (37.3 °C) Temporal 90 18 90 % -- --   02/14/21 2019 134/64 98.7 °F (37.1 °C) Temporal 106 18 90 % 6' (1.829 m) 195 lb (88.5 kg)   02/14/21 1547 112/66 -- -- -- -- -- -- --   02/14/21 1046 130/76 -- -- 68 -- -- -- --       24HR INTAKE/OUTPUT:      Intake/Output Summary (Last 24 hours) at 2/15/2021 0914  Last data filed at 2/15/2021 0640  Gross per 24 hour   Intake 2568 ml   Output --   Net 2568 ml       Physical Exam  Vitals signs and nursing note reviewed. Constitutional:       General: He is not in acute distress. Appearance: Normal appearance. He is not ill-appearing, toxic-appearing or diaphoretic. HENT:      Head: Normocephalic and atraumatic.       Right Ear: External ear normal.      Left Ear: External ear normal.      Nose: Nose normal. No congestion or rhinorrhea. Mouth/Throat:      Mouth: Mucous membranes are moist.      Pharynx: Oropharynx is clear. No oropharyngeal exudate or posterior oropharyngeal erythema. Eyes:      General: No scleral icterus. Right eye: No discharge. Left eye: No discharge. Extraocular Movements: Extraocular movements intact. Conjunctiva/sclera: Conjunctivae normal.   Neck:      Musculoskeletal: Normal range of motion and neck supple. No neck rigidity or muscular tenderness. Vascular: No carotid bruit. Cardiovascular:      Rate and Rhythm: Normal rate and regular rhythm. Pulses: Normal pulses. Heart sounds: Normal heart sounds. No murmur. No friction rub. No gallop. Pulmonary:      Effort: Pulmonary effort is normal. No respiratory distress. Breath sounds: Normal breath sounds. No stridor. No wheezing, rhonchi or rales. Chest:      Chest wall: No tenderness. Abdominal:      General: Bowel sounds are normal. There is no distension. Palpations: Abdomen is soft. Tenderness: There is no abdominal tenderness. There is no guarding or rebound. Musculoskeletal:         General: No swelling, tenderness, deformity or signs of injury. Right lower leg: No edema. Left lower leg: No edema. Comments: Decreased ROM in left hip due to pain. Intact ROM in bilateral upper and right lower extremities. Skin:     General: Skin is warm and dry. Capillary Refill: Capillary refill takes less than 2 seconds. Coloration: Skin is not jaundiced or pale. Findings: No bruising, erythema, lesion or rash. Neurological:      General: No focal deficit present. Mental Status: He is alert and oriented to person, place, and time. Cranial Nerves: No cranial nerve deficit. Sensory: No sensory deficit. Motor: No weakness.       Coordination: Coordination normal.   Psychiatric:         Mood and Affect: Mood normal.         Behavior: Behavior normal.         Thought Content: Thought content normal.         Judgment: Judgment normal.           Assessment/plan:         Hospital Problems           Last Modified POA    * (Principal) Hip fracture requiring operative repair, left, closed, initial encounter ([facility] [address] 2/13/2021 Yes    Hyperglycemia 2/13/2021 Yes    Hypertension 2/13/2021 Yes    Fall from slipping on ice 2/13/2021 Yes          Principal Problem:    Hip fracture requiring operative repair, left, closed, initial encounter [facility])  Active Problems:    Hyperglycemia    Hypertension    Fall from slipping on ice  Resolved Problems:    * No resolved hospital problems. *      Brief Summary  Mr. Isamar Haider, a 67 y.o. male with a history of HTN, presenting to [address] [place] [address] ED (02/13/2021) on account an acute onset of moderate to severe*left hip pain, after reported mechanical fall.     Patient reportedly slipped and fell onto the ice, landing on his left hip. Patient reportedly unable to ambulate after the fall due to pain.      Nno other associated symptoms reported. No dizziness, lightheadedness or palpitation reported.     X-ray reported an acute mildly displaced closed intratrochanteric left hip  Fracture     Orthopedic surgery consulted from ED  Patient admitted for further work-up and management. Sepsis  Altered mental status  Leukocytosis, with a left shift  · Likely delirium with superimposed ?   Infectious etiology  · WBC of 10.6 on presentation (02/23/2021)  · Briefly worsened leukocytosis: WBC of 15.1--> 16.1 (02/14/2021), with a left shift  · Leukocytosis now improving after initiation of empiric antibiotics  · WBC of 11.6 (02/15/2021)  · Elevated procalcitonin level  · Initial lactic acid of 2.3 (02/14/2021)  · Repeat lactic acid of 1.1, after IV fluids  · Continue empiric antibiotics (vancomycin and cefepime)  · Blood cultures pending  · Pending urine cultures    Acute mildly displaced closed intratrochanteric left hip  Fracture  · Status post mechanical fall from slipping on ice  · Orthopedics following  · Status post cephalomedullary nailing of left intratrochanteric hip fracture. (02/13/2021)  · Continue symptomatic and supportive management including pain management as needed. · PT OT       History of hypertension  · Continue home regimen  · Lisinopril-HCTZ 20-25 mg p.o. daily     Hyperglycemia  · No documented history of diabetes  · Hemoglobin A1c level: 5.6%  · However patient with persistent hyperglycemia  · Glucose of 161 (02/1/2021)  · Insulin as part on low-dose sliding scale  · Continue monitoring POC glucose           Continue management of other chronic medical conditions - see above and orders. Advance Directive: Full Code    DIET CARDIAC;         Consults Made:   IP CONSULT TO ORTHOPEDIC SURGERY  IP CONSULT TO SOCIAL WORK  PHARMACY TO DOSE VANCOMYCIN    DVT prophylaxis: Enoxaparin      Discharge planning: tbd    Time Spent is 25 mins in the examination, evaluation, counseling and review of medications, assessment and plan.      Electronically signed by   Taisha Saucedo MD, MPH,   Internal Medicine Hospitalist   2/15/2021 9:14 AM

## 2021-02-15 NOTE — PROGRESS NOTES
Pt expressed to RN several times that he wanted to leave this afternoon. RN explained that pt needed to wait for therapy to see how well he would be able to ambulate after hip surgery. Pt agreeable but still said he planned on leaving today. 1400  Pt continued to grow more agitated and restless. Pt said he would be leaving today and had to get back to his car garage and that was more important to him that being at the hospital. Doctor notified that pt might leave AMA today. 1600  Pt became very agitated and aggressive when wife and brother arrived to visit. Pt also became very disoriented stating he was at the garage and [place]. Pt started throwing items in the floor and acting aggressive toward his wife. Code violet called. Security, clinical house and physician responded. Situation was de-escalated and was then taken for a head CT. New labs drawn. Pt now sleeping in bed.     Electronically signed by Francesca Ryder RN on 2/14/21 at 7:41 PM CST

## 2021-02-15 NOTE — PROGRESS NOTES
Physical Therapy  Name: Kika Vargas  MRN:  [id number]  Date of service:  2/15/2021     02/15/21 1144   Restrictions/Precautions   Restrictions/Precautions Fall Risk;Weight Bearing   Required Braces or Orthoses? No   Lower Extremity Weight Bearing Restrictions   Left Lower Extremity Weight Bearing Weight Bearing As Tolerated   General   Chart Reviewed Yes   Subjective   Subjective Pt in bed, agrees to work with therapy. Pain Screening   Patient Currently in Pain Yes   Intervention List Patient able to continue with treatment   Pain Assessment   Pain Assessment 0-10   Pain Level 10  (RN notified of pt's request for pain medication)   Pain Type Acute pain;Surgical pain   Pain Location Hip   Pain Orientation Left   Pain Descriptors Aching; Sore   Pain Frequency Continuous   Pain Onset On-going   Clinical Progression Not changed   Functional Pain Assessment Prevents or interferes with all active and some passive activities   Non-Pharmaceutical Pain Intervention(s) Ambulation/Increased Activity;Cold applied;Repositioned   Response to Pain Intervention Patient Satisfied   Bed Mobility   Supine to Sit Moderate assistance   Comment Pt able to sit EOB unsupported by therapist once fully upright x15 min   Transfers   Sit to Stand Moderate Assistance;2 Person Assistance   Stand to sit Moderate Assistance   Bed to Chair Moderate assistance;2 Person Assistance   Comment Pt able to stand and step from bed to recliner, cont to exhibit posterior lean but able to correct with cues. Able to turn and then bring chair up behind him to sit. Short term goals   Time Frame for Short term goals 2 wks   Short term goal 1 supine to sit indep   Short term goal 2 sit to stand indep   Short term goal 3 amb. 100' with RW SBA   Short term goal 4 up/down 3 stairs SBA   Conditions Requiring Skilled Therapeutic Intervention   Body structures, Functions, Activity limitations Decreased functional mobility ; Decreased ROM; Decreased strength;Decreased safe awareness;Decreased cognition;Decreased endurance;Decreased posture; Increased pain;Decreased balance;Decreased coordination   Assessment Pt cont to require assist with bed mobility and TF, able to stand and step to recliner with mod assist x2 d/t posterior lean and pain in LLE. Pt positioned for comfort with all needs in reach. Activity Tolerance   Activity Tolerance Patient limited by pain; Patient limited by cognitive status   Safety Devices   Type of devices Call light within reach; Chair alarm in place;Gait belt;Left in chair         Electronically signed by Nikolas Quick PTA on 2/15/2021 at 11:52 AM

## 2021-02-15 NOTE — PROGRESS NOTES
Left  Pain Descriptors: Discomfort;Tender  Functional Pain Assessment: Prevents or interferes with all active and some passive activities  Non-Pharmaceutical Pain Intervention(s): Ambulation/Increased Activity;Cold applied;Elevation;Repositioned(Education provided on edema management which in turn may help manage the pain. 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       Objective        Orientation  Overall Orientation Status: Impaired  Orientation Level: Oriented to person;Disoriented to place; Disoriented to situation(needs cues and prompts)     Balance  Sitting Balance: Stand by assistance  ADL  Feeding: Independent  Grooming: Independent;Setup  UE Bathing: Minimal assistance  LE Bathing: Moderate assistance  UE Dressing: Supervision;Setup  LE Dressing: Moderate assistance  Toileting: Moderate assistance           Transfers  Stand Step Transfers: Moderate assistance;2 Person assistance  Sit to stand: Moderate assistance;2 Person assistance  Stand to sit: Moderate assistance;2 Person assistance     Cognition  Cognition Comment: Awake, alert, pleasant, cooperative, reasonable, but still with general confusion so redirection is required. Appears to be clearing up in comparison to previous notes. LUE AROM (degrees)  LUE AROM : WFL  RUE AROM (degrees)  RUE AROM : WFL                    Tx initiated:  Pain management/edema management strategies, therapeutic activities, movement strategies. 30  Mins.     Report given to [facility] Plan  Times per week: 3-5 days/wk.     G-Code     OutComes Score                                                  AM-PAC Score             Goals  Short term goals  Short term goal 1: Min A for dressing  Short term goal 2: Min A for toileting  Short term goal 3: Min A for transfers  Long term goals  Long term goal 1: Upgrade as tolerated       Therapy Time   Individual Concurrent Group Co-treatment   Time In           Time Out           Minutes                   Love Martines OT Electronically signed by Love Martines OT on 2/15/2021 at 4:09 PM

## 2021-02-16 PROBLEM — R41.82 ALTERED MENTAL STATE: Status: ACTIVE | Noted: 2021-02-16

## 2021-02-16 PROBLEM — F17.200 SMOKER: Status: ACTIVE | Noted: 2021-02-16

## 2021-02-16 LAB
AMMONIA: 10 UMOL/L (ref 16–60)
ANION GAP SERPL CALCULATED.3IONS-SCNC: 8 MMOL/L (ref 7–19)
BASE EXCESS ARTERIAL: -0.8 MMOL/L (ref -2–2)
BASOPHILS ABSOLUTE: 0 K/UL (ref 0–0.2)
BASOPHILS RELATIVE PERCENT: 0.1 % (ref 0–1)
BUN BLDV-MCNC: 21 MG/DL (ref 8–23)
CALCIUM SERPL-MCNC: 8.2 MG/DL (ref 8.8–10.2)
CARBOXYHEMOGLOBIN ARTERIAL: 2.5 % (ref 0–5)
CHLORIDE BLD-SCNC: 100 MMOL/L (ref 98–111)
CO2: 27 MMOL/L (ref 22–29)
CREAT SERPL-MCNC: 0.8 MG/DL (ref 0.5–1.2)
EKG P AXIS: 66 DEGREES
EKG P-R INTERVAL: 180 MS
EKG Q-T INTERVAL: 408 MS
EKG QRS DURATION: 100 MS
EKG QTC CALCULATION (BAZETT): 429 MS
EKG T AXIS: 76 DEGREES
EOSINOPHILS ABSOLUTE: 0.1 K/UL (ref 0–0.6)
EOSINOPHILS RELATIVE PERCENT: 0.7 % (ref 0–5)
GFR AFRICAN AMERICAN: >59
GFR NON-AFRICAN AMERICAN: >60
GLUCOSE BLD-MCNC: 111 MG/DL (ref 70–99)
GLUCOSE BLD-MCNC: 111 MG/DL (ref 70–99)
GLUCOSE BLD-MCNC: 112 MG/DL (ref 70–99)
GLUCOSE BLD-MCNC: 117 MG/DL (ref 70–99)
GLUCOSE BLD-MCNC: 93 MG/DL (ref 74–109)
HCO3 ARTERIAL: 23.2 MMOL/L (ref 22–26)
HCT VFR BLD CALC: 27.6 % (ref 42–52)
HEMOGLOBIN, ART, EXTENDED: 9.6 G/DL (ref 14–18)
HEMOGLOBIN: 8.9 G/DL (ref 14–18)
IMMATURE GRANULOCYTES #: 0.1 K/UL
LYMPHOCYTES ABSOLUTE: 1.3 K/UL (ref 1.1–4.5)
LYMPHOCYTES RELATIVE PERCENT: 12.2 % (ref 20–40)
MCH RBC QN AUTO: 31.9 PG (ref 27–31)
MCHC RBC AUTO-ENTMCNC: 32.2 G/DL (ref 33–37)
MCV RBC AUTO: 98.9 FL (ref 80–94)
METHEMOGLOBIN ARTERIAL: 0.7 %
MONOCYTES ABSOLUTE: 0.6 K/UL (ref 0–0.9)
MONOCYTES RELATIVE PERCENT: 5.7 % (ref 0–10)
NEUTROPHILS ABSOLUTE: 8.5 K/UL (ref 1.5–7.5)
NEUTROPHILS RELATIVE PERCENT: 80.8 % (ref 50–65)
O2 CONTENT ARTERIAL: 11.9 ML/DL
O2 SAT, ARTERIAL: 87.9 %
O2 THERAPY: ABNORMAL
PCO2 ARTERIAL: 35 MMHG (ref 35–45)
PDW BLD-RTO: 12.3 % (ref 11.5–14.5)
PERFORMED ON: ABNORMAL
PH ARTERIAL: 7.43 (ref 7.35–7.45)
PLATELET # BLD: 138 K/UL (ref 130–400)
PMV BLD AUTO: 11.1 FL (ref 9.4–12.4)
PO2 ARTERIAL: 55 MMHG (ref 80–100)
POTASSIUM REFLEX MAGNESIUM: 3.7 MMOL/L (ref 3.5–5)
POTASSIUM, WHOLE BLOOD: 3.9
RBC # BLD: 2.79 M/UL (ref 4.7–6.1)
SODIUM BLD-SCNC: 135 MMOL/L (ref 136–145)
T4 FREE: 1.37 NG/DL (ref 0.93–1.7)
TSH SERPL DL<=0.05 MIU/L-ACNC: 0.59 UIU/ML (ref 0.27–4.2)
WBC # BLD: 10.5 K/UL (ref 4.8–10.8)

## 2021-02-16 PROCEDURE — 6360000002 HC RX W HCPCS: Performed by: INTERNAL MEDICINE

## 2021-02-16 PROCEDURE — 99223 1ST HOSP IP/OBS HIGH 75: CPT | Performed by: PSYCHIATRY & NEUROLOGY

## 2021-02-16 PROCEDURE — 84443 ASSAY THYROID STIM HORMONE: CPT

## 2021-02-16 PROCEDURE — 93010 ELECTROCARDIOGRAM REPORT: CPT | Performed by: INTERNAL MEDICINE

## 2021-02-16 PROCEDURE — 36415 COLL VENOUS BLD VENIPUNCTURE: CPT

## 2021-02-16 PROCEDURE — 6370000000 HC RX 637 (ALT 250 FOR IP): Performed by: PSYCHIATRY & NEUROLOGY

## 2021-02-16 PROCEDURE — 82140 ASSAY OF AMMONIA: CPT

## 2021-02-16 PROCEDURE — 97530 THERAPEUTIC ACTIVITIES: CPT

## 2021-02-16 PROCEDURE — 2700000000 HC OXYGEN THERAPY PER DAY

## 2021-02-16 PROCEDURE — 95816 EEG AWAKE AND DROWSY: CPT

## 2021-02-16 PROCEDURE — 80048 BASIC METABOLIC PNL TOTAL CA: CPT

## 2021-02-16 PROCEDURE — 82947 ASSAY GLUCOSE BLOOD QUANT: CPT

## 2021-02-16 PROCEDURE — 84132 ASSAY OF SERUM POTASSIUM: CPT

## 2021-02-16 PROCEDURE — 6360000002 HC RX W HCPCS: Performed by: ORTHOPAEDIC SURGERY

## 2021-02-16 PROCEDURE — 6370000000 HC RX 637 (ALT 250 FOR IP): Performed by: ORTHOPAEDIC SURGERY

## 2021-02-16 PROCEDURE — 6360000002 HC RX W HCPCS: Performed by: PSYCHIATRY & NEUROLOGY

## 2021-02-16 PROCEDURE — 2580000003 HC RX 258: Performed by: INTERNAL MEDICINE

## 2021-02-16 PROCEDURE — 36600 WITHDRAWAL OF ARTERIAL BLOOD: CPT

## 2021-02-16 PROCEDURE — 6370000000 HC RX 637 (ALT 250 FOR IP): Performed by: INTERNAL MEDICINE

## 2021-02-16 PROCEDURE — 84439 ASSAY OF FREE THYROXINE: CPT

## 2021-02-16 PROCEDURE — 1210000000 HC MED SURG R&B

## 2021-02-16 PROCEDURE — 82803 BLOOD GASES ANY COMBINATION: CPT

## 2021-02-16 PROCEDURE — 85025 COMPLETE CBC W/AUTO DIFF WBC: CPT

## 2021-02-16 RX ORDER — CYANOCOBALAMIN 1000 UG/ML
1000 INJECTION INTRAMUSCULAR; SUBCUTANEOUS
Status: DISCONTINUED | OUTPATIENT
Start: 2021-04-06 | End: 2021-02-22 | Stop reason: HOSPADM

## 2021-02-16 RX ORDER — FOLIC ACID 1 MG/1
1 TABLET ORAL DAILY
Status: DISCONTINUED | OUTPATIENT
Start: 2021-02-16 | End: 2021-02-22 | Stop reason: HOSPADM

## 2021-02-16 RX ORDER — CYANOCOBALAMIN 1000 UG/ML
1000 INJECTION INTRAMUSCULAR; SUBCUTANEOUS
Status: DISCONTINUED | OUTPATIENT
Start: 2021-02-27 | End: 2021-02-22 | Stop reason: HOSPADM

## 2021-02-16 RX ORDER — CYANOCOBALAMIN 1000 UG/ML
1000 INJECTION INTRAMUSCULAR; SUBCUTANEOUS DAILY
Status: COMPLETED | OUTPATIENT
Start: 2021-02-16 | End: 2021-02-20

## 2021-02-16 RX ORDER — LORAZEPAM 2 MG/ML
1 INJECTION INTRAMUSCULAR EVERY 4 HOURS PRN
Status: DISCONTINUED | OUTPATIENT
Start: 2021-02-16 | End: 2021-02-22 | Stop reason: HOSPADM

## 2021-02-16 RX ORDER — THIAMINE HYDROCHLORIDE 100 MG/ML
100 INJECTION, SOLUTION INTRAMUSCULAR; INTRAVENOUS DAILY
Status: DISCONTINUED | OUTPATIENT
Start: 2021-02-16 | End: 2021-02-22 | Stop reason: HOSPADM

## 2021-02-16 RX ORDER — MULTIVITAMIN WITH IRON
1 TABLET ORAL DAILY
Status: DISCONTINUED | OUTPATIENT
Start: 2021-02-16 | End: 2021-02-22 | Stop reason: HOSPADM

## 2021-02-16 RX ADMIN — LISINOPRIL 20 MG: 20 TABLET ORAL at 08:41

## 2021-02-16 RX ADMIN — LORAZEPAM 1 MG: 2 INJECTION INTRAMUSCULAR; INTRAVENOUS at 14:27

## 2021-02-16 RX ADMIN — THIAMINE HYDROCHLORIDE 100 MG: 100 INJECTION, SOLUTION INTRAMUSCULAR; INTRAVENOUS at 14:26

## 2021-02-16 RX ADMIN — LORAZEPAM 1 MG: 2 INJECTION INTRAMUSCULAR; INTRAVENOUS at 10:44

## 2021-02-16 RX ADMIN — VANCOMYCIN HYDROCHLORIDE 1000 MG: 10 INJECTION, POWDER, LYOPHILIZED, FOR SOLUTION INTRAVENOUS at 03:06

## 2021-02-16 RX ADMIN — SODIUM CHLORIDE, PRESERVATIVE FREE 2000 MG: 5 INJECTION INTRAVENOUS at 06:09

## 2021-02-16 RX ADMIN — THERA TABS 1 TABLET: TAB at 14:26

## 2021-02-16 RX ADMIN — ENOXAPARIN SODIUM 40 MG: 40 INJECTION SUBCUTANEOUS at 08:41

## 2021-02-16 RX ADMIN — DOCUSATE SODIUM AND SENNOSIDES 1 TABLET: 8.6; 5 TABLET, FILM COATED ORAL at 08:41

## 2021-02-16 RX ADMIN — OXYCODONE AND ACETAMINOPHEN 1 TABLET: 5; 325 TABLET ORAL at 03:06

## 2021-02-16 RX ADMIN — SODIUM CHLORIDE, PRESERVATIVE FREE 10 ML: 5 INJECTION INTRAVENOUS at 21:17

## 2021-02-16 RX ADMIN — DOCUSATE SODIUM AND SENNOSIDES 1 TABLET: 8.6; 5 TABLET, FILM COATED ORAL at 21:17

## 2021-02-16 RX ADMIN — FOLIC ACID 1 MG: 1 TABLET ORAL at 14:27

## 2021-02-16 RX ADMIN — LORAZEPAM 1 MG: 2 INJECTION INTRAMUSCULAR; INTRAVENOUS at 21:17

## 2021-02-16 RX ADMIN — CYANOCOBALAMIN 1000 MCG: 1000 INJECTION, SOLUTION INTRAMUSCULAR; SUBCUTANEOUS at 14:26

## 2021-02-16 RX ADMIN — HYDROCHLOROTHIAZIDE 25 MG: 25 TABLET ORAL at 08:41

## 2021-02-16 ASSESSMENT — PAIN DESCRIPTION - ORIENTATION: ORIENTATION: LEFT

## 2021-02-16 NOTE — CONSULTS
[facility] Neurology Consult        Patient:   Jose Corrales  MR#:    [id number]  Account Number:                   [id number]      Room:    [address] [place] [address]   YOB: 1945  Date of Progress Note: 2/16/2021  Time of Note                           12:30 PM  Attending Physician:  Lemuel Meraz MD  Consulting Physician:   Antonia Brito M.D.        [address]:  [facility]       HISTORY OF PRESENT ILLNESS:   This 67 y.o. male with a past medical history significant for HTN and smoking is seen for altered mental status following left femur fracture and repair post fall on 2/13, 3 days prior to consultation. There was no apparent head injury and he was alert, appropriate and conversant following the injury. The patient underwent cephalomedullary nailing on 2/13 of the left femur and did well post operatively. On 2/14 there is report that patient was becoming confused and agitated but then the am of 2/15 he was better. The patient became confused with some agitation. This am nursing notes patient was yelling out and seeing bugs crawling on ceiling. Wants to go home. He is on some post op antibiotics for possible infections. Urine analysis and CXR have been unremarkable. His blood cultures have been negative so far. He is on a low dose of Percocet as well. He denies drinking alcohol in 40 years. He does smoke. His B12 was essentially undetectable (lower than the threshold reportable). REVIEW OF SYSTEMS:  Constitutional - No fever or chills. No diaphoresis or significant fatigue. HENT -  No tinnitus or significant hearing loss. Eyes - no sudden vision change or eye pain  Respiratory - no significant shortness of breath or cough  Cardiovascular - no chest pain No palpitations or significant leg swelling  Gastrointestinal - no abdominal swelling or pain. Genitourinary - No difficulty urinating, dysuria  Musculoskeletal - no back pain or myalgia. Skin - no color change or rash  Neurologic - No seizures.   No lateralizing weakness. Hematologic - no easy bruising or excessive bleeding. Psychiatric - no severe anxiety or nervousness. All other review of systems are negative.       Past Medical History:      Diagnosis Date    Cerebral artery occlusion with cerebral infarction ([facility] [address]     tia    Hypertension        Past Surgical History:      Procedure Laterality Date    FEMUR FRACTURE SURGERY Left 2/13/2021    FEMUR IM NAIL GILDARDO INSERTION performed by Shila Suggs MD at [address] [place] [address]       Medications in Hospital:      Current Facility-Administered Medications:     LORazepam (ATIVAN) injection 1 mg, 1 mg, Intravenous, Q4H PRN, Nadia Vang MD, 1 mg at 02/16/21 1044    oxyCODONE-acetaminophen (PERCOCET) 5-325 MG per tablet 1 tablet, 1 tablet, Oral, Q6H PRN, Koki Clark MD, 1 tablet at 02/16/21 0306    vancomycin (VANCOCIN) 1000 mg in dextrose 5% 250 mL IVPB, 1,000 mg, Intravenous, Q12H, Koki Clark MD, Stopped at 02/16/21 0406    nicotine (NICODERM CQ) 21 MG/24HR 1 patch, 1 patch, Transdermal, Daily, Koki Clark MD, 1 patch at 02/16/21 0842    insulin lispro (HUMALOG) injection vial 0-6 Units, 0-6 Units, Subcutaneous, TID WC, Koki Clark MD    insulin lispro (HUMALOG) injection vial 0-3 Units, 0-3 Units, Subcutaneous, Nightly, Koki Clark MD    glucose (GLUTOSE) 40 % oral gel 15 g, 15 g, Oral, PRN, Koki Clark MD    dextrose 50 % IV solution, 12.5 g, Intravenous, PRN, Koki Clark MD    glucagon (rDNA) injection 1 mg, 1 mg, Intramuscular, PRN, Koki Clark MD    dextrose 5 % solution, 100 mL/hr, Intravenous, PRN, Koki Clark MD    0.9 % sodium chloride infusion, , Intravenous, Continuous, Koki Clark MD, Last Rate: 125 mL/hr at 02/15/21 1414, New Bag at 02/15/21 1414    ceFEPIme (MAXIPIME) 2,000 mg in sodium chloride (PF) 20 mL IV syringe, 2,000 mg, Intravenous, Q12H, Koki Clark MD, 2,000 mg at 02/16/21 0609    vancomycin (VANCOCIN) intermittent dosing (placeholder), , Other, RX Placeholder, Nannette Murrell MD    ondansetron [facility]) injection 4 mg, 4 mg, Intravenous, Once, Nikos Francis MD    sodium chloride flush 0.9 % injection 10 mL, 10 mL, Intravenous, 2 times per day, Nannette Murrell MD, 10 mL at 02/15/21 4479    sodium chloride flush 0.9 % injection 10 mL, 10 mL, Intravenous, PRN, Nannette Murrell MD    potassium chloride (KLOR-CON M) extended release tablet 40 mEq, 40 mEq, Oral, PRN **OR** potassium bicarb-citric acid (EFFER-K) effervescent tablet 40 mEq, 40 mEq, Oral, PRN **OR** potassium chloride 10 mEq/100 mL IVPB (Peripheral Line), 10 mEq, Intravenous, PRN, Nannette Murrell MD    promethazine (PHENERGAN) tablet 12.5 mg, 12.5 mg, Oral, Q6H PRN, 12.5 mg at 02/15/21 2116 **OR** ondansetron (ZOFRAN) injection 4 mg, 4 mg, Intravenous, Q6H PRN, Nannette Murrell MD    magnesium hydroxide (MILK OF MAGNESIA) 400 MG/5ML suspension 30 mL, 30 mL, Oral, Daily PRN, Nannette Murrell MD    acetaminophen (TYLENOL) tablet 650 mg, 650 mg, Oral, Q6H PRN **OR** acetaminophen (TYLENOL) suppository 650 mg, 650 mg, Rectal, Q6H PRN, Nannette Murrell MD    sennosides-docusate sodium (SENOKOT-S) 8.6-50 MG tablet 1 tablet, 1 tablet, Oral, BID, Opal Salguero MD, 1 tablet at 02/16/21 0841    enoxaparin (LOVENOX) injection 40 mg, 40 mg, Subcutaneous, Daily, Opal Salguero MD, 40 mg at 02/16/21 0841    lisinopril (PRINIVIL;ZESTRIL) tablet 20 mg, 20 mg, Oral, Daily, 20 mg at 02/16/21 0841 **AND** hydroCHLOROthiazide (HYDRODIURIL) tablet 25 mg, 25 mg, Oral, Daily, Nannette Murrell MD, 25 mg at 02/16/21 0718    Allergies:  Patient has no known allergies. Social History:   TOBACCO:   reports that he has been smoking. He has been smoking about 1.00 pack per day. He has never used smokeless tobacco.  ETOH:   reports no history of alcohol use. Family History:   History reviewed.  No pertinent family 02/14/21  1657 02/15/21  0529 02/16/21  0214   WBC 16.1* 11.6* 10.5   HGB 11.1* 9.4* 8.9*    129* 138       BMP:    Recent Labs     02/14/21 1657 02/15/21  0529 02/16/21  0214   * 136 135*   K 4.5 4.1 3.7   CL 94* 102 100   CO2 24 27 27   BUN 31* 29* 21   CREATININE 1.3* 1.0 0.8   GLUCOSE 145* 112* 93       Hepatic:   Recent Labs     02/14/21 1657   AST 18   ALT 8   BILITOT 0.6   ALKPHOS 49       Lipids: No results for input(s): CHOL, HDL in the last 72 hours. Invalid input(s): LDLCALCU    INR: No results for input(s): INR in the last 72 hours. Assessment and Plan     Altered mental status-post op ? Sundowning     Exam  Patient is oriented but agitated insisting he wants to go home  Does not want to cooperate with exam but except for left hip, grossly non focal  CT head no acute changes-chronic small vessel disease/atrophy with associated ventricular prominence  Unclear if the patient has some dementia at baseline      B12 essentially undetectable  Start B12 injections  Place IV thiamine/MVA and Folic acid   Check TSH/ammonia    Check EEG    Denies any history of ETOH use    Smoker-check Abg    Would stop antibiotics as soon as possible as this could be contributing to confusion     HTN-on meds monitor     Post op left femur fracture and repair- minimize pain meds as able    DVT prophylaxis-Lovenox    PT/OT    To low level for acute inpatient rehab      Please feel free to call with any questions   [phone] (cell phone)    Dr Pino Parkinson certified in Neurology  Board Certified in [place] [address] Neurophysiology  Fellowship Trained in nprogressiones 6199 Neurophysiology    EMR Dragon/transcription disclaimer:Significant part of this  encounter note is electronic transcription/translation of spoken language to printed text. The electronic translation of spoken language may be erroneous, or at times, nonsensical words or phrases may be inadvertently transcribed.  Although I have reviewed the note for such errors, some may still exist.

## 2021-02-16 NOTE — PROGRESS NOTES
Patient confused, yelling out, sees bugs crawling on the ceiling. Patient calling family members. Speaking on the phone with patient's wife at this time.

## 2021-02-16 NOTE — CARE COORDINATION
Pt is too low level for 8th floor rehab. SW will reach out to Pt family regarding snf options.    Electronically signed by Valeriano Reynolds on 2/16/2021 at 1:19 PM

## 2021-02-16 NOTE — PROGRESS NOTES
Orthopedic Surgery Progress Note    Tej Silva  2/16/2021      Subjective:     Discussed with nursing this afternoon, patient has become more confused including hallucinations and even combative at times overnight into today. No focal motor deficits to suggest neurologic event. During discussion with nursing staff, patient repeatedly asked to have help getting out of bed. He was reminded multiple times that due to his balance we did not feel safe helping him get out of bed at this time. He reports ongoing, reasonably controlled pain to his hip when directly asked. He denies any active chest pain or shortness of breath. Objective:     Patient Vitals for the past 24 hrs:   BP Temp Temp src Pulse Resp SpO2   02/16/21 1059 (!) 148/92 98.7 °F (37.1 °C) Temporal 97 18 95 %   02/16/21 0726 (!) 154/82 98.2 °F (36.8 °C) Temporal 99 18 96 %   02/16/21 0304 (!) 163/83 98 °F (36.7 °C) Temporal 93 18 90 %   02/15/21 2210 (!) 142/65 99.1 °F (37.3 °C) Temporal 100 18 90 %   02/15/21 1833 (!) 155/85 99.3 °F (37.4 °C) Temporal 106 16 90 %   02/15/21 1401 -- -- -- 105 18 91 %     General: Awake, no acute distress, oriented to person only  Respiratory: Nonlabored respirations  Cardiovascular: Regular rate  Left lower extremity: Dressings are in place, clean, dry and intact.  No significant edema, erythema or ecchymosis.  EHL, FHL, tibialis anterior, gastrocsoleus complex motor intact.  Gross sensation is intact to the left foot.  Left foot is warm and perfused.       Data Review:  Recent Labs     02/15/21  0529 02/16/21 0214   HGB 9.4* 8.9*     Recent Labs     02/16/21 0214   *   K 3.7   CREATININE 0.8       Assessment:     68 y/o M POD#3 s/p L CMN, acute altered mental status significant medical history for hypertension and chronic tobacco use       Plan: Altered mental status: Neurology is evaluating. Await EEG results. No recent history of alcohol use.   Pain: Controlled, continue current regimen, minimize opioids as able due to altered mental status  Acute post-op blood loss anemia: H/H 8.9/27.6, hypertensive, no plans for transfusion  Leukocytosis: Resolved, DC antibiotics  DVT prophylaxis: PAS boots, lovenox, plans to transition to 325mg ASA as outpatient for one month  Physical therapy/Occupational therapy  Activity: Weight bearing as tolerated LLE, ice/elevate  Dressing: Plan to keep in place for 2 weeks unless they lose adhesion or become saturated, then apply new Mepilex dressings  Disposition: Follow-up neurology recommendations.          Electronically signed by Indira Montenegro MD on 2/16/2021 at 1:53 PM

## 2021-02-16 NOTE — PROGRESS NOTES
[facility] Hospitalists Progress Note    Patient:  Juan Francisco Cedeño  YOB: 1945  Date of Service: 2/16/2021  MRN: [id number]   Acct: [de-identified]   Primary Care Physician: Nicola El DO  Advance Directive: Full Code  Admit Date: 2/13/2021       Hospital Day: 3      CHIEF COMPLAINT:    Chief Complaint   Patient presents with    Fall    Hip Injury       2/16/2021 8:46 AM  Subjective / Interval History:   02/16/2021  Patient seen and examined this AM.  Continues to be agitated, confused and with intermittent combativeness. No other acute changes or acute overnight event reported. Patient endorses fairly controlled pain. 02/15/2021  Patient with reported acute change in mental status with some agitation and delirium yesterday. Patient seen and examined this AM.  No new complaints. Mental status improved. Laying comfortably in bed in no acute distress. No other acute changes or acute overnight event reported. Left hip pain fairly controlled. 02/14/2021  Patient seen and examined this AM.  Doing well. No new complaints. Left hip pain fairly controlled. Laying comfortably in bed in no acute distress. No acute changes or acute overnight event reported. Status post cephalomedullary nailing of left hip yesterday. Doing well postop. Review of Systems:   Review of Systems   Unable to perform ROS: Mental status change         DIET CARDIAC;     Intake/Output Summary (Last 24 hours) at 2/16/2021 0846  Last data filed at 2/16/2021 0304  Gross per 24 hour   Intake 931 ml   Output 925 ml   Net 6 ml       Medications:   dextrose      sodium chloride 125 mL/hr at 02/15/21 1414     Current Facility-Administered Medications   Medication Dose Route Frequency Provider Last Rate Last Admin    oxyCODONE-acetaminophen (PERCOCET) 5-325 MG per tablet 1 tablet  1 tablet Oral Q6H PRN Koki Clark MD   1 tablet at 02/16/21 0306    vancomycin (VANCOCIN) 1000 mg in dextrose 5% 250 mL IVPB 1,000 mg Intravenous Q12H Jyoti Nicole MD   Stopped at 02/16/21 0406    nicotine (NICODERM CQ) 21 MG/24HR 1 patch  1 patch Transdermal Daily Jyoti Nicole MD   1 patch at 02/16/21 0842    insulin lispro (HUMALOG) injection vial 0-6 Units  0-6 Units Subcutaneous TID WC Jyoti Nicole MD        insulin lispro (HUMALOG) injection vial 0-3 Units  0-3 Units Subcutaneous Nightly Jyoti Nicole MD        glucose (GLUTOSE) 40 % oral gel 15 g  15 g Oral PRN Jyoti Nicole MD        dextrose 50 % IV solution  12.5 g Intravenous PRN Jyoti Nicole MD        glucagon (rDNA) injection 1 mg  1 mg Intramuscular PRN Jyoti Nicole MD        dextrose 5 % solution  100 mL/hr Intravenous PRN Jyoti Nicole MD        0.9 % sodium chloride infusion   Intravenous Continuous Jyoti Nicole  mL/hr at 02/15/21 1414 New Bag at 02/15/21 1414    ceFEPIme (MAXIPIME) 2,000 mg in sodium chloride (PF) 20 mL IV syringe  2,000 mg Intravenous Q12H Jyoti Nicole MD   2,000 mg at 02/16/21 0116    vancomycin (VANCOCIN) intermittent dosing (placeholder)   Other RX Placeholder Jyoti Nicole MD        ondansetron [facility]) injection 4 mg  4 mg Intravenous Once Jayden MD Teresa        sodium chloride flush 0.9 % injection 10 mL  10 mL Intravenous 2 times per day Jyoti Nicole MD   10 mL at 02/15/21 0918    sodium chloride flush 0.9 % injection 10 mL  10 mL Intravenous PRN Jyoti Nicole MD        potassium chloride (KLOR-CON M) extended release tablet 40 mEq  40 mEq Oral PRN Jyoti Nicole MD        Or    potassium bicarb-citric acid (EFFER-K) effervescent tablet 40 mEq  40 mEq Oral PRN Jyoti Nicole MD        Or    potassium chloride 10 mEq/100 mL IVPB (Peripheral Line)  10 mEq Intravenous PRN Jyoti Nicole MD        promethazine (PHENERGAN) tablet 12.5 mg  12.5 mg Oral Q6H PRN Jyoti Nicole MD   12.5 mg at 02/15/21 2116    Or    ondansetron (ZOFRAN) injection 4 mg  4 mg Intravenous Q6H PRN Selena Perez MD        magnesium hydroxide (MILK OF MAGNESIA) 400 MG/5ML suspension 30 mL  30 mL Oral Daily PRN Selena Perez MD        acetaminophen (TYLENOL) tablet 650 mg  650 mg Oral Q6H PRN Selena Perez MD        Or   [facility] acetaminophen (TYLENOL) suppository 650 mg  650 mg Rectal Q6H PRN Selena Perez MD        sennosides-docusate sodium (SENOKOT-S) 8.6-50 MG tablet 1 tablet  1 tablet Oral BID Madalynn Simmonds, MD   1 tablet at 02/16/21 0841    enoxaparin (LOVENOX) injection 40 mg  40 mg Subcutaneous Daily Madalynn Simmonds, MD   40 mg at 02/16/21 0841    lisinopril (PRINIVIL;ZESTRIL) tablet 20 mg  20 mg Oral Daily Selena Perez MD   20 mg at 02/16/21 0841    And    hydroCHLOROthiazide (HYDRODIURIL) tablet 25 mg  25 mg Oral Daily Selena Perez MD   25 mg at 02/16/21 0841         dextrose      sodium chloride 125 mL/hr at 02/15/21 1414      vancomycin  1,000 mg Intravenous Q12H    nicotine  1 patch Transdermal Daily    insulin lispro  0-6 Units Subcutaneous TID WC    insulin lispro  0-3 Units Subcutaneous Nightly    cefepime  2,000 mg Intravenous Q12H    vancomycin (VANCOCIN) intermittent dosing (placeholder)   Other RX Placeholder    ondansetron  4 mg Intravenous Once    sodium chloride flush  10 mL Intravenous 2 times per day    sennosides-docusate sodium  1 tablet Oral BID    enoxaparin  40 mg Subcutaneous Daily    lisinopril  20 mg Oral Daily    And    hydroCHLOROthiazide  25 mg Oral Daily     oxyCODONE-acetaminophen, glucose, dextrose, glucagon (rDNA), dextrose, sodium chloride flush, potassium chloride **OR** potassium alternative oral replacement **OR** potassium chloride, promethazine **OR** ondansetron, magnesium hydroxide, acetaminophen **OR** acetaminophen  DIET CARDIAC;       Labs:   CBC with DIFF:  Recent Labs     02/14/21  1657 02/15/21  0529 02/16/21  0214   WBC 16.1* 11.6* 10.5   RBC 3.46* 2.98* 2.79*   HGB 11.1* 9.4* 8.9*   HCT 34.2* 29.6* 27.6*   MCV 98.8* 99.3* 98.9*   MCH 32.1* 31.5* 31.9*   MCHC 32.5* 31.8* 32.2*   RDW 12.3 12.4 12.3    129* 138   MPV 10.7 11.4 11.1   NEUTOPHILPCT 84.9* 76.5* 80.8*   LYMPHOPCT 7.5* 15.7* 12.2*   MONOPCT 7.0 7.0 5.7   EOSRELPCT 0.1 0.1 0.7   BASOPCT 0.1 0.2 0.1   NEUTROABS 13.7* 8.9* 8.5*   LYMPHSABS 1.2 1.8 1.3   MONOSABS 1.10* 0.80 0.60   EOSABS 0.00 0.00 0.10   BASOSABS 0.00 0.00 0.00       CMP/BMP:  Recent Labs     02/13/21  1144 02/13/21  1144 02/14/21  1657 02/15/21  0529 02/16/21  0214      < > 130* 136 135*   K 3.8   < > 4.5 4.1 3.7      < > 94* 102 100   CO2 30*   < > 24 27 27   ANIONGAP 4*   < > 12 7 8   GLUCOSE 128*   < > 145* 112* 93   BUN 19   < > 31* 29* 21   CREATININE 0.8   < > 1.3* 1.0 0.8   LABGLOM >60   < > 54* >60 >60   CALCIUM 8.9   < > 8.8 8.1* 8.2*   PROT 7.3  --  6.7  --   --    LABALBU 3.8  --  3.5  --   --    BILITOT 0.3  --  0.6  --   --    ALKPHOS 58  --  49  --   --    ALT 7  --  8  --   --    AST 13  --  18  --   --     < > = values in this interval not displayed. CRP:  No results for input(s): CRP in the last 72 hours. Sed Rate:  No results for input(s): SEDRATE in the last 72 hours. HgBA1c:  No components found for: HGBA1C  FLP:  No results found for: TRIG, HDL, LDLCALC, LDLDIRECT, LABVLDL  TSH:  No results found for: TSH  Troponin T: No results for input(s): TROPONINI in the last 72 hours. Pro-BNP: No results for input(s): BNP in the last 72 hours. INR:   Recent Labs     02/13/21  1144   INR 1.07     ABGs: No results found for: PHART, PO2ART, BEA7OVL  UA:  Recent Labs     02/15/21  1107   COLORU YELLOW   PHUR 5.0   CLARITYU Clear   SPECGRAV 1.018   LEUKOCYTESUR Negative   UROBILINOGEN 0.2   BILIRUBINUR Negative   BLOODU Negative   GLUCOSEU Negative         Culture Results:    No results for input(s): CXSURG in the last 720 hours.     Blood Culture Recent:   Recent Labs     02/14/21  1632   BC 1:39 PM    Xr Chest Portable    Result Date: 2/13/2021  EXAMINATION: XR CHEST PORTABLE 2/13/2021 12:16 PM HISTORY: Fall, pain. Report: A portable frontal view of the chest was obtained. COMPARISON: There are no correlative imaging studies for comparison. The lungs are clear, well expanded. Heart size is normal. There is mild ectasia of the thoracic aorta. No pneumothorax or significant pleural effusion is identified. The osseous structures and upper abdomen are unremarkable. No acute cardiopulmonary abnormality. Signed by Dr Deirdre Beckham on 2/13/2021 12:18 PM    Xr Hip 2-3 Vw W Pelvis Left    Result Date: 2/13/2021  EXAMINATION: XR HIP 2-3 VW W PELVIS LEFT 2/13/2021 12:14 PM HISTORY: Fall, left hip pain. Report: An AP view the pelvis was obtained as well as AP and crosstable lateral views of the left hip. COMPARISON: There are no correlative imaging studies for comparison. The right hip is unremarkable. There is an acute  intratrochanteric left hip fracture oriented vertically, with mild lateral displacement, no significant angulation or involvement of the femoral head. The other pelvic osseous structures appear unremarkable. Acute mildly displaced closed intratrochanteric left hip fracture as described. Signed by Dr Deirdre Beckham on 2/13/2021 12:16 PM        Objective:   Vitals:   BP (!) 154/82   Pulse 99   Temp 98.2 °F (36.8 °C) (Temporal)   Resp 18   Ht 6' (1.829 m)   Wt 195 lb (88.5 kg)   SpO2 96%   BMI 26.45 kg/m²       Patient Vitals for the past 24 hrs:   BP Temp Temp src Pulse Resp SpO2   02/16/21 0726 (!) 154/82 98.2 °F (36.8 °C) Temporal 99 18 96 %   02/16/21 0304 (!) 163/83 98 °F (36.7 °C) Temporal 93 18 90 %   02/15/21 2210 (!) 142/65 99.1 °F (37.3 °C) Temporal 100 18 90 %   02/15/21 1833 (!) 155/85 99.3 °F (37.4 °C) Temporal 106 16 90 %   02/15/21 1401 -- -- -- 105 18 91 %       24HR INTAKE/OUTPUT:      Intake/Output Summary (Last 24 hours) at 2/16/2021 1330  Last data filed at 2/16/2021 0304  Gross per 24 hour   Intake 931 ml   Output 925 ml   Net 6 ml       Physical Exam  Vitals signs and nursing note reviewed. Constitutional:       General: He is not in acute distress. Appearance: Normal appearance. He is not ill-appearing, toxic-appearing or diaphoretic. HENT:      Head: Normocephalic and atraumatic. Right Ear: External ear normal.      Left Ear: External ear normal.      Nose: Nose normal. No congestion or rhinorrhea. Mouth/Throat:      Mouth: Mucous membranes are moist.      Pharynx: Oropharynx is clear. No oropharyngeal exudate or posterior oropharyngeal erythema. Eyes:      General: No scleral icterus. Right eye: No discharge. Left eye: No discharge. Extraocular Movements: Extraocular movements intact. Conjunctiva/sclera: Conjunctivae normal.   Neck:      Musculoskeletal: Normal range of motion and neck supple. No neck rigidity or muscular tenderness. Vascular: No carotid bruit. Cardiovascular:      Rate and Rhythm: Normal rate and regular rhythm. Pulses: Normal pulses. Heart sounds: Normal heart sounds. No murmur. No friction rub. No gallop. Pulmonary:      Effort: Pulmonary effort is normal. No respiratory distress. Breath sounds: Normal breath sounds. No stridor. No wheezing, rhonchi or rales. Chest:      Chest wall: No tenderness. Abdominal:      General: Bowel sounds are normal. There is no distension. Palpations: Abdomen is soft. Tenderness: There is no abdominal tenderness. There is no guarding or rebound. Musculoskeletal:         General: No swelling, tenderness, deformity or signs of injury. Right lower leg: No edema. Left lower leg: No edema. Comments: Decreased ROM in left hip due to pain. Intact ROM in bilateral upper and right lower extremities. Skin:     General: Skin is warm and dry. Capillary Refill: Capillary refill takes less than 2 seconds. empiric antibiotics (vancomycin and cefepime)  · Blood cultures pending  · Pending urine cultures  · Consider Neurology consult for persistent mental status change. Acute mildly displaced closed intratrochanteric left hip  Fracture  · Status post mechanical fall from slipping on ice  · Orthopedics following  · Status post cephalomedullary nailing of left intratrochanteric hip fracture. (02/13/2021)  · Continue symptomatic and supportive management including pain management as needed. · PT OT       History of hypertension  · Continue home regimen  · Lisinopril-HCTZ 20-25 mg p.o. daily     Hyperglycemia  · No documented history of diabetes  · Hemoglobin A1c level: 5.6%  · However patient with persistent hyperglycemia  · Glucose of 161 (02/1/2021)  · Insulin as part on low-dose sliding scale  · Continue monitoring POC glucose           Continue management of other chronic medical conditions - see above and orders. Advance Directive: Full Code    DIET CARDIAC;         Consults Made:   IP CONSULT TO ORTHOPEDIC SURGERY  IP CONSULT TO SOCIAL WORK  PHARMACY TO DOSE VANCOMYCIN  IP CONSULT TO REHAB/TCU ADMISSION COORDINATOR  IP CONSULT TO NEUROLOGY    DVT prophylaxis: Enoxaparin      Discharge planning: tbd    Time Spent is 25 mins in the examination, evaluation, counseling and review of medications, assessment and plan.      Electronically signed by   Yefri Jean MD, MPH,   Internal Medicine Hospitalist   2/16/2021 8:46 AM

## 2021-02-16 NOTE — PROGRESS NOTES
pH, Arterial 7.430  7.350 - 7.450 Final 02/16/2021  2:14 PM [address] [facility]   pCO2, Arterial 35.0  35.0 - 45.0 mmHg Final 02/16/2021  2:14 PM [facility]   pO2, Arterial 55. 0Low   80.0 - 100.0 mmHg Final 02/16/2021  2:14 PM [address] [facility]   HCO3, Arterial 23.2  22.0 - 26.0 mmol/L Final 02/16/2021  2:14 PM [facility] Excess, Arterial -0.8  -2.0 - 2.0 mmol/L Final 02/16/2021  2:14 PM [address] [facility]   Hemoglobin, Art, Extended 9.6Low   14.0 - 18.0 g/dL Final 02/16/2021  2:14 PM [address] [facility]   O2 Sat, Arterial 87. 9Low Panic   >92 % Final 02/16/2021  2:14 PM [address] [place] [address] Lab   Carboxyhgb, Arterial 2.5  0.0 - 5.0 % Final 02/16/2021  2:14 PM [facility]        0.0-1.5   (Smokers 1.5-5.0)    Methemoglobin, Arterial 0.7  <1.5 % Final 02/16/2021  2:14 PM [address] [facility]   O2 Content, Arterial 11.9  Not Established mL/dL Final 02/16/2021  2:14 PM [address] [facility]     Patient on room air. ABG's drawn from RR, AT+.

## 2021-02-16 NOTE — PROGRESS NOTES
Occupational Therapy  Facility/Department: [facility] SURG SERVICES  Daily Treatment Note  NAME: Marck Damon  : 1945  MRN: [id number]    Date of Service: 2021    Discharge Recommendations:  Patient would benefit from continued therapy after discharge       Assessment   Performance deficits / Impairments: Decreased functional mobility ; Decreased ADL status; Decreased strength;Decreased endurance  Assessment: Pt required max assist x 2 for sitting and standing balance due to severe lean to L side (fx hip side). Pt very confused and seemed worse than yesterday mentally and physically. Pt was soaked from urine, linens also soaked. Pt and linen changed. Pt stood 3x's and unable to take any steps each time. Notified nursing of pt leaning to L side and increased confusion. Will continue to follow. Treatment Diagnosis: Left IT hip fx s/p cephalomedullary nail  Prognosis: Good  REQUIRES OT FOLLOW UP: Yes  Activity Tolerance  Activity Tolerance: Treatment limited secondary to decreased cognition         Patient Diagnosis(es): The primary encounter diagnosis was Closed fracture of left hip, initial encounter ([facility] [address] Diagnoses of Fall due to slipping on ice or snow, initial encounter and Essential hypertension were also pertinent to this visit. has a past medical history of Cerebral artery occlusion with cerebral infarction ([address] and Hypertension. has a past surgical history that includes Femur fracture surgery (Left, 2021).     Restrictions  Restrictions/Precautions  Restrictions/Precautions: Fall Risk, Weight Bearing  Required Braces or Orthoses?: No  Lower Extremity Weight Bearing Restrictions  Left Lower Extremity Weight Bearing: Weight Bearing As Tolerated  Subjective   General  Chart Reviewed: Yes  Patient assessed for rehabilitation services?: Yes  Response to previous treatment: Patient with no complaints from previous session  Family / Caregiver Present: No  Vital Signs  Patient Currently in Pain: Denies   Orientation     Objective             Balance  Sitting Balance: Moderate assistance(Pt leaning severely to left side requiring mod to max assist to maintain sitting balance.)  Standing Balance: Dependent/Total(Max to dependent due to leaning to L side.)  Bed mobility  Supine to Sit: Moderate assistance;2 Person assistance  Sit to Supine: 2 Person assistance;Maximum assistance  Scooting: Dependent/Total;2 Person assistance  Transfers  Stand Step Transfers: Unable to assess  Sit to stand: Maximum assistance;2 Person assistance;Dependent/Total  Stand to sit: Maximum assistance;2 Person assistance                                                                 Plan   Plan  Times per week: 3-5 days/wk.   G-Code     OutComes Score                                                  AM-PAC Score             Goals  Short term goals  Short term goal 1: Min A for dressing  Short term goal 2: Min A for toileting  Short term goal 3: Min A for transfers  Long term goals  Long term goal 1: Upgrade as tolerated       Therapy Time   Individual Concurrent Group Co-treatment   Time In           Time Out           Minutes                   STEFANIE Yen

## 2021-02-16 NOTE — PROGRESS NOTES
Physical Therapy  Name: Jean Minor  MRN:  [id number]  Date of service:  2/16/2021 02/16/21 1121   Restrictions/Precautions   Restrictions/Precautions Fall Risk;Weight Bearing   Required Braces or Orthoses? No   Lower Extremity Weight Bearing Restrictions   Left Lower Extremity Weight Bearing Weight Bearing As Tolerated   General   Chart Reviewed Yes   Subjective   Subjective Pt in bed, appears very confused and keeps saying \"they are coming to get me I'm not going anywhere\" among other non-sensical things. Denies any pain and says he can get up and go to work. Pain Screening   Patient Currently in Pain Denies   Intervention List Patient able to continue with treatment   Bed Mobility   Supine to Sit Moderate assistance  (x2)   Sit to Supine Maximal assistance  (x2)   Scooting Dependent/Total;2 Person assistance   Comment Pt has severe L lateral lean once in sitting, requires mod assist to maintain sitting balance at EOB   Transfers   Sit to Stand Maximum Assistance;Dependent/Total;2 Person Assistance   Stand to sit Maximum Assistance;2 Person Assistance   Comment Pt cont to have severe L lateral lean once in standing, unable to get COG over AMY, dependent on therapist to hold him up stright. Attempt x3   Short term goals   Time Frame for Short term goals 2 wks   Short term goal 1 supine to sit indep   Short term goal 2 sit to stand indep   Short term goal 3 amb. 100' with RW SBA   Short term goal 4 up/down 3 stairs SBA   Conditions Requiring Skilled Therapeutic Intervention   Body structures, Functions, Activity limitations Decreased functional mobility ; Decreased ROM; Decreased strength;Decreased safe awareness;Decreased cognition;Decreased endurance;Decreased posture; Increased pain;Decreased balance;Decreased coordination   Assessment Pt requiring more assist for all bed mobility and TF this tx.  Exhibits severe L lateral lean in sitting and in standing, dependent on therapist to hold him upright once in standing. Attempt to stand to pivot to recliner x3 but pt unable to stand upright to take steps, so returned to sitting EOB. Pt unable to clear hips from bed to scoot toward [facility], so returned to supine and scooted up by therapists, positioned for comfort with all needs in reach. Activity Tolerance   Activity Tolerance Patient limited by cognitive status; Patient limited by endurance   Safety Devices   Type of devices Bed alarm in place;Call light within reach;Gait belt;Left in bed         Electronically signed by Georgie Hahn PTA on 2/16/2021 at 11:31 AM

## 2021-02-17 LAB
ANION GAP SERPL CALCULATED.3IONS-SCNC: 7 MMOL/L (ref 7–19)
BASOPHILS ABSOLUTE: 0 K/UL (ref 0–0.2)
BASOPHILS RELATIVE PERCENT: 0.1 % (ref 0–1)
BUN BLDV-MCNC: 20 MG/DL (ref 8–23)
CALCIUM SERPL-MCNC: 8.4 MG/DL (ref 8.8–10.2)
CHLORIDE BLD-SCNC: 98 MMOL/L (ref 98–111)
CO2: 29 MMOL/L (ref 22–29)
CREAT SERPL-MCNC: 0.7 MG/DL (ref 0.5–1.2)
EOSINOPHILS ABSOLUTE: 0.1 K/UL (ref 0–0.6)
EOSINOPHILS RELATIVE PERCENT: 1 % (ref 0–5)
GFR AFRICAN AMERICAN: >59
GFR NON-AFRICAN AMERICAN: >60
GLUCOSE BLD-MCNC: 106 MG/DL (ref 70–99)
GLUCOSE BLD-MCNC: 108 MG/DL (ref 74–109)
GLUCOSE BLD-MCNC: 132 MG/DL (ref 70–99)
GLUCOSE BLD-MCNC: 135 MG/DL (ref 70–99)
HCT VFR BLD CALC: 28.7 % (ref 42–52)
HEMOGLOBIN: 8.9 G/DL (ref 14–18)
IMMATURE GRANULOCYTES #: 0.1 K/UL
LYMPHOCYTES ABSOLUTE: 1.1 K/UL (ref 1.1–4.5)
LYMPHOCYTES RELATIVE PERCENT: 12.6 % (ref 20–40)
MCH RBC QN AUTO: 31.8 PG (ref 27–31)
MCHC RBC AUTO-ENTMCNC: 31 G/DL (ref 33–37)
MCV RBC AUTO: 102.5 FL (ref 80–94)
MONOCYTES ABSOLUTE: 0.7 K/UL (ref 0–0.9)
MONOCYTES RELATIVE PERCENT: 7.7 % (ref 0–10)
MRSA CULTURE ONLY: NORMAL
NEUTROPHILS ABSOLUTE: 6.7 K/UL (ref 1.5–7.5)
NEUTROPHILS RELATIVE PERCENT: 78 % (ref 50–65)
PDW BLD-RTO: 12.1 % (ref 11.5–14.5)
PERFORMED ON: ABNORMAL
PLATELET # BLD: 150 K/UL (ref 130–400)
PMV BLD AUTO: 11 FL (ref 9.4–12.4)
POTASSIUM REFLEX MAGNESIUM: 4 MMOL/L (ref 3.5–5)
RBC # BLD: 2.8 M/UL (ref 4.7–6.1)
SODIUM BLD-SCNC: 134 MMOL/L (ref 136–145)
URINE CULTURE, ROUTINE: NORMAL
WBC # BLD: 8.6 K/UL (ref 4.8–10.8)

## 2021-02-17 PROCEDURE — 6370000000 HC RX 637 (ALT 250 FOR IP): Performed by: INTERNAL MEDICINE

## 2021-02-17 PROCEDURE — 95816 EEG AWAKE AND DROWSY: CPT | Performed by: PSYCHIATRY & NEUROLOGY

## 2021-02-17 PROCEDURE — 2580000003 HC RX 258: Performed by: INTERNAL MEDICINE

## 2021-02-17 PROCEDURE — 36415 COLL VENOUS BLD VENIPUNCTURE: CPT

## 2021-02-17 PROCEDURE — 6370000000 HC RX 637 (ALT 250 FOR IP): Performed by: ORTHOPAEDIC SURGERY

## 2021-02-17 PROCEDURE — 82947 ASSAY GLUCOSE BLOOD QUANT: CPT

## 2021-02-17 PROCEDURE — 85025 COMPLETE CBC W/AUTO DIFF WBC: CPT

## 2021-02-17 PROCEDURE — 6360000002 HC RX W HCPCS: Performed by: PSYCHIATRY & NEUROLOGY

## 2021-02-17 PROCEDURE — 6360000002 HC RX W HCPCS: Performed by: ORTHOPAEDIC SURGERY

## 2021-02-17 PROCEDURE — 6370000000 HC RX 637 (ALT 250 FOR IP): Performed by: PSYCHIATRY & NEUROLOGY

## 2021-02-17 PROCEDURE — 99233 SBSQ HOSP IP/OBS HIGH 50: CPT | Performed by: PSYCHIATRY & NEUROLOGY

## 2021-02-17 PROCEDURE — 97530 THERAPEUTIC ACTIVITIES: CPT

## 2021-02-17 PROCEDURE — 80048 BASIC METABOLIC PNL TOTAL CA: CPT

## 2021-02-17 PROCEDURE — 1210000000 HC MED SURG R&B

## 2021-02-17 RX ORDER — MECOBALAMIN 5000 MCG
5 TABLET,DISINTEGRATING ORAL NIGHTLY
Status: DISCONTINUED | OUTPATIENT
Start: 2021-02-17 | End: 2021-02-22 | Stop reason: HOSPADM

## 2021-02-17 RX ADMIN — HYDROCHLOROTHIAZIDE 25 MG: 25 TABLET ORAL at 11:09

## 2021-02-17 RX ADMIN — DOCUSATE SODIUM AND SENNOSIDES 1 TABLET: 8.6; 5 TABLET, FILM COATED ORAL at 11:09

## 2021-02-17 RX ADMIN — SODIUM CHLORIDE: 9 INJECTION, SOLUTION INTRAVENOUS at 11:09

## 2021-02-17 RX ADMIN — CYANOCOBALAMIN 1000 MCG: 1000 INJECTION, SOLUTION INTRAMUSCULAR; SUBCUTANEOUS at 11:11

## 2021-02-17 RX ADMIN — LISINOPRIL 20 MG: 20 TABLET ORAL at 11:10

## 2021-02-17 RX ADMIN — ENOXAPARIN SODIUM 40 MG: 40 INJECTION SUBCUTANEOUS at 11:09

## 2021-02-17 RX ADMIN — SODIUM CHLORIDE: 9 INJECTION, SOLUTION INTRAVENOUS at 21:06

## 2021-02-17 RX ADMIN — FOLIC ACID 1 MG: 1 TABLET ORAL at 11:10

## 2021-02-17 RX ADMIN — DOCUSATE SODIUM AND SENNOSIDES 1 TABLET: 8.6; 5 TABLET, FILM COATED ORAL at 21:07

## 2021-02-17 RX ADMIN — THIAMINE HYDROCHLORIDE 100 MG: 100 INJECTION, SOLUTION INTRAMUSCULAR; INTRAVENOUS at 11:10

## 2021-02-17 RX ADMIN — THERA TABS 1 TABLET: TAB at 11:10

## 2021-02-17 RX ADMIN — SODIUM CHLORIDE, PRESERVATIVE FREE 10 ML: 5 INJECTION INTRAVENOUS at 21:07

## 2021-02-17 RX ADMIN — Medication 5 MG: at 21:07

## 2021-02-17 ASSESSMENT — PAIN - FUNCTIONAL ASSESSMENT: PAIN_FUNCTIONAL_ASSESSMENT: PREVENTS OR INTERFERES WITH ALL ACTIVE AND SOME PASSIVE ACTIVITIES

## 2021-02-17 ASSESSMENT — PAIN DESCRIPTION - ORIENTATION: ORIENTATION: LEFT

## 2021-02-17 ASSESSMENT — PAIN DESCRIPTION - ONSET: ONSET: ON-GOING

## 2021-02-17 ASSESSMENT — PAIN DESCRIPTION - FREQUENCY: FREQUENCY: CONTINUOUS

## 2021-02-17 ASSESSMENT — PAIN DESCRIPTION - DESCRIPTORS: DESCRIPTORS: ACHING;SORE

## 2021-02-17 NOTE — PROGRESS NOTES
Patient:   Moe Childs  MR#:    [id number]   Room:    4548/438-16   YOB: 1945  Date of Progress Note: 2/17/2021  Time of Note                           10:14 AM  Consulting Physician:   Rachel Dunlap M.D. Attending Physician:  Osile Cooper MD     Chief complaint AMS    S:This 67 y.o. male  with a past medical history significant for HTN and smoking is seen for altered mental status following left femur fracture and repair post fall on 2/13, 3 days prior to consultation. There was no apparent head injury and he was alert, appropriate and conversant following the injury. The patient underwent cephalomedullary nailing on 2/13 of the left femur and did well post operatively. On 2/14 there is report that patient was becoming confused and agitated but then the am of 2/15 he was better. The patient became confused with some agitation. This am nursing notes patient was yelling out and seeing bugs crawling on ceiling. Wants to go home. He is on some post op antibiotics for possible infections. Urine analysis and CXR have been unremarkable. His blood cultures have been negative so far. He is on a low dose of Percocet as well. He denies drinking alcohol in 40 years. He does smoke. His B12 was essentially undetectable (lower than the threshold reportable. Ativan 1 mg x 1 given yesterday with agitation. His wife denies any significant cognitive issues at baseline.      REVIEW OF SYSTEMS:  Limited due to mental status    Past Medical History:      Diagnosis Date    Cerebral artery occlusion with cerebral infarction ([facility] [address]     tia    Hypertension        Past Surgical History:      Procedure Laterality Date    FEMUR FRACTURE SURGERY Left 2/13/2021    FEMUR IM NAIL GILDARDO INSERTION performed by Stevan Field MD at [facility] OR       Medications in Hospital:      Current Facility-Administered Medications:     melatonin disintegrating tablet 5 mg, 5 mg, Oral, Nightly, Rachel Dunlap MD    LORazepam (ATIVAN) injection 1 mg, 1 mg, Intravenous, Q4H PRN, Meek Goetz MD, 1 mg at 02/16/21 2117    cyanocobalamin injection 1,000 mcg, 1,000 mcg, Intramuscular, Daily, Meek Goetz MD, 1,000 mcg at 02/16/21 1426    multivitamin 1 tablet, 1 tablet, Oral, Daily, Meek Goetz MD, 1 tablet at 02/16/21 1426    thiamine (B-1) injection 100 mg, 100 mg, Intravenous, Daily, Meek Goetz MD, 100 mg at 23/09/41 7706    folic acid (FOLVITE) tablet 1 mg, 1 mg, Oral, Daily, Meek Goetz MD, 1 mg at 02/16/21 1427    [START ON 2/27/2021] cyanocobalamin injection 1,000 mcg, 1,000 mcg, Intramuscular, Q7 Days **AND** [START ON 4/6/2021] cyanocobalamin injection 1,000 mcg, 1,000 mcg, Intramuscular, Q30 Days, Meek Goetz MD    oxyCODONE-acetaminophen (PERCOCET) 5-325 MG per tablet 1 tablet, 1 tablet, Oral, Q6H PRN, Richard Wall MD, 1 tablet at 02/16/21 0306    nicotine (NICODERM CQ) 21 MG/24HR 1 patch, 1 patch, Transdermal, Daily, Richard Wall MD, 1 patch at 02/16/21 0842    insulin lispro (HUMALOG) injection vial 0-6 Units, 0-6 Units, Subcutaneous, TID WC, Richard Wall MD    insulin lispro (HUMALOG) injection vial 0-3 Units, 0-3 Units, Subcutaneous, Nightly, Richard Wall MD    glucose (GLUTOSE) 40 % oral gel 15 g, 15 g, Oral, PRN, Richard Wall MD    dextrose 50 % IV solution, 12.5 g, Intravenous, PRN, Richard Wall MD    glucagon (rDNA) injection 1 mg, 1 mg, Intramuscular, PRN, Richard Wall MD    dextrose 5 % solution, 100 mL/hr, Intravenous, PRN, Richard Wall MD    0.9 % sodium chloride infusion, , Intravenous, Continuous, Richard Wall MD, Last Rate: 125 mL/hr at 02/15/21 1414, New Bag at 02/15/21 1414    ondansetron (ZOFRAN) injection 4 mg, 4 mg, Intravenous, Once, Geovani Lopez MD    sodium chloride flush 0.9 % injection 10 mL, 10 mL, Intravenous, 2 times per day, Richard Wall MD, 10 mL at 02/16/21 2117    sodium chloride flush 0.9 % injection masses noted, no jugular vein distension  Respiration- chest wall appears symmetric, good expansion,   normal effort without use of accessory muscles  Musculoskeletal - no significant wasting of muscles noted, no bony deformities  Extremities-no clubbing, cyanosis or edema  Skin - warm, dry, and intact. No rash, erythema, or pallor. Psychiatric - mood, affect, and behavior restless     Neurological exam  Awake, slow confused and a bit restless gets agitated easily and not cooperative  Fluent oriented to self  Attention and concentration appear impaired  Recent and remote memory appears impaired  Speech no dysarthria  No clear issues with language of fund of knowledge     Cranial Nerve Exam     CN III, IV,VI-EOMI, No nystagmus, conjugate eye movements, no ptosis    CN VII-no facial assymetry       Motor Exam  antigravity throughout upper and lower extremities bilaterally      Tremors- no tremors in hands or head noted     Gait  Not tested    Nursing/pcp notes, imaging,labs and vitals reviewed.      PT,OT and/or speech notes reviewed    Lab Results   Component Value Date    WBC 8.6 02/17/2021    HGB 8.9 (L) 02/17/2021    HCT 28.7 (L) 02/17/2021    .5 (H) 02/17/2021     02/17/2021     Lab Results   Component Value Date     (L) 02/17/2021    K 4.0 02/17/2021    CL 98 02/17/2021    CO2 29 02/17/2021    BUN 20 02/17/2021    CREATININE 0.7 02/17/2021    GLUCOSE 108 02/17/2021    CALCIUM 8.4 (L) 02/17/2021    PROT 6.7 02/14/2021    LABALBU 3.5 02/14/2021    BILITOT 0.6 02/14/2021    ALKPHOS 49 02/14/2021    AST 18 02/14/2021    ALT 8 02/14/2021    LABGLOM >60 02/17/2021    GFRAA >59 02/17/2021     Lab Results   Component Value Date    INR 1.07 02/13/2021    PROTIME 13.9 02/13/2021     EEG awake and asleep portable [[id number]]    Resulted: 02/17/21 0811    Updated: 02/17/21 0813    Narrative:     Morris Cruz MD     2/17/2021  8:13 AM   Patient: Cony Pringle   MR#:    [id number]   Room:    8467/394-43 stopped     HTN-on meds monitor      Post op left femur fracture and repair- minimize pain meds as able     DVT prophylaxis-Lovenox     PT/OT    Will try Melatonin tonight and try to avoid Ativan     To low level for acute inpatient rehab       DW wife      [facility]  [phone] CELL  Dr Luis Miguel Nj

## 2021-02-17 NOTE — CARE COORDINATION
SW had lengthy conversation with Pt spouse in the room along with Pt. Pt spouse prefers to go home once medically cleared and try things out with home health services. Pt spouse explains if she gets home and she is unable to care for him then she will look at other options. SW explained the concerns with taking Pt home and again Pt spouse states she would prefer to try it at home first. Pt spouse asked to speak with Hospitalist staff and message was relayed. Pt will need dme orders for walker & bedside commode along with possibly wheelchair and hospital bed.  SW will continue to follow Pt while in the hospital.   Electronically signed by Pancho Martinez on 2/17/2021 at 10:18 AM

## 2021-02-17 NOTE — PROGRESS NOTES
awareness;Decreased cognition;Decreased endurance;Decreased posture; Increased pain;Decreased balance;Decreased coordination   Assessment Pt slightly improved from previous tx, less severe L lateral lean in sitting and standing. Cont to have increased difficulty with standing, fatigues very quickly and is unable to take any side steps at this time. Pt cont to demand to go home today, but would be unsafe to do so. Pt returned to supine, positioned for comfort with all needs in reach. Activity Tolerance   Activity Tolerance Patient limited by cognitive status; Patient limited by endurance; Patient limited by pain   Safety Devices   Type of devices Bed alarm in place;Call light within reach;Gait belt;Left in bed  (wife present )         Electronically signed by Piper Echeverria PTA on 2/17/2021 at 4:09 PM

## 2021-02-17 NOTE — PROGRESS NOTES
[facility] Progress Note    Patient:  Shyla Nugent  YOB: 1945  Date of Service: 2/17/2021  MRN: [id number]   Acct: [de-identified]   Primary Care Physician: Hao Head DO  Advance Directive: Full Code  Admit Date: 2/13/2021       Hospital Day: 4      CHIEF COMPLAINT:    Chief Complaint   Patient presents with    Fall    Hip Injury       2/17/2021 9:19 AM  Subjective / Interval History:   02/17/2021  Patient seen and examined this AM.  Doing well. Family at bedside. Continues to have intermittent confusion. Laying comfortably in bed in no acute distress. No acute changes or acute overnight event reported. 02/16/2021  Patient seen and examined this AM.  Continues to be agitated, confused and with intermittent combativeness. No other acute changes or acute overnight event reported. Patient endorses fairly controlled pain. 02/15/2021  Patient with reported acute change in mental status with some agitation and delirium yesterday. Patient seen and examined this AM.  No new complaints. Mental status improved. Laying comfortably in bed in no acute distress. No other acute changes or acute overnight event reported. Left hip pain fairly controlled. 02/14/2021  Patient seen and examined this AM.  Doing well. No new complaints. Left hip pain fairly controlled. Laying comfortably in bed in no acute distress. No acute changes or acute overnight event reported. Status post cephalomedullary nailing of left hip yesterday. Doing well postop. Review of Systems:   Review of Systems   Unable to perform ROS: Mental status change         DIET CARDIAC;     Intake/Output Summary (Last 24 hours) at 2/17/2021 0919  Last data filed at 2/17/2021 0814  Gross per 24 hour   Intake 2402.31 ml   Output 200 ml   Net 2202.31 ml       Medications:   dextrose      sodium chloride 125 mL/hr at 02/15/21 1414     Current Facility-Administered Medications   Medication Dose Route Frequency Provider Last Rate Last Admin    melatonin disintegrating tablet 5 mg  5 mg Oral Nightly Carlos Mcqueen MD        LORazepam (ATIVAN) injection 1 mg  1 mg Intravenous Q4H PRN Carlos Mcqueen MD   1 mg at 02/16/21 2117    cyanocobalamin injection 1,000 mcg  1,000 mcg Intramuscular Daily Carlos Mcqueen MD   1,000 mcg at 02/16/21 1426    multivitamin 1 tablet  1 tablet Oral Daily Carlos Mcqueen MD   1 tablet at 02/16/21 1426    thiamine (B-1) injection 100 mg  100 mg Intravenous Daily Carlos Mcqueen MD   100 mg at 21/21/86 2045    folic acid (FOLVITE) tablet 1 mg  1 mg Oral Daily Carlos Mcqueen MD   1 mg at 02/16/21 1427    [START ON 2/27/2021] cyanocobalamin injection 1,000 mcg  1,000 mcg Intramuscular Q7 Days Carlos Mcqueen MD        And    [START ON 4/6/2021] cyanocobalamin injection 1,000 mcg  1,000 mcg Intramuscular Q30 Days Carlos Mcqueen MD        oxyCODONE-acetaminophen (PERCOCET) 5-325 MG per tablet 1 tablet  1 tablet Oral Q6H PRN Carloz French MD   1 tablet at 02/16/21 0306    nicotine (NICODERM CQ) 21 MG/24HR 1 patch  1 patch Transdermal Daily Carloz French MD   1 patch at 02/16/21 0842    insulin lispro (HUMALOG) injection vial 0-6 Units  0-6 Units Subcutaneous TID WC Carloz French MD        insulin lispro (HUMALOG) injection vial 0-3 Units  0-3 Units Subcutaneous Nightly Carloz French MD        glucose (GLUTOSE) 40 % oral gel 15 g  15 g Oral PRN Carloz French MD        dextrose 50 % IV solution  12.5 g Intravenous PRN Carloz French MD        glucagon (rDNA) injection 1 mg  1 mg Intramuscular PRN Carloz French MD        dextrose 5 % solution  100 mL/hr Intravenous PRN Carloz French MD        0.9 % sodium chloride infusion   Intravenous Continuous Carloz French  mL/hr at 02/15/21 1414 New Bag at 02/15/21 1414    ondansetron (ZOFRAN) injection 4 mg  4 mg Intravenous Once Elizabeth Goss MD        sodium chloride flush 0.9 % injection 10 mL  10 mL Intravenous 2 times per day Marisol Alcaraz MD   10 mL at 02/16/21 2117    sodium chloride flush 0.9 % injection 10 mL  10 mL Intravenous PRN Marisol Alcaraz MD        potassium chloride (KLOR-CON M) extended release tablet 40 mEq  40 mEq Oral PRN Marisol Alcaraz MD        Or    potassium bicarb-citric acid (EFFER-K) effervescent tablet 40 mEq  40 mEq Oral PRN Marisol Alcaraz MD        Or    potassium chloride 10 mEq/100 mL IVPB (Peripheral Line)  10 mEq Intravenous PRN Marisol Alcaraz MD        promethazine (PHENERGAN) tablet 12.5 mg  12.5 mg Oral Q6H PRN Marisol Alcaraz MD   12.5 mg at 02/15/21 2116    Or    ondansetron (ZOFRAN) injection 4 mg  4 mg Intravenous Q6H PRN Marisol Alcaraz MD        magnesium hydroxide (MILK OF MAGNESIA) 400 MG/5ML suspension 30 mL  30 mL Oral Daily PRN Marisol Alcaraz MD        acetaminophen (TYLENOL) tablet 650 mg  650 mg Oral Q6H PRN Marisol Alcaraz MD        Or    acetaminophen (TYLENOL) suppository 650 mg  650 mg Rectal Q6H PRN Marisol Alcaraz MD        sennosides-docusate sodium (SENOKOT-S) 8.6-50 MG tablet 1 tablet  1 tablet Oral BID Rui Isidro MD   1 tablet at 02/16/21 2117    enoxaparin (LOVENOX) injection 40 mg  40 mg Subcutaneous Daily Rui Isidro MD   40 mg at 02/16/21 0841    lisinopril (PRINIVIL;ZESTRIL) tablet 20 mg  20 mg Oral Daily Marisol Alcaraz MD   20 mg at 02/16/21 0841    And    hydroCHLOROthiazide (HYDRODIURIL) tablet 25 mg  25 mg Oral Daily Marisol Alcaraz MD   25 mg at 02/16/21 0841         dextrose      sodium chloride 125 mL/hr at 02/15/21 1414      melatonin  5 mg Oral Nightly    cyanocobalamin  1,000 mcg Intramuscular Daily    multivitamin  1 tablet Oral Daily    thiamine  100 mg Intravenous Daily    folic acid  1 mg Oral Daily    [START ON 2/27/2021] cyanocobalamin  1,000 mcg Intramuscular Q7 Days    And    [START ON 4/6/2021] cyanocobalamin  1,000 mcg Intramuscular Q30 Days    nicotine  1 patch Transdermal Daily    insulin lispro  0-6 Units Subcutaneous TID     insulin lispro  0-3 Units Subcutaneous Nightly    ondansetron  4 mg Intravenous Once    sodium chloride flush  10 mL Intravenous 2 times per day    sennosides-docusate sodium  1 tablet Oral BID    enoxaparin  40 mg Subcutaneous Daily    lisinopril  20 mg Oral Daily    And    hydroCHLOROthiazide  25 mg Oral Daily     LORazepam, oxyCODONE-acetaminophen, glucose, dextrose, glucagon (rDNA), dextrose, sodium chloride flush, potassium chloride **OR** potassium alternative oral replacement **OR** potassium chloride, promethazine **OR** ondansetron, magnesium hydroxide, acetaminophen **OR** acetaminophen  DIET CARDIAC;       Labs:   CBC with DIFF:  Recent Labs     02/15/21  0529 02/16/21  0214 02/17/21  0324   WBC 11.6* 10.5 8.6   RBC 2.98* 2.79* 2.80*   HGB 9.4* 8.9* 8.9*   HCT 29.6* 27.6* 28.7*   MCV 99.3* 98.9* 102.5*   MCH 31.5* 31.9* 31.8*   MCHC 31.8* 32.2* 31.0*   RDW 12.4 12.3 12.1   * 138 150   MPV 11.4 11.1 11.0   NEUTOPHILPCT 76.5* 80.8* 78.0*   LYMPHOPCT 15.7* 12.2* 12.6*   MONOPCT 7.0 5.7 7.7   EOSRELPCT 0.1 0.7 1.0   BASOPCT 0.2 0.1 0.1   NEUTROABS 8.9* 8.5* 6.7   LYMPHSABS 1.8 1.3 1.1   MONOSABS 0.80 0.60 0.70   EOSABS 0.00 0.10 0.10   BASOSABS 0.00 0.00 0.00       CMP/BMP:  Recent Labs     02/14/21  1657 02/14/21  1657 02/15/21  0529 02/16/21 0214 02/16/21  1414 02/17/21  0324   *  --  136 135*  --  134*   K 4.5   < > 4.1 3.7 3.9 4.0   CL 94*  --  102 100  --  98   CO2 24  --  27 27  --  29   ANIONGAP 12  --  7 8  --  7   GLUCOSE 145*  --  112* 93  --  108   BUN 31*  --  29* 21  --  20   CREATININE 1.3*  --  1.0 0.8  --  0.7   LABGLOM 54*  --  >60 >60  --  >60   CALCIUM 8.8  --  8.1* 8.2*  --  8.4*   PROT 6.7  --   --   --   --   --    LABALBU 3.5  --   --   --   --   --    BILITOT 0.6  --   --   --   --   --    ALKPHOS 49  --   --   --   --   --    ALT 8  --   --   --   -- right hip is unremarkable. There is an acute  intratrochanteric left hip fracture oriented vertically, with mild lateral displacement, no significant angulation or involvement of the femoral head. The other pelvic osseous structures appear unremarkable. Acute mildly displaced closed intratrochanteric left hip fracture as described. Signed by Dr Ulises Simms. Bhavani on 2/13/2021 12:16 PM        Objective:   Vitals:   BP (!) 157/81   Pulse 87   Temp 97.7 °F (36.5 °C)   Resp 18   Ht 6' (1.829 m)   Wt 195 lb (88.5 kg)   SpO2 99%   BMI 26.45 kg/m²       Patient Vitals for the past 24 hrs:   BP Temp Temp src Pulse Resp SpO2   02/17/21 0648 (!) 157/81 97.7 °F (36.5 °C) -- 87 18 --   02/17/21 0430 (!) 152/80 96.6 °F (35.9 °C) Temporal 84 20 99 %   02/17/21 0322 -- -- -- 104 -- --   02/16/21 1932 (!) 170/93 98.5 °F (36.9 °C) Temporal 104 22 94 %   02/16/21 1059 (!) 148/92 98.7 °F (37.1 °C) Temporal 97 18 95 %       24HR INTAKE/OUTPUT:      Intake/Output Summary (Last 24 hours) at 2/17/2021 0919  Last data filed at 2/17/2021 1164  Gross per 24 hour   Intake 2402.31 ml   Output 200 ml   Net 2202.31 ml       Physical Exam  Vitals signs and nursing note reviewed. Constitutional:       General: He is not in acute distress. Appearance: Normal appearance. He is not ill-appearing, toxic-appearing or diaphoretic. HENT:      Head: Normocephalic and atraumatic. Right Ear: External ear normal.      Left Ear: External ear normal.      Nose: Nose normal. No congestion or rhinorrhea. Mouth/Throat:      Mouth: Mucous membranes are moist.      Pharynx: Oropharynx is clear. No oropharyngeal exudate or posterior oropharyngeal erythema. Eyes:      General: No scleral icterus. Right eye: No discharge. Left eye: No discharge. Extraocular Movements: Extraocular movements intact. Conjunctiva/sclera: Conjunctivae normal.   Neck:      Musculoskeletal: Normal range of motion and neck supple.  No neck rigidity or muscular tenderness. Vascular: No carotid bruit. Cardiovascular:      Rate and Rhythm: Normal rate and regular rhythm. Pulses: Normal pulses. Heart sounds: Normal heart sounds. No murmur. No friction rub. No gallop. Pulmonary:      Effort: Pulmonary effort is normal. No respiratory distress. Breath sounds: Normal breath sounds. No stridor. No wheezing, rhonchi or rales. Chest:      Chest wall: No tenderness. Abdominal:      General: Bowel sounds are normal. There is no distension. Palpations: Abdomen is soft. Tenderness: There is no abdominal tenderness. There is no guarding or rebound. Musculoskeletal:         General: No swelling, tenderness, deformity or signs of injury. Right lower leg: No edema. Left lower leg: No edema. Comments: Decreased ROM in left hip due to pain. Intact ROM in bilateral upper and right lower extremities. Skin:     General: Skin is warm and dry. Capillary Refill: Capillary refill takes less than 2 seconds. Coloration: Skin is not jaundiced or pale. Findings: No bruising, erythema, lesion or rash. Neurological:      General: No focal deficit present. Mental Status: He is alert. He is disoriented. Cranial Nerves: No cranial nerve deficit. Sensory: No sensory deficit. Motor: No weakness.       Coordination: Coordination normal.           Assessment/plan:         Hospital Problems           Last Modified POA    * (Principal) Hip fracture requiring operative repair, left, closed, initial encounter ([facility] [address] 2/13/2021 Yes    Hyperglycemia 2/13/2021 Yes    Essential hypertension 2/16/2021 Yes    Fall from slipping on ice 2/13/2021 Yes    Altered mental state 2/16/2021 Yes    Smoker 2/16/2021 Yes          Principal Problem:    Hip fracture requiring operative repair, left, closed, initial encounter [facility])  Active Problems:    Hyperglycemia    Essential hypertension    Fall from slipping on ice Altered mental state    Smoker  Resolved Problems:    * No resolved hospital problems. *      Brief Summary  Mr. Jessica Echeverria, a 67 y.o. male with a history of HTN, presenting to [address] [place] [address] ED (02/13/2021) on account an acute onset of moderate to severe*left hip pain, after reported mechanical fall.     Patient reportedly slipped and fell onto the ice, landing on his left hip. Patient reportedly unable to ambulate after the fall due to pain.      Nno other associated symptoms reported. No dizziness, lightheadedness or palpitation reported.     X-ray reported an acute mildly displaced closed intratrochanteric left hip  Fracture     Orthopedic surgery consulted from ED  Patient admitted for further work-up and management. Sepsis  Altered mental status  Leukocytosis, with a left shift  · Likely delirium with superimposed ? Infectious etiology  · WBC of 10.6 on presentation (02/23/2021)  · Briefly worsened leukocytosis: WBC of 15.1--> 16.1 (02/14/2021), with a left shift  · Leukocytosis resolved  · Elevated procalcitonin level  · Initial lactic acid of 2.3 (02/14/2021)  · Repeat lactic acid of 1.1, after IV fluids  · Initially started on empiric antibiotics (vancomycin and cefepime)  · Blood cultures no growth to date  · Observing off antibiotic for now  · UA unremarkable  · Neurology following-appreciate recommendation  ·     Acute mildly displaced closed intratrochanteric left hip  Fracture  · Status post mechanical fall from slipping on ice  · Orthopedics following  · Status post cephalomedullary nailing of left intratrochanteric hip fracture. (02/13/2021)  · Continue symptomatic and supportive management including pain management as needed.   · PT OT       History of hypertension  · Continue home regimen  · Lisinopril-HCTZ 20-25 mg p.o. daily     Hyperglycemia  · No documented history of diabetes  · Hemoglobin A1c level: 5.6%  · However patient with persistent hyperglycemia  · Glucose of 161 (02/14/2021)  · Insulin as part on low-dose sliding scale  · Continue monitoring POC glucose           Continue management of other chronic medical conditions - see above and orders. Advance Directive: Full Code    DIET CARDIAC;         Consults Made:   IP CONSULT TO ORTHOPEDIC SURGERY  IP CONSULT TO SOCIAL WORK  PHARMACY TO DOSE VANCOMYCIN  IP CONSULT TO REHAB/TCU ADMISSION COORDINATOR  IP CONSULT TO NEUROLOGY    DVT prophylaxis: Enoxaparin      Discharge planning:   Observing off antibiotic  Neurology following for altered mental status  Family interested in SNF placement  Referrals sent, as per  awaiting approval/denial.    Time Spent is 25 mins in the examination, evaluation, counseling and review of medications, assessment and plan.      Electronically signed by   Gio Dwyer MD, MPH,   Internal Medicine Hospitalist   2/17/2021 9:19 AM

## 2021-02-17 NOTE — PROCEDURES
Patient:   Bethany Azul  MR#:    [id number]   Room:    0978/422-12   YOB: 1945  Date of Progress Note: 2/17/2021  Time of Note                           8:11 AM  Consulting Physician:   Kristin Martin M.D. Attending Physician:  Zachary Roberts MD         This is a multichannel digital EEG recording using the international 10-20 placement system. Technical Summary:     Background EEG activity: The occipital dominant rhythm is 6-7 Hz. There is much low to moderate voltage 5-7 Hz activity seen in a generalized distribution intermixed with low voltage 18-22 Hz activity. There is moderate voltage 1.5-3 Hz activity intermittently. Photic Stimulation: No abnormal waveforms are noted with various frequencies of flickering light. IMPRESSION:   There is much movement and myogenic artifact seen throughout the recording. During the interpretable portions the background activity appears to be slightly slow. This finding is consistent with a mild diffisue disturbance in cerebral function. No clear epileptiform activity was noted during the recording period.        Dr Erendira Gray Certified in Neurology  Board Certified in Clinical Neurophysiology  Fellowship trained in [address] [place] [address] Time 23 minutes

## 2021-02-18 ENCOUNTER — APPOINTMENT (OUTPATIENT)
Dept: MRI IMAGING | Age: 76
DRG: 480 | End: 2021-02-18
Payer: MEDICARE

## 2021-02-18 LAB
ANION GAP SERPL CALCULATED.3IONS-SCNC: 11 MMOL/L (ref 7–19)
BASOPHILS ABSOLUTE: 0 K/UL (ref 0–0.2)
BASOPHILS RELATIVE PERCENT: 0.3 % (ref 0–1)
BUN BLDV-MCNC: 23 MG/DL (ref 8–23)
CALCIUM SERPL-MCNC: 8 MG/DL (ref 8.8–10.2)
CHLORIDE BLD-SCNC: 102 MMOL/L (ref 98–111)
CO2: 24 MMOL/L (ref 22–29)
CREAT SERPL-MCNC: 0.7 MG/DL (ref 0.5–1.2)
EOSINOPHILS ABSOLUTE: 0.1 K/UL (ref 0–0.6)
EOSINOPHILS RELATIVE PERCENT: 1.8 % (ref 0–5)
GFR AFRICAN AMERICAN: >59
GFR NON-AFRICAN AMERICAN: >60
GLUCOSE BLD-MCNC: 105 MG/DL (ref 74–109)
GLUCOSE BLD-MCNC: 108 MG/DL (ref 70–99)
GLUCOSE BLD-MCNC: 112 MG/DL (ref 70–99)
GLUCOSE BLD-MCNC: 121 MG/DL (ref 70–99)
GLUCOSE BLD-MCNC: 140 MG/DL (ref 70–99)
HCT VFR BLD CALC: 25.3 % (ref 42–52)
HEMOGLOBIN: 8.5 G/DL (ref 14–18)
IMMATURE GRANULOCYTES #: 0.1 K/UL
LYMPHOCYTES ABSOLUTE: 1.1 K/UL (ref 1.1–4.5)
LYMPHOCYTES RELATIVE PERCENT: 14.1 % (ref 20–40)
MCH RBC QN AUTO: 32.1 PG (ref 27–31)
MCHC RBC AUTO-ENTMCNC: 33.6 G/DL (ref 33–37)
MCV RBC AUTO: 95.5 FL (ref 80–94)
MONOCYTES ABSOLUTE: 0.6 K/UL (ref 0–0.9)
MONOCYTES RELATIVE PERCENT: 8.2 % (ref 0–10)
NEUTROPHILS ABSOLUTE: 5.8 K/UL (ref 1.5–7.5)
NEUTROPHILS RELATIVE PERCENT: 75 % (ref 50–65)
PDW BLD-RTO: 12.2 % (ref 11.5–14.5)
PERFORMED ON: ABNORMAL
PLATELET # BLD: 169 K/UL (ref 130–400)
PMV BLD AUTO: 10.7 FL (ref 9.4–12.4)
POTASSIUM REFLEX MAGNESIUM: 4 MMOL/L (ref 3.5–5)
RBC # BLD: 2.65 M/UL (ref 4.7–6.1)
SODIUM BLD-SCNC: 137 MMOL/L (ref 136–145)
WBC # BLD: 7.8 K/UL (ref 4.8–10.8)

## 2021-02-18 PROCEDURE — 82947 ASSAY GLUCOSE BLOOD QUANT: CPT

## 2021-02-18 PROCEDURE — 97530 THERAPEUTIC ACTIVITIES: CPT

## 2021-02-18 PROCEDURE — 70551 MRI BRAIN STEM W/O DYE: CPT

## 2021-02-18 PROCEDURE — 6360000002 HC RX W HCPCS: Performed by: PSYCHIATRY & NEUROLOGY

## 2021-02-18 PROCEDURE — 80048 BASIC METABOLIC PNL TOTAL CA: CPT

## 2021-02-18 PROCEDURE — 2580000003 HC RX 258: Performed by: INTERNAL MEDICINE

## 2021-02-18 PROCEDURE — 6370000000 HC RX 637 (ALT 250 FOR IP): Performed by: ORTHOPAEDIC SURGERY

## 2021-02-18 PROCEDURE — 36415 COLL VENOUS BLD VENIPUNCTURE: CPT

## 2021-02-18 PROCEDURE — 99233 SBSQ HOSP IP/OBS HIGH 50: CPT | Performed by: PSYCHIATRY & NEUROLOGY

## 2021-02-18 PROCEDURE — 6370000000 HC RX 637 (ALT 250 FOR IP): Performed by: INTERNAL MEDICINE

## 2021-02-18 PROCEDURE — 6360000002 HC RX W HCPCS: Performed by: ORTHOPAEDIC SURGERY

## 2021-02-18 PROCEDURE — 97116 GAIT TRAINING THERAPY: CPT

## 2021-02-18 PROCEDURE — 6370000000 HC RX 637 (ALT 250 FOR IP): Performed by: PSYCHIATRY & NEUROLOGY

## 2021-02-18 PROCEDURE — 85025 COMPLETE CBC W/AUTO DIFF WBC: CPT

## 2021-02-18 PROCEDURE — 1210000000 HC MED SURG R&B

## 2021-02-18 RX ORDER — TRAZODONE HYDROCHLORIDE 50 MG/1
50 TABLET ORAL NIGHTLY PRN
Status: DISCONTINUED | OUTPATIENT
Start: 2021-02-18 | End: 2021-02-22 | Stop reason: HOSPADM

## 2021-02-18 RX ADMIN — HYDROCHLOROTHIAZIDE 25 MG: 25 TABLET ORAL at 09:24

## 2021-02-18 RX ADMIN — SODIUM CHLORIDE, PRESERVATIVE FREE 10 ML: 5 INJECTION INTRAVENOUS at 09:26

## 2021-02-18 RX ADMIN — CYANOCOBALAMIN 1000 MCG: 1000 INJECTION, SOLUTION INTRAMUSCULAR; SUBCUTANEOUS at 09:24

## 2021-02-18 RX ADMIN — THIAMINE HYDROCHLORIDE 100 MG: 100 INJECTION, SOLUTION INTRAMUSCULAR; INTRAVENOUS at 09:24

## 2021-02-18 RX ADMIN — Medication 5 MG: at 21:41

## 2021-02-18 RX ADMIN — INSULIN LISPRO 1 UNITS: 100 INJECTION, SOLUTION INTRAVENOUS; SUBCUTANEOUS at 12:30

## 2021-02-18 RX ADMIN — THERA TABS 1 TABLET: TAB at 09:25

## 2021-02-18 RX ADMIN — ACETAMINOPHEN 650 MG: 325 TABLET ORAL at 11:01

## 2021-02-18 RX ADMIN — LISINOPRIL 20 MG: 20 TABLET ORAL at 09:25

## 2021-02-18 RX ADMIN — ENOXAPARIN SODIUM 40 MG: 40 INJECTION SUBCUTANEOUS at 09:22

## 2021-02-18 RX ADMIN — FOLIC ACID 1 MG: 1 TABLET ORAL at 09:25

## 2021-02-18 RX ADMIN — DOCUSATE SODIUM AND SENNOSIDES 1 TABLET: 8.6; 5 TABLET, FILM COATED ORAL at 09:24

## 2021-02-18 RX ADMIN — DOCUSATE SODIUM AND SENNOSIDES 1 TABLET: 8.6; 5 TABLET, FILM COATED ORAL at 21:41

## 2021-02-18 RX ADMIN — SODIUM CHLORIDE, PRESERVATIVE FREE 10 ML: 5 INJECTION INTRAVENOUS at 21:41

## 2021-02-18 ASSESSMENT — PAIN SCALES - GENERAL: PAINLEVEL_OUTOF10: 10

## 2021-02-18 ASSESSMENT — PAIN DESCRIPTION - ORIENTATION: ORIENTATION: LEFT

## 2021-02-18 ASSESSMENT — PAIN - FUNCTIONAL ASSESSMENT: PAIN_FUNCTIONAL_ASSESSMENT: PREVENTS OR INTERFERES WITH ALL ACTIVE AND SOME PASSIVE ACTIVITIES

## 2021-02-18 ASSESSMENT — PAIN SCALES - WONG BAKER: WONGBAKER_NUMERICALRESPONSE: 4

## 2021-02-18 NOTE — PROGRESS NOTES
Occupational Therapy  Facility/Department: [facility] SURG SERVICES  Daily Treatment Note  NAME: Vandana Estevez  : 1945  MRN: [id number]    Date of Service: 2021    Discharge Recommendations:  Patient would benefit from continued therapy after discharge       Assessment   Assessment: Pt. adamant about returning home tonight, stating he would do better at home and can do it. Pt. currently is Mod A of 1-2 for tfers and is currently unable to cover a household distance ambulating . Could possibly go home at w/c level but would benefit from a week of continued therapy in SNF setting. Patient Diagnosis(es): The primary encounter diagnosis was Closed fracture of left hip, initial encounter ([facility] [address] Diagnoses of Fall due to slipping on ice or snow, initial encounter and Essential hypertension were also pertinent to this visit. has a past medical history of Cerebral artery occlusion with cerebral infarction ([facility] [address] and Hypertension. has a past surgical history that includes Femur fracture surgery (Left, 2021).     Restrictions  Restrictions/Precautions  Restrictions/Precautions: Fall Risk, Weight Bearing  Required Braces or Orthoses?: No  Lower Extremity Weight Bearing Restrictions  Left Lower Extremity Weight Bearing: Weight Bearing As Tolerated  Subjective   General  Chart Reviewed: Yes  Patient assessed for rehabilitation services?: Yes  Response to previous treatment: Patient with no complaints from previous session  Family / Caregiver Present: No  Pain Assessment  Pain Assessment: Faces  Gutierrez-Baker Pain Rating: Hurts little more  Pain Type: Surgical pain  Pain Location: Hip  Pain Orientation: Left  Pain Descriptors: Aching  Functional Pain Assessment: Prevents or interferes some active activities and ADLs  Response to Pain Intervention: Patient Satisfied  Vital Signs  Patient Currently in Pain: Yes   Orientation  Orientation  Overall Orientation Status: Impaired  Orientation Level: Oriented to person;Disoriented to place; Disoriented to situation  Objective                Bed mobility  Supine to Sit: Moderate assistance;2 Person assistance  Sit to Supine: Moderate assistance;Maximum assistance  Transfers  Stand Step Transfers: Moderate assistance;2 Person assistance  Sit to stand: Moderate assistance;2 Person assistance  Transfer Comments: Mod A of 1-2                                                                 Plan   Plan  Times per week: 3-5 days/wk.   G-Code     OutComes Score                                                  AM-PAC Score             Goals  Short term goals  Short term goal 1: Min A for dressing  Short term goal 2: Min A for toileting  Short term goal 3: Min A for transfers  Long term goals  Long term goal 1: Upgrade as tolerated       Therapy Time   Individual Concurrent Group Co-treatment   Time In           Time Out           Minutes                   Yvonne Franklin OTR/L

## 2021-02-18 NOTE — PROGRESS NOTES
Patient:   Cosme Richey  MR#:    [id number]   Room:    [address] [place] [address]   YOB: 1945  Date of Progress Note: 2/18/2021  Time of Note                           10:05 AM  Consulting Physician:   Allison Jones M.D. Attending Physician:  Shira Watson MD     Chief complaint AMS    S:This 67 y.o. male  with a past medical history significant for HTN and smoking is seen for altered mental status following left femur fracture and repair post fall on 2/13, 3 days prior to consultation. There was no apparent head injury and he was alert, appropriate and conversant following the injury. The patient underwent cephalomedullary nailing on 2/13 of the left femur and did well post operatively. On 2/14 there is report that patient was becoming confused and agitated but then the am of 2/15 he was better. The patient became confused with some agitation. This am nursing notes patient was yelling out and seeing bugs crawling on ceiling. Wants to go home. He is on some post op antibiotics for possible infections. Urine analysis and CXR have been unremarkable. His blood cultures have been negative so far. He is on a low dose of Percocet as well. He denies drinking alcohol in 40 years. He does smoke. His B12 was essentially undetectable (lower than the threshold reportable. His wife denies any significant cognitive issues at baseline. More alert but still confused.     REVIEW OF SYSTEMS:  Limited due to mental status    Past Medical History:      Diagnosis Date    Cerebral artery occlusion with cerebral infarction ([address]     tia    Hypertension        Past Surgical History:      Procedure Laterality Date    FEMUR FRACTURE SURGERY Left 2/13/2021    FEMUR IM NAIL GILDARDO INSERTION performed by Joy Walker MD at [facility] OR       Medications in Hospital:      Current Facility-Administered Medications:     melatonin disintegrating tablet 5 mg, 5 mg, Oral, Nightly, Allison Jones MD, 5 mg at 02/17/21 2107    LORazepam (ATIVAN) injection 1 mg, 1 mg, Intravenous, Q4H PRN, Wilmer Winston MD, 1 mg at 02/16/21 2117    cyanocobalamin injection 1,000 mcg, 1,000 mcg, Intramuscular, Daily, Wilmer Winston MD, 1,000 mcg at 02/18/21 3480    multivitamin 1 tablet, 1 tablet, Oral, Daily, Wilmer Winston MD, 1 tablet at 02/18/21 0925    thiamine (B-1) injection 100 mg, 100 mg, Intravenous, Daily, Wilmer Winston MD, 100 mg at 16/33/99 9643    folic acid (FOLVITE) tablet 1 mg, 1 mg, Oral, Daily, Wilmer Winston MD, 1 mg at 02/18/21 0925    [START ON 2/27/2021] cyanocobalamin injection 1,000 mcg, 1,000 mcg, Intramuscular, Q7 Days **AND** [START ON 4/6/2021] cyanocobalamin injection 1,000 mcg, 1,000 mcg, Intramuscular, Q30 Days, Wilmer Winston MD    oxyCODONE-acetaminophen (PERCOCET) 5-325 MG per tablet 1 tablet, 1 tablet, Oral, Q6H PRN, Danny Hernandez MD, 1 tablet at 02/16/21 0306    nicotine (NICODERM CQ) 21 MG/24HR 1 patch, 1 patch, Transdermal, Daily, Danny Hernandez MD, 1 patch at 02/18/21 0925    insulin lispro (HUMALOG) injection vial 0-6 Units, 0-6 Units, Subcutaneous, TID WC, Danny Hernandez MD    insulin lispro (HUMALOG) injection vial 0-3 Units, 0-3 Units, Subcutaneous, Nightly, Danny Hernandez MD    glucose (GLUTOSE) 40 % oral gel 15 g, 15 g, Oral, PRN, Danny Hernandez MD    dextrose 50 % IV solution, 12.5 g, Intravenous, PRN, Danny Hernandez MD    glucagon (rDNA) injection 1 mg, 1 mg, Intramuscular, PRN, Danny Hernandez MD    dextrose 5 % solution, 100 mL/hr, Intravenous, PRN, Danny Hernandez MD    0.9 % sodium chloride infusion, , Intravenous, Continuous, Danny Hernandez MD, Last Rate: 125 mL/hr at 02/17/21 2106, New Bag at 02/17/21 2106    ondansetron (ZOFRAN) injection 4 mg, 4 mg, Intravenous, Once, Edna Wagoner MD    sodium chloride flush 0.9 % injection 10 mL, 10 mL, Intravenous, 2 times per day, Danny Hernandez MD, 10 mL at 02/18/21 0926    sodium chloride flush 0.9 % injection 10 mL, 10 mL, Intravenous, PRN, Yoseph Hull MD    potassium chloride (KLOR-CON M) extended release tablet 40 mEq, 40 mEq, Oral, PRN **OR** potassium bicarb-citric acid (EFFER-K) effervescent tablet 40 mEq, 40 mEq, Oral, PRN **OR** potassium chloride 10 mEq/100 mL IVPB (Peripheral Line), 10 mEq, Intravenous, PRN, Yoseph Hull MD    promethazine (PHENERGAN) tablet 12.5 mg, 12.5 mg, Oral, Q6H PRN, 12.5 mg at 02/15/21 2116 **OR** ondansetron (ZOFRAN) injection 4 mg, 4 mg, Intravenous, Q6H PRN, Yoseph Hull MD    magnesium hydroxide (MILK OF MAGNESIA) 400 MG/5ML suspension 30 mL, 30 mL, Oral, Daily PRN, Yoseph Hull MD    acetaminophen (TYLENOL) tablet 650 mg, 650 mg, Oral, Q6H PRN **OR** acetaminophen (TYLENOL) suppository 650 mg, 650 mg, Rectal, Q6H PRN, Yoseph Hull MD    sennosides-docusate sodium (SENOKOT-S) 8.6-50 MG tablet 1 tablet, 1 tablet, Oral, BID, Roscoe West MD, 1 tablet at 02/18/21 0924    enoxaparin (LOVENOX) injection 40 mg, 40 mg, Subcutaneous, Daily, Roscoe West MD, 40 mg at 02/18/21 0263    lisinopril (PRINIVIL;ZESTRIL) tablet 20 mg, 20 mg, Oral, Daily, 20 mg at 02/18/21 0925 **AND** hydroCHLOROthiazide (HYDRODIURIL) tablet 25 mg, 25 mg, Oral, Daily, Yoseph Hull MD, 25 mg at 02/18/21 1494    Allergies:  Patient has no known allergies. Social History:   TOBACCO:   reports that he has been smoking. He has been smoking about 1.00 pack per day. He has never used smokeless tobacco.  ETOH:   reports no history of alcohol use. Family History:   History reviewed. No pertinent family history. PHYSICAL EXAM:  BP (!) 164/82   Pulse 92   Temp 98.7 °F (37.1 °C)   Resp 18   Ht 6' (1.829 m)   Wt 195 lb (88.5 kg)   SpO2 91%   BMI 26.45 kg/m²     Constitutional - well developed, well nourished.    Eyes - conjunctiva normal.   Ear, nose, throat - No scars, masses, or lesions over external nose or ears, no atrophy of tongue  Neck-symmetric, no masses noted, no jugular vein distension  Respiration- chest wall appears symmetric, good expansion,   normal effort without use of accessory muscles  Musculoskeletal - no significant wasting of muscles noted, no bony deformities  Extremities-no clubbing, cyanosis or edema  Skin - warm, dry, and intact. No rash, erythema, or pallor. Psychiatric - mood, affect, and behavior restless     Neurological exam  Awake, alert oriented x 2 follows commands but wants to go home  Fluent   Attention and concentration appear impaired  Recent and remote memory appears impaired  Speech no dysarthria  No clear issues with language of fund of knowledge     Cranial Nerve Exam     CN III, IV,VI-EOMI, No nystagmus, conjugate eye movements, no ptosis    CN VII-no facial assymetry       Motor Exam  antigravity throughout upper extremities bilaterally      Tremors- no tremors in hands or head noted     Gait  Not tested    Nursing/pcp notes, imaging,labs and vitals reviewed.      PT,OT and/or speech notes reviewed    Lab Results   Component Value Date    WBC 7.8 02/18/2021    HGB 8.5 (L) 02/18/2021    HCT 25.3 (L) 02/18/2021    MCV 95.5 (H) 02/18/2021     02/18/2021     Lab Results   Component Value Date     02/18/2021    K 4.0 02/18/2021     02/18/2021    CO2 24 02/18/2021    BUN 23 02/18/2021    CREATININE 0.7 02/18/2021    GLUCOSE 105 02/18/2021    CALCIUM 8.0 (L) 02/18/2021    PROT 6.7 02/14/2021    LABALBU 3.5 02/14/2021    BILITOT 0.6 02/14/2021    ALKPHOS 49 02/14/2021    AST 18 02/14/2021    ALT 8 02/14/2021    LABGLOM >60 02/18/2021    GFRAA >59 02/18/2021     Lab Results   Component Value Date    INR 1.07 02/13/2021    PROTIME 13.9 02/13/2021     EEG awake and asleep portable [[id number]]    Resulted: 02/17/21 0811    Updated: 02/17/21 0813    Narrative:     Claude Bologna, MD     2/17/2021  8:13 AM   Patient: Joel Prince   MR#:    [id number]   Room:    8335/950-20   Date of Birth:   1945   Date of Progress Note: 2/17/2021   Time of Note                           8:11 AM   Consulting Physician:   Lupe Rosas M.D. Attending Physician: Dustin Denton MD           This is a multichannel digital EEG recording using the   international 10-20 placement system. Technical Summary:     Background EEG activity: The occipital dominant rhythm is 6-7 Hz. There is much low to moderate voltage 5-7 Hz activity seen in a   generalized distribution intermixed with low voltage 18-22 Hz   activity. There is moderate voltage 1.5-3 Hz activity   intermittently. Photic Stimulation: No abnormal waveforms are noted with various   frequencies of flickering light. IMPRESSION:   There is much movement and myogenic artifact seen throughout the   recording. During the interpretable portions the background   activity appears to be slightly slow. This finding is consistent   with a mild diffisue disturbance in cerebral function. No clear   epileptiform activity was noted during the recording period.        Dr Edilberto Robert Certified in Neurology   Board Certified in [place] [address] Neurophysiology   Fellowship trained in [place] [address] Neurophysiology              RECORD REVIEW: Previous medical records, medications were reviewed at today's visit    IMPRESSION:   Altered mental status-post op more alert but still confused and wanting to go home     Initial Exam  Patient is oriented but agitated insisting he wants to go home  Does not want to cooperate with exam but except for left hip, grossly non focal  CT head no acute changes-chronic small vessel disease/atrophy with associated ventricular prominence  Patient's wife denies any dementia at baseline        B12 essentially undetectable  Start B12 injections  Place IV thiamine/MVI and Folic acid   TSH/ammonia ok     EEG significant movement artifact but appears to be slightly slow     Denies any history of ETOH use     Smoker-Abg with evidence of hypoxia     Antibiotics stopped     HTN-on meds monitor      Post op left femur fracture and repair- minimize pain meds as able     DVT prophylaxis-Lovenox     PT/OT    Will try to get MRI brain if able     To low level for acute inpatient rehab     Try Trazodone at night to assist with sleep    DW wife      Noah Phipps  [phone] CELL  Dr Robb Reddy

## 2021-02-18 NOTE — PROGRESS NOTES
Physical Therapy  Name: Onelia Herrera  MRN:  [id number]  Date of service:  2/18/2021 02/18/21 1030   General   Missed reason Conflicting appointment   Subjective   Subjective Attempt: Pt out of room for MRI         Electronically signed by Richa Marion PTA on 2/18/2021 at 10:31 AM

## 2021-02-18 NOTE — PROGRESS NOTES
Physical Therapy  Name: Abraham Lara  MRN:  [id number]  Date of service:  2/18/2021 02/18/21 1327   Restrictions/Precautions   Restrictions/Precautions Fall Risk;Weight Bearing   Required Braces or Orthoses? No   Lower Extremity Weight Bearing Restrictions   Left Lower Extremity Weight Bearing Weight Bearing As Tolerated   General   Chart Reviewed Yes   Subjective   Subjective Pt in bed, agrees to work with therapy. Pain Screening   Patient Currently in Pain Yes   Intervention List Patient able to continue with treatment   Pain Assessment   Pain Assessment Faces   Pain Level 4   Pain Type Acute pain;Surgical pain   Pain Location Leg;Hip   Pain Orientation Left   Pain Descriptors Aching; Sore   Pain Frequency Continuous   Functional Pain Assessment Prevents or interferes with all active and some passive activities   Non-Pharmaceutical Pain Intervention(s) Ambulation/Increased Activity;Repositioned   Response to Pain Intervention Patient Satisfied  (once positioned for comfort)   Bed Mobility   Supine to Sit Moderate assistance  (x2)   Transfers   Sit to Stand Moderate Assistance;2 Person Assistance   Stand to sit Moderate Assistance;2 Person Assistance   Bed to Chair Moderate assistance;2 Person Assistance   Comment pt able to stand and take steps to recliner   Ambulation   Ambulation?  Yes   WB Status FWBAT LLE   Ambulation 1   Surface level tile   Device Rolling Walker   Assistance Moderate assistance;2 Person assistance   Quality of Gait antalgic   Gait Deviations Slow Kayleen;Decreased step length;Decreased step height   Distance 3'   Comments bed to chair   Short term goals   Time Frame for Short term goals 2 wks   Short term goal 1 supine to sit indep   Short term goal 2 sit to stand indep   Short term goal 3 amb. 100' with RW SBA   Short term goal 4 up/down 3 stairs SBA   Conditions Requiring Skilled Therapeutic Intervention   Body structures, Functions, Activity limitations Decreased functional mobility ;Decreased ROM; Decreased strength;Decreased safe awareness;Decreased cognition;Decreased endurance;Decreased posture; Increased pain;Decreased balance;Decreased coordination   Assessment Pt requiring slightly less assist to stand this tx, able to take small, antalgic steps from bed over to recliner. Does cont to require assist x2 for all bed mobility and TF but showing some improvement. Pt in recliner with all needs in reach following tx. Activity Tolerance   Activity Tolerance Patient limited by cognitive status; Patient limited by endurance; Patient limited by pain   Safety Devices   Type of devices Call light within reach; Chair alarm in place;Gait belt;Left in chair  (wife present)         Electronically signed by Aracelis Odonnell PTA on 2/18/2021 at 1:33 PM

## 2021-02-18 NOTE — PROGRESS NOTES
Physician Progress Note      PATIENT:               Lorenzo Pope  CSN #:                  [id number]  :                       1945  ADMIT DATE:       2021 11:14 AM  DISCH DATE:  RESPONDING  PROVIDER #:        Dorise Gowers MD          QUERY TEXT:    Patient admitted with fractured left hip. Noted documentation of sepsis in   progress notes 2/15/21-21. If possible, please document in progress notes   and discharge summary the source of sepsis:    The medical record reflects the following:  Risk Factors: s/p cephalomedullary nailing of left hip  Clinical Indicators: WBC 16.1, altered mental status, creat increase from 0.8   () to 1.3 (), blood cultures NGTD, urine cultures with no growth, MRSA   screen negative  Treatment: (empiric antibiotics) Vancomycin, and Cefepime    Thank you,  Denise Lopez RN, CDS  [phone]  Options provided:  -- Sepsis, not present on admission due to, Please document source. -- Sepsis, now resolved, due to, Please document source. -- Sepsis ruled out  -- Other - I will add my own diagnosis  -- Disagree - Not applicable / Not valid  -- Disagree - Clinically unable to determine / Unknown  -- Refer to Clinical Documentation Reviewer    PROVIDER RESPONSE TEXT:    Sepsis was ruled out in this patient.     Query created by: Osvaldo Suarez on 2021 7:38 AM      Electronically signed by:  Dorise Gowers MD 2021 5:44 PM

## 2021-02-18 NOTE — PROGRESS NOTES
[facility] Progress Note    Patient:  Rafael Bolden  YOB: 1945  Date of Service: 2/18/2021  MRN: [id number]   Acct: [de-identified]   Primary Care Physician: Nicolas Cotto DO  Advance Directive: Full Code  Admit Date: 2/13/2021       Hospital Day: 5      CHIEF COMPLAINT:    Chief Complaint   Patient presents with    Fall    Hip Injury       2/18/2021 7:34 AM  Subjective / Interval History:   02/18/2021  Patient seen and examined this AM.  Doing well. Reportedly with some visual hallucinations this a.m. Patient reportedly was noted to be yelling and saying he was seeing bugs crawling on the ceiling. No acute changes or acute overnight event reported. Mental status improved. Family at bedside. 02/17/2021  Patient seen and examined this AM.  Doing well. Family at bedside. Continues to have intermittent confusion. Laying comfortably in bed in no acute distress. No acute changes or acute overnight event reported. 02/16/2021  Patient seen and examined this AM.  Continues to be agitated, confused and with intermittent combativeness. No other acute changes or acute overnight event reported. Patient endorses fairly controlled pain. 02/15/2021  Patient with reported acute change in mental status with some agitation and delirium yesterday. Patient seen and examined this AM.  No new complaints. Mental status improved. Laying comfortably in bed in no acute distress. No other acute changes or acute overnight event reported. Left hip pain fairly controlled. 02/14/2021  Patient seen and examined this AM.  Doing well. No new complaints. Left hip pain fairly controlled. Laying comfortably in bed in no acute distress. No acute changes or acute overnight event reported. Status post cephalomedullary nailing of left hip yesterday. Doing well postop.       Review of Systems:   Review of Systems   Unable to perform ROS: Mental status change         DIET CARDIAC;     Intake/Output Summary (Last 24 hours) at 2/18/2021 0734  Last data filed at 2/18/2021 0442  Gross per 24 hour   Intake 2699.8 ml   Output 1300 ml   Net 1399.8 ml       Medications:   dextrose      sodium chloride 125 mL/hr at 02/17/21 2106     Current Facility-Administered Medications   Medication Dose Route Frequency Provider Last Rate Last Admin    melatonin disintegrating tablet 5 mg  5 mg Oral Nightly Isac Reeder MD   5 mg at 02/17/21 2107    LORazepam (ATIVAN) injection 1 mg  1 mg Intravenous Q4H PRN Isac Reeder MD   1 mg at 02/16/21 2117    cyanocobalamin injection 1,000 mcg  1,000 mcg Intramuscular Daily Isac Reeder MD   1,000 mcg at 02/17/21 1111    multivitamin 1 tablet  1 tablet Oral Daily Isac Reeder MD   1 tablet at 02/17/21 1110    thiamine (B-1) injection 100 mg  100 mg Intravenous Daily Isac Reeder MD   100 mg at 48/57/95 8916    folic acid (FOLVITE) tablet 1 mg  1 mg Oral Daily Isac Reeder MD   1 mg at 02/17/21 1110    [START ON 2/27/2021] cyanocobalamin injection 1,000 mcg  1,000 mcg Intramuscular Q7 Days Isac Reeder MD        And    [START ON 4/6/2021] cyanocobalamin injection 1,000 mcg  1,000 mcg Intramuscular Q30 Days Isac Reeder MD        oxyCODONE-acetaminophen (PERCOCET) 5-325 MG per tablet 1 tablet  1 tablet Oral Q6H PRN Jyoti Nicole MD   1 tablet at 02/16/21 0306    nicotine (NICODERM CQ) 21 MG/24HR 1 patch  1 patch Transdermal Daily Jyoti Nicole MD   1 patch at 02/17/21 1114    insulin lispro (HUMALOG) injection vial 0-6 Units  0-6 Units Subcutaneous TID WC Jyoti Nicole MD        insulin lispro (HUMALOG) injection vial 0-3 Units  0-3 Units Subcutaneous Nightly Jyoti Nicole MD        glucose (GLUTOSE) 40 % oral gel 15 g  15 g Oral PRN Jyoti Nicole MD        dextrose 50 % IV solution  12.5 g Intravenous PRN Jyoti Nicole MD        glucagon (rDNA) injection 1 mg  1 mg Intramuscular PRN Yan Zarco Eric Chandler MD        dextrose 5 % solution  100 mL/hr Intravenous PRN Nannette Murrell MD        0.9 % sodium chloride infusion   Intravenous Continuous Nannette Murrell  mL/hr at 02/17/21 2106 New Bag at 02/17/21 2106    ondansetron (ZOFRAN) injection 4 mg  4 mg Intravenous Once Elianaine MD Penny        sodium chloride flush 0.9 % injection 10 mL  10 mL Intravenous 2 times per day Nannette Murrell MD   10 mL at 02/17/21 2107    sodium chloride flush 0.9 % injection 10 mL  10 mL Intravenous PRN Nannette Murrell MD        potassium chloride (KLOR-CON M) extended release tablet 40 mEq  40 mEq Oral PRN Nannette Murrell MD        Or    potassium bicarb-citric acid (EFFER-K) effervescent tablet 40 mEq  40 mEq Oral PRN Nannette Murrell MD        Or    potassium chloride 10 mEq/100 mL IVPB (Peripheral Line)  10 mEq Intravenous PRN Nannette Murrell MD        promethazine (PHENERGAN) tablet 12.5 mg  12.5 mg Oral Q6H PRN Nannette Murrell MD   12.5 mg at 02/15/21 2116    Or    ondansetron (ZOFRAN) injection 4 mg  4 mg Intravenous Q6H PRN Nannette Murrell MD        magnesium hydroxide (MILK OF MAGNESIA) 400 MG/5ML suspension 30 mL  30 mL Oral Daily PRN Nannette Murrell MD        acetaminophen (TYLENOL) tablet 650 mg  650 mg Oral Q6H PRN Nannette Murrell MD        Or    acetaminophen (TYLENOL) suppository 650 mg  650 mg Rectal Q6H PRN Nannette Murrell MD        sennosides-docusate sodium (SENOKOT-S) 8.6-50 MG tablet 1 tablet  1 tablet Oral BID Opla Salguero MD   1 tablet at 02/17/21 2107    enoxaparin (LOVENOX) injection 40 mg  40 mg Subcutaneous Daily Opal Salguero MD   40 mg at 02/17/21 1109    lisinopril (PRINIVIL;ZESTRIL) tablet 20 mg  20 mg Oral Daily Nannette Murrell MD   20 mg at 02/17/21 1110    And    hydroCHLOROthiazide (HYDRODIURIL) tablet 25 mg  25 mg Oral Daily Nannette Murrell MD   25 mg at 02/17/21 1109         dextrose      sodium chloride 125 mL/hr at 02/17/21 2106      melatonin  5 mg Oral Nightly    cyanocobalamin  1,000 mcg Intramuscular Daily    multivitamin  1 tablet Oral Daily    thiamine  100 mg Intravenous Daily    folic acid  1 mg Oral Daily    [START ON 2/27/2021] cyanocobalamin  1,000 mcg Intramuscular Q7 Days    And    [START ON 4/6/2021] cyanocobalamin  1,000 mcg Intramuscular Q30 Days    nicotine  1 patch Transdermal Daily    insulin lispro  0-6 Units Subcutaneous TID     insulin lispro  0-3 Units Subcutaneous Nightly    ondansetron  4 mg Intravenous Once    sodium chloride flush  10 mL Intravenous 2 times per day    sennosides-docusate sodium  1 tablet Oral BID    enoxaparin  40 mg Subcutaneous Daily    lisinopril  20 mg Oral Daily    And    hydroCHLOROthiazide  25 mg Oral Daily     LORazepam, oxyCODONE-acetaminophen, glucose, dextrose, glucagon (rDNA), dextrose, sodium chloride flush, potassium chloride **OR** potassium alternative oral replacement **OR** potassium chloride, promethazine **OR** ondansetron, magnesium hydroxide, acetaminophen **OR** acetaminophen  DIET CARDIAC;       Labs:   CBC with DIFF:  Recent Labs     02/16/21 0214 02/17/21  0324   WBC 10.5 8.6   RBC 2.79* 2.80*   HGB 8.9* 8.9*   HCT 27.6* 28.7*   MCV 98.9* 102.5*   MCH 31.9* 31.8*   MCHC 32.2* 31.0*   RDW 12.3 12.1    150   MPV 11.1 11.0   NEUTOPHILPCT 80.8* 78.0*   LYMPHOPCT 12.2* 12.6*   MONOPCT 5.7 7.7   EOSRELPCT 0.7 1.0   BASOPCT 0.1 0.1   NEUTROABS 8.5* 6.7   LYMPHSABS 1.3 1.1   MONOSABS 0.60 0.70   EOSABS 0.10 0.10   BASOSABS 0.00 0.00       CMP/BMP:  Recent Labs     02/16/21  0214 02/16/21  1414 02/17/21  0324   *  --  134*   K 3.7 3.9 4.0     --  98   CO2 27  --  29   ANIONGAP 8  --  7   GLUCOSE 93  --  108   BUN 21  --  20   CREATININE 0.8  --  0.7   LABGLOM >60  --  >60   CALCIUM 8.2*  --  8.4*         CRP:  No results for input(s): CRP in the last 72 hours.   Sed Rate:  No results for input(s): SEDRATE in the last 72 hours. HgBA1c:  No components found for: HGBA1C  FLP:  No results found for: TRIG, HDL, LDLCALC, LDLDIRECT, LABVLDL  TSH:    Lab Results   Component Value Date    TSH 0.591 02/16/2021     Troponin T: No results for input(s): TROPONINI in the last 72 hours. Pro-BNP: No results for input(s): BNP in the last 72 hours. INR:   No results for input(s): INR in the last 72 hours. ABGs:   Lab Results   Component Value Date    PHART 7.430 02/16/2021    PO2ART 55.0 02/16/2021    UAO3PJD 35.0 02/16/2021     UA:  Recent Labs     02/15/21  1107   COLORU YELLOW   PHUR 5.0   CLARITYU Clear   SPECGRAV 1.018   LEUKOCYTESUR Negative   UROBILINOGEN 0.2   BILIRUBINUR Negative   BLOODU Negative   GLUCOSEU Negative         Culture Results:    No results for input(s): CXSURG in the last 720 hours. Blood Culture Recent:   Recent Labs     02/14/21  1632   BC No Growth to date. Any change in status will be called. Cultures:   No results for input(s): CULTURE in the last 72 hours. No results for input(s): BC, Rolm Damon in the last 72 hours. No results for input(s): CXSURG in the last 72 hours. No results for input(s): MG, PHOS in the last 72 hours. No results for input(s): AST, ALT, ALB, BILITOT, ALKPHOS, ALB in the last 72 hours. RAD:   Xr Femur Left (min 2 Views)    Result Date: 2/13/2021  EXAMINATION: XR FEMUR LEFT (MIN 2 VIEWS) 2/13/2021 3:21 PM HISTORY: Left hip fracture, open reduction internal fixation. Fluoroscopy time: 1 minute 3 seconds. Radiation dose: 0.2846 mGym2 Number fluoroscopic images acquired: 4. Report: 4 separate intraoperative fluoroscopic spot views were obtained under the supervision of the orthopedic surgery service, during open reduction internal fixation of the intratrochanteric left hip fracture, with satisfactory hardware positioning and alignment. Images are stored in PACS for review. Signed by Dr Rod Sun.  Bhavani on 2/13/2021 3:22 PM    Xr Femur Left (min 2 Views)    Result Date: by Dr Ivan Tapia. Antoniojovanni on 2/13/2021 12:16 PM        Objective:   Vitals:   BP (!) 164/82   Pulse 92   Temp 98.7 °F (37.1 °C)   Resp 18   Ht 6' (1.829 m)   Wt 195 lb (88.5 kg)   SpO2 91%   BMI 26.45 kg/m²       Patient Vitals for the past 24 hrs:   BP Temp Temp src Pulse Resp SpO2   02/18/21 0731 -- -- Temporal 92 18 91 %   02/18/21 0148 -- -- -- 98 -- --   02/17/21 1126 (!) 164/82 98.7 °F (37.1 °C) -- 98 17 96 %       24HR INTAKE/OUTPUT:      Intake/Output Summary (Last 24 hours) at 2/18/2021 0734  Last data filed at 2/18/2021 0442  Gross per 24 hour   Intake 2699.8 ml   Output 1300 ml   Net 1399.8 ml       Physical Exam  Vitals signs and nursing note reviewed. Constitutional:       General: He is not in acute distress. Appearance: Normal appearance. He is not ill-appearing, toxic-appearing or diaphoretic. HENT:      Head: Normocephalic and atraumatic. Right Ear: External ear normal.      Left Ear: External ear normal.      Nose: Nose normal. No congestion or rhinorrhea. Mouth/Throat:      Mouth: Mucous membranes are moist.      Pharynx: Oropharynx is clear. No oropharyngeal exudate or posterior oropharyngeal erythema. Eyes:      General: No scleral icterus. Right eye: No discharge. Left eye: No discharge. Extraocular Movements: Extraocular movements intact. Conjunctiva/sclera: Conjunctivae normal.   Neck:      Musculoskeletal: Normal range of motion and neck supple. No neck rigidity or muscular tenderness. Vascular: No carotid bruit. Cardiovascular:      Rate and Rhythm: Normal rate and regular rhythm. Pulses: Normal pulses. Heart sounds: Normal heart sounds. No murmur. No friction rub. No gallop. Pulmonary:      Effort: Pulmonary effort is normal. No respiratory distress. Breath sounds: Normal breath sounds. No stridor. No wheezing, rhonchi or rales. Chest:      Chest wall: No tenderness.    Abdominal:      General: Bowel sounds are normal. There is no distension. Palpations: Abdomen is soft. Tenderness: There is no abdominal tenderness. There is no guarding or rebound. Musculoskeletal:         General: No swelling, tenderness, deformity or signs of injury. Right lower leg: No edema. Left lower leg: No edema. Comments: Decreased ROM in left hip due to pain. Intact ROM in bilateral upper and right lower extremities. Skin:     General: Skin is warm and dry. Capillary Refill: Capillary refill takes less than 2 seconds. Coloration: Skin is not jaundiced or pale. Findings: No bruising, erythema, lesion or rash. Neurological:      General: No focal deficit present. Mental Status: He is alert. He is disoriented. Cranial Nerves: No cranial nerve deficit. Sensory: No sensory deficit. Motor: No weakness. Coordination: Coordination normal.           Assessment/plan:         Hospital Problems           Last Modified POA    * (Principal) Hip fracture requiring operative repair, left, closed, initial encounter ([facility] [address] 2/13/2021 Yes    Hyperglycemia 2/13/2021 Yes    Essential hypertension 2/16/2021 Yes    Fall from slipping on ice 2/13/2021 Yes    Altered mental state 2/16/2021 Yes    Smoker 2/16/2021 Yes    Pain in hip 2/17/2021 Yes    Gait abnormality 2/17/2021 Yes          Principal Problem:    Hip fracture requiring operative repair, left, closed, initial encounter [facility])  Active Problems:    Hyperglycemia    Essential hypertension    Fall from slipping on ice    Altered mental state    Smoker    Pain in hip    Gait abnormality  Resolved Problems:    * No resolved hospital problems. *      Brief Summary  Mr. Rhona Jenkins, a 67 y.o. male with a history of HTN, presenting to [facility] ED (02/13/2021) on account an acute onset of moderate to severe*left hip pain, after reported mechanical fall.     Patient reportedly slipped and fell onto the ice, landing on his left hip.  Patient reportedly unable to ambulate after the fall due to pain.      Nno other associated symptoms reported. No dizziness, lightheadedness or palpitation reported.     X-ray reported an acute mildly displaced closed intratrochanteric left hip  Fracture     Orthopedic surgery consulted from ED  Patient admitted for further work-up and management.        -ruled out  Altered mental status  Leukocytosis, with a left shift  · Likely delirium with superimposed ? Infectious etiology  · WBC of 10.6 on presentation (02/23/2021)  · Briefly worsened leukocytosis: WBC of 15.1--> 16.1 (02/14/2021), with a left shift  · Leukocytosis resolved  · Elevated procalcitonin level  · Initial lactic acid of 2.3 (02/14/2021)  · Repeat lactic acid of 1.1, after IV fluids  · Initially started on empiric antibiotics (vancomycin and cefepime)  · Blood cultures no growth to date  · Observing off antibiotic for now  · UA unremarkable  · Neurology following-appreciate recommendation    Altered mental status  Visual hallucinations-resolved  · Improved  · Continue Thiamine, multivitamin, folic acid p.o. daily  · Ammonia and TSH within lab reference range  · Neurology following-appreciate recommendations  · Status post EEG (02/17/2021): Impression: \"There is much movement and myogenic artifact seen throughout the recording. During the interpretable portions the background activity appears to be slightly slow. This finding is consistent with a mild diffisue disturbance in cerebral function. No clear epileptiform activity was noted during the recording period. \"  · MRI brain without contrast (02/18/2021): Impression: There is a 1 cm area of possible mildly restricted diffusion versus artifact at the right martha. No correlating abnormal signal on FLAIR. Elsa Jackson Moderate chronic microvascular changes and volume loss.     Vitamin B12 deficiency  · B12 level <150 [211 - 946 pg/mL]   · Cyanocobalamin 1000 mcg IM daily x5 days (start date: 02/16/2021), then,  · Cyanocobalamin 1000 mcg IM weekly, x4 doses (start date: 02/27/2021)  · Cyanocobalamin 1000 mcg IM monthly (start date 04/06/2021)      Acute mildly displaced closed intratrochanteric left hip  Fracture  · Status post mechanical fall from slipping on ice  · Orthopedics following  · Status post cephalomedullary nailing of left intratrochanteric hip fracture. (02/13/2021)  · Continue symptomatic and supportive management including pain management as needed. · PT OT       History of hypertension  · Continue home regimen  · Lisinopril-HCTZ 20-25 mg p.o. daily     Hyperglycemia  · No documented history of diabetes  · Hemoglobin A1c level: 5.6%  · However patient with persistent hyperglycemia  · Glucose of 161 (02/14/2021)  · Insulin as part on low-dose sliding scale  · Continue monitoring POC glucose           Continue management of other chronic medical conditions - see above and orders. Advance Directive: Full Code    DIET CARDIAC;         Consults Made:   IP CONSULT TO ORTHOPEDIC SURGERY  IP CONSULT TO SOCIAL WORK  PHARMACY TO DOSE VANCOMYCIN  IP CONSULT TO REHAB/TCU ADMISSION COORDINATOR  IP CONSULT TO NEUROLOGY    DVT prophylaxis: Enoxaparin      Discharge planning:   Observing off antibiotic  Neurology following for altered mental status  Family interested in SNF placement  Referrals sent, as per  awaiting approval/denial.      Time Spent is 25 mins in the examination, evaluation, counseling and review of medications, assessment and plan.      Electronically signed by   iGo Dwyer MD, MPH,   Internal Medicine Hospitalist   2/18/2021 7:34 AM

## 2021-02-18 NOTE — CARE COORDINATION
SW spoke with Pt spouse regarding SNF updates. [address] [place], [facility], [place] and [facility] have accepted Pt referral. SW discussed SNF options with Pt spouse at length. At the end of the conversation, Pt spouse states she wants to try and take Pt home. SW explained the safety concerns with her taking Pt home. Pt spouse explains she wants to try it at home before going to a SNF. SW went over the DME options which were discussed beforehand. Pt will need hospital bed to go home with and Pt spouse will need to make room before it can be delivered. SUZAN explained Pt could possibly DC on Friday February 19th. Pt will need home health orders along with DME for a hospital bed, wheelchair, wheelchair cushion & bedside commode.  .   Electronically signed by Daya Martin on 2/18/2021 at 4:42 PM

## 2021-02-19 LAB
ANION GAP SERPL CALCULATED.3IONS-SCNC: 10 MMOL/L (ref 7–19)
BASOPHILS ABSOLUTE: 0 K/UL (ref 0–0.2)
BASOPHILS RELATIVE PERCENT: 0.3 % (ref 0–1)
BLOOD CULTURE, ROUTINE: NORMAL
BUN BLDV-MCNC: 20 MG/DL (ref 8–23)
CALCIUM SERPL-MCNC: 8.4 MG/DL (ref 8.8–10.2)
CHLORIDE BLD-SCNC: 102 MMOL/L (ref 98–111)
CO2: 25 MMOL/L (ref 22–29)
CREAT SERPL-MCNC: 0.7 MG/DL (ref 0.5–1.2)
CULTURE, BLOOD 2: NORMAL
EOSINOPHILS ABSOLUTE: 0.2 K/UL (ref 0–0.6)
EOSINOPHILS RELATIVE PERCENT: 2.4 % (ref 0–5)
GFR AFRICAN AMERICAN: >59
GFR NON-AFRICAN AMERICAN: >60
GLUCOSE BLD-MCNC: 106 MG/DL (ref 74–109)
GLUCOSE BLD-MCNC: 112 MG/DL (ref 70–99)
GLUCOSE BLD-MCNC: 116 MG/DL (ref 70–99)
GLUCOSE BLD-MCNC: 201 MG/DL (ref 70–99)
HBA1C MFR BLD: 5.2 % (ref 4–6)
HCT VFR BLD CALC: 26.7 % (ref 42–52)
HEMOGLOBIN: 8.6 G/DL (ref 14–18)
IMMATURE GRANULOCYTES #: 0.1 K/UL
LYMPHOCYTES ABSOLUTE: 1.1 K/UL (ref 1.1–4.5)
LYMPHOCYTES RELATIVE PERCENT: 14.8 % (ref 20–40)
MCH RBC QN AUTO: 31.3 PG (ref 27–31)
MCHC RBC AUTO-ENTMCNC: 32.2 G/DL (ref 33–37)
MCV RBC AUTO: 97.1 FL (ref 80–94)
MONOCYTES ABSOLUTE: 0.6 K/UL (ref 0–0.9)
MONOCYTES RELATIVE PERCENT: 8.3 % (ref 0–10)
NEUTROPHILS ABSOLUTE: 5.6 K/UL (ref 1.5–7.5)
NEUTROPHILS RELATIVE PERCENT: 73.4 % (ref 50–65)
PDW BLD-RTO: 12.3 % (ref 11.5–14.5)
PERFORMED ON: ABNORMAL
PLATELET # BLD: 192 K/UL (ref 130–400)
PMV BLD AUTO: 10.7 FL (ref 9.4–12.4)
POTASSIUM REFLEX MAGNESIUM: 3.8 MMOL/L (ref 3.5–5)
RBC # BLD: 2.75 M/UL (ref 4.7–6.1)
SODIUM BLD-SCNC: 137 MMOL/L (ref 136–145)
WBC # BLD: 7.6 K/UL (ref 4.8–10.8)

## 2021-02-19 PROCEDURE — 6370000000 HC RX 637 (ALT 250 FOR IP): Performed by: ORTHOPAEDIC SURGERY

## 2021-02-19 PROCEDURE — 6370000000 HC RX 637 (ALT 250 FOR IP): Performed by: PSYCHIATRY & NEUROLOGY

## 2021-02-19 PROCEDURE — 97530 THERAPEUTIC ACTIVITIES: CPT

## 2021-02-19 PROCEDURE — 6360000002 HC RX W HCPCS: Performed by: ORTHOPAEDIC SURGERY

## 2021-02-19 PROCEDURE — 85025 COMPLETE CBC W/AUTO DIFF WBC: CPT

## 2021-02-19 PROCEDURE — 82947 ASSAY GLUCOSE BLOOD QUANT: CPT

## 2021-02-19 PROCEDURE — 2580000003 HC RX 258: Performed by: INTERNAL MEDICINE

## 2021-02-19 PROCEDURE — 83036 HEMOGLOBIN GLYCOSYLATED A1C: CPT

## 2021-02-19 PROCEDURE — 99233 SBSQ HOSP IP/OBS HIGH 50: CPT | Performed by: PSYCHIATRY & NEUROLOGY

## 2021-02-19 PROCEDURE — 97116 GAIT TRAINING THERAPY: CPT

## 2021-02-19 PROCEDURE — 36415 COLL VENOUS BLD VENIPUNCTURE: CPT

## 2021-02-19 PROCEDURE — 6360000002 HC RX W HCPCS: Performed by: PSYCHIATRY & NEUROLOGY

## 2021-02-19 PROCEDURE — 80048 BASIC METABOLIC PNL TOTAL CA: CPT

## 2021-02-19 PROCEDURE — 6370000000 HC RX 637 (ALT 250 FOR IP): Performed by: INTERNAL MEDICINE

## 2021-02-19 PROCEDURE — 1210000000 HC MED SURG R&B

## 2021-02-19 RX ORDER — ATORVASTATIN CALCIUM 20 MG/1
20 TABLET, FILM COATED ORAL NIGHTLY
Status: DISCONTINUED | OUTPATIENT
Start: 2021-02-19 | End: 2021-02-22 | Stop reason: HOSPADM

## 2021-02-19 RX ORDER — FOLIC ACID 1 MG/1
1 TABLET ORAL DAILY
Qty: 30 TABLET | Refills: 0 | Status: SHIPPED | OUTPATIENT
Start: 2021-02-20 | End: 2021-03-22

## 2021-02-19 RX ORDER — MULTIVITAMIN WITH IRON
1 TABLET ORAL DAILY
Qty: 30 TABLET | Refills: 0 | Status: SHIPPED | OUTPATIENT
Start: 2021-02-20 | End: 2021-03-22

## 2021-02-19 RX ORDER — ASPIRIN 81 MG/1
325 TABLET ORAL DAILY
Qty: 120 TABLET | Refills: 0 | Status: ON HOLD | OUTPATIENT
Start: 2021-02-19 | End: 2021-03-04 | Stop reason: HOSPADM

## 2021-02-19 RX ORDER — NICOTINE 21 MG/24HR
1 PATCH, TRANSDERMAL 24 HOURS TRANSDERMAL DAILY
Qty: 30 PATCH | Refills: 0 | Status: ON HOLD | OUTPATIENT
Start: 2021-02-20 | End: 2021-03-04 | Stop reason: HOSPADM

## 2021-02-19 RX ORDER — ATORVASTATIN CALCIUM 20 MG/1
20 TABLET, FILM COATED ORAL NIGHTLY
Qty: 30 TABLET | Refills: 0 | Status: ON HOLD | OUTPATIENT
Start: 2021-02-19 | End: 2021-03-04 | Stop reason: SDUPTHER

## 2021-02-19 RX ORDER — ASPIRIN 81 MG/1
81 TABLET ORAL DAILY
Status: DISCONTINUED | OUTPATIENT
Start: 2021-02-19 | End: 2021-02-22 | Stop reason: HOSPADM

## 2021-02-19 RX ORDER — OXYCODONE HYDROCHLORIDE AND ACETAMINOPHEN 5; 325 MG/1; MG/1
1 TABLET ORAL EVERY 6 HOURS PRN
Qty: 12 TABLET | Refills: 0 | Status: ON HOLD | OUTPATIENT
Start: 2021-02-19 | End: 2021-03-04 | Stop reason: HOSPADM

## 2021-02-19 RX ORDER — SENNA AND DOCUSATE SODIUM 50; 8.6 MG/1; MG/1
1 TABLET, FILM COATED ORAL 2 TIMES DAILY
Qty: 60 TABLET | Refills: 0 | Status: ON HOLD | OUTPATIENT
Start: 2021-02-19 | End: 2021-03-04 | Stop reason: HOSPADM

## 2021-02-19 RX ORDER — MECOBALAMIN 5000 MCG
5 TABLET,DISINTEGRATING ORAL NIGHTLY
Qty: 30 TABLET | Refills: 0 | Status: ON HOLD | OUTPATIENT
Start: 2021-02-19 | End: 2021-03-04 | Stop reason: SDUPTHER

## 2021-02-19 RX ADMIN — THIAMINE HYDROCHLORIDE 100 MG: 100 INJECTION, SOLUTION INTRAMUSCULAR; INTRAVENOUS at 10:06

## 2021-02-19 RX ADMIN — ATORVASTATIN CALCIUM 20 MG: 20 TABLET, FILM COATED ORAL at 22:04

## 2021-02-19 RX ADMIN — SODIUM CHLORIDE, PRESERVATIVE FREE 10 ML: 5 INJECTION INTRAVENOUS at 22:04

## 2021-02-19 RX ADMIN — Medication 5 MG: at 22:05

## 2021-02-19 RX ADMIN — THERA TABS 1 TABLET: TAB at 10:06

## 2021-02-19 RX ADMIN — FOLIC ACID 1 MG: 1 TABLET ORAL at 10:06

## 2021-02-19 RX ADMIN — LISINOPRIL 20 MG: 20 TABLET ORAL at 10:06

## 2021-02-19 RX ADMIN — ENOXAPARIN SODIUM 40 MG: 40 INJECTION SUBCUTANEOUS at 10:15

## 2021-02-19 RX ADMIN — HYDROCHLOROTHIAZIDE 25 MG: 25 TABLET ORAL at 10:06

## 2021-02-19 RX ADMIN — Medication 81 MG: at 15:05

## 2021-02-19 RX ADMIN — DOCUSATE SODIUM AND SENNOSIDES 1 TABLET: 8.6; 5 TABLET, FILM COATED ORAL at 22:04

## 2021-02-19 RX ADMIN — SODIUM CHLORIDE, PRESERVATIVE FREE 10 ML: 5 INJECTION INTRAVENOUS at 10:06

## 2021-02-19 RX ADMIN — DOCUSATE SODIUM AND SENNOSIDES 1 TABLET: 8.6; 5 TABLET, FILM COATED ORAL at 10:06

## 2021-02-19 RX ADMIN — TRAZODONE HYDROCHLORIDE 50 MG: 50 TABLET ORAL at 22:05

## 2021-02-19 RX ADMIN — CYANOCOBALAMIN 1000 MCG: 1000 INJECTION, SOLUTION INTRAMUSCULAR; SUBCUTANEOUS at 10:07

## 2021-02-19 ASSESSMENT — PAIN DESCRIPTION - LOCATION: LOCATION: HIP

## 2021-02-19 ASSESSMENT — PAIN SCALES - GENERAL: PAINLEVEL_OUTOF10: 6

## 2021-02-19 ASSESSMENT — PAIN DESCRIPTION - ORIENTATION: ORIENTATION: LEFT

## 2021-02-19 ASSESSMENT — PAIN DESCRIPTION - PROGRESSION: CLINICAL_PROGRESSION: NOT CHANGED

## 2021-02-19 ASSESSMENT — PAIN DESCRIPTION - PAIN TYPE: TYPE: SURGICAL PAIN

## 2021-02-19 ASSESSMENT — PAIN - FUNCTIONAL ASSESSMENT: PAIN_FUNCTIONAL_ASSESSMENT: PREVENTS OR INTERFERES SOME ACTIVE ACTIVITIES AND ADLS

## 2021-02-19 ASSESSMENT — PAIN DESCRIPTION - ONSET: ONSET: ON-GOING

## 2021-02-19 NOTE — CARE COORDINATION
HH referral received. Spoke with patient's wife, who states she has decided to send patient to SNF.   No further HH needs at this time Electronically signed by Ynes Dawn on 2/19/21 at 4:36 PM CST

## 2021-02-19 NOTE — PROGRESS NOTES
Patient:   Brook Escobar  MR#:    [id number]   Room:    [address] [place] [address]   YOB: 1945  Date of Progress Note: 2/19/2021  Time of Note                           11:55 AM  Consulting Physician:   Carlos Mcqueen M.D. Attending Physician:  Carloz French MD     Chief complaint AMS    S:This 67 y.o. male  with a past medical history significant for HTN and smoking is seen for altered mental status following left femur fracture and repair post fall on 2/13, 3 days prior to consultation. There was no apparent head injury and he was alert, appropriate and conversant following the injury. The patient underwent cephalomedullary nailing on 2/13 of the left femur and did well post operatively. On 2/14 there is report that patient was becoming confused and agitated but then the am of 2/15 he was better. The patient became confused with some agitation. This am nursing notes patient was yelling out and seeing bugs crawling on ceiling. Wants to go home. He is on some post op antibiotics for possible infections. Urine analysis and CXR have been unremarkable. His blood cultures have been negative so far. He is on a low dose of Percocet as well. He denies drinking alcohol in 40 years. He does smoke. His B12 was essentially undetectable (lower than the threshold reportable. His wife denies any significant cognitive issues at baseline. Still agitated at times and wanting to go home.       REVIEW OF SYSTEMS:  Not done    Past Medical History:      Diagnosis Date    Cerebral artery occlusion with cerebral infarction ([address]     tia    Hypertension        Past Surgical History:      Procedure Laterality Date    FEMUR FRACTURE SURGERY Left 2/13/2021    FEMUR IM NAIL GILDARDO INSERTION performed by Anya Mello MD at [facility] OR       Medications in Hospital:      Current Facility-Administered Medications:     traZODone (DESYREL) tablet 50 mg, 50 mg, Oral, Nightly PRN, Carlos Mcqueen MD    melatonin disintegrating tablet 5 mg, 5 mg, Oral, Nightly, Wilmer Winston MD, 5 mg at 02/18/21 2141    LORazepam (ATIVAN) injection 1 mg, 1 mg, Intravenous, Q4H PRN, Wilmer Winston MD, 1 mg at 02/16/21 2117    cyanocobalamin injection 1,000 mcg, 1,000 mcg, Intramuscular, Daily, Wilmer Winston MD, 1,000 mcg at 02/19/21 1007    multivitamin 1 tablet, 1 tablet, Oral, Daily, Wilmer Winston MD, 1 tablet at 02/19/21 1006    thiamine (B-1) injection 100 mg, 100 mg, Intravenous, Daily, Wilmer Winston MD, 100 mg at 72/34/96 8725    folic acid (FOLVITE) tablet 1 mg, 1 mg, Oral, Daily, Wilmer Winston MD, 1 mg at 02/19/21 1006    [START ON 2/27/2021] cyanocobalamin injection 1,000 mcg, 1,000 mcg, Intramuscular, Q7 Days **AND** [START ON 4/6/2021] cyanocobalamin injection 1,000 mcg, 1,000 mcg, Intramuscular, Q30 Days, Wilmer Winston MD    oxyCODONE-acetaminophen (PERCOCET) 5-325 MG per tablet 1 tablet, 1 tablet, Oral, Q6H PRN, Danny Hernandez MD, 1 tablet at 02/16/21 0306    nicotine (NICODERM CQ) 21 MG/24HR 1 patch, 1 patch, Transdermal, Daily, Danny Hernandez MD, 1 patch at 02/19/21 1006    insulin lispro (HUMALOG) injection vial 0-6 Units, 0-6 Units, Subcutaneous, TID WC, Danny Hernandez MD, 1 Units at 02/18/21 1230    insulin lispro (HUMALOG) injection vial 0-3 Units, 0-3 Units, Subcutaneous, Nightly, Danny Hernandez MD    glucose (GLUTOSE) 40 % oral gel 15 g, 15 g, Oral, PRN, Danny Hernandez MD    dextrose 50 % IV solution, 12.5 g, Intravenous, PRN, Danny Hernandez MD    glucagon (rDNA) injection 1 mg, 1 mg, Intramuscular, PRN, Danny Hernandez MD    dextrose 5 % solution, 100 mL/hr, Intravenous, PRN, Danny Hernandez MD    0.9 % sodium chloride infusion, , Intravenous, Continuous, Danny Hernandez MD, Last Rate: 125 mL/hr at 02/17/21 2106, New Bag at 02/17/21 2106    ondansetron (ZOFRAN) injection 4 mg, 4 mg, Intravenous, Once, Edna Wagoner MD    sodium chloride flush 0.9 % injection 10 mL, 10 mL, Intravenous, 2 times per day, Jalen Ricks MD, 10 mL at 02/19/21 1006    sodium chloride flush 0.9 % injection 10 mL, 10 mL, Intravenous, PRN, Jalen Ricks MD    potassium chloride (KLOR-CON M) extended release tablet 40 mEq, 40 mEq, Oral, PRN **OR** potassium bicarb-citric acid (EFFER-K) effervescent tablet 40 mEq, 40 mEq, Oral, PRN **OR** potassium chloride 10 mEq/100 mL IVPB (Peripheral Line), 10 mEq, Intravenous, PRN, Jalen Ricks MD    promethazine (PHENERGAN) tablet 12.5 mg, 12.5 mg, Oral, Q6H PRN, 12.5 mg at 02/15/21 2116 **OR** ondansetron (ZOFRAN) injection 4 mg, 4 mg, Intravenous, Q6H PRN, Jalen Ricks MD    magnesium hydroxide (MILK OF MAGNESIA) 400 MG/5ML suspension 30 mL, 30 mL, Oral, Daily PRN, Jalen Ricks MD    acetaminophen (TYLENOL) tablet 650 mg, 650 mg, Oral, Q6H PRN, 650 mg at 02/18/21 1101 **OR** acetaminophen (TYLENOL) suppository 650 mg, 650 mg, Rectal, Q6H PRN, Jalen Ricks MD    sennosides-docusate sodium (SENOKOT-S) 8.6-50 MG tablet 1 tablet, 1 tablet, Oral, BID, Marciano Stacy MD, 1 tablet at 02/19/21 1006    enoxaparin (LOVENOX) injection 40 mg, 40 mg, Subcutaneous, Daily, Marciano Stacy MD, 40 mg at 02/19/21 1015    lisinopril (PRINIVIL;ZESTRIL) tablet 20 mg, 20 mg, Oral, Daily, 20 mg at 02/19/21 1006 **AND** hydroCHLOROthiazide (HYDRODIURIL) tablet 25 mg, 25 mg, Oral, Daily, Jalen Ricks MD, 25 mg at 02/19/21 1006    Allergies:  Patient has no known allergies. Social History:   TOBACCO:   reports that he has been smoking. He has been smoking about 1.00 pack per day. He has never used smokeless tobacco.  ETOH:   reports no history of alcohol use. Family History:   History reviewed. No pertinent family history.       PHYSICAL EXAM:  BP (!) 151/69   Pulse 100   Temp 98.4 °F (36.9 °C) (Temporal)   Resp 18   Ht 6' (1.829 m)   Wt 195 lb (88.5 kg)   SpO2 91%   BMI 26.45 kg/m²     Constitutional - well developed, well nourished. Eyes - conjunctiva normal.   Ear, nose, throat - No scars, masses, or lesions over external nose or ears, no atrophy of tongue  Neck-symmetric, no masses noted, no jugular vein distension  Respiration- chest wall appears symmetric, good expansion,   normal effort without use of accessory muscles  Musculoskeletal - no significant wasting of muscles noted, no bony deformities  Extremities-no clubbing, cyanosis or edema  Skin - warm, dry, and intact. No rash, erythema, or pallor. Psychiatric - mood, affect, and behavior restless     Neurological exam  Awake, alert oriented x 2 follows commands but wants to go home  Fluent   Attention and concentration appear impaired  Recent and remote memory appears impaired  Speech no dysarthria  No clear issues with language of fund of knowledge     Cranial Nerve Exam     CN III, IV,VI-EOMI, No nystagmus, conjugate eye movements, no ptosis    CN VII-no facial assymetry       Motor Exam  antigravity throughout upper extremities bilaterally      Tremors- no tremors in hands or head noted     Gait  Not tested    Nursing/pcp notes, imaging,labs and vitals reviewed.      PT,OT and/or speech notes reviewed    Lab Results   Component Value Date    WBC 7.6 02/19/2021    HGB 8.6 (L) 02/19/2021    HCT 26.7 (L) 02/19/2021    MCV 97.1 (H) 02/19/2021     02/19/2021     Lab Results   Component Value Date     02/19/2021    K 3.8 02/19/2021     02/19/2021    CO2 25 02/19/2021    BUN 20 02/19/2021    CREATININE 0.7 02/19/2021    GLUCOSE 106 02/19/2021    CALCIUM 8.4 (L) 02/19/2021    PROT 6.7 02/14/2021    LABALBU 3.5 02/14/2021    BILITOT 0.6 02/14/2021    ALKPHOS 49 02/14/2021    AST 18 02/14/2021    ALT 8 02/14/2021    LABGLOM >60 02/19/2021    GFRAA >59 02/19/2021     Lab Results   Component Value Date    INR 1.07 02/13/2021    PROTIME 13.9 02/13/2021     EEG awake and asleep portable [[id number]]    Resulted: 02/17/21 0811    Updated: 02/17/21 0813    Narrative: Hanh Carias MD     2/17/2021  8:13 AM   Patient:   Jesse Rodriguez   MR#:    [id number]   Room:    [facility]/658-   Date of Birth:   1945   Date of Progress Note: 2/17/2021   Time of Note                           8:11 AM   Consulting Physician:   Antonina Mancilla M.D. Attending Physician: Selena Perez MD           This is a multichannel digital EEG recording using the   international 10-20 placement system. Technical Summary:     Background EEG activity: The occipital dominant rhythm is 6-7 Hz. There is much low to moderate voltage 5-7 Hz activity seen in a   generalized distribution intermixed with low voltage 18-22 Hz   activity. There is moderate voltage 1.5-3 Hz activity   intermittently. Photic Stimulation: No abnormal waveforms are noted with various   frequencies of flickering light. IMPRESSION:   There is much movement and myogenic artifact seen throughout the   recording. During the interpretable portions the background   activity appears to be slightly slow. This finding is consistent   with a mild diffisue disturbance in cerebral function. No clear   epileptiform activity was noted during the recording period.        Dr Altman Loop Certified in Neurology   Board Certified in [place] [address] Neurophysiology   Fellowship trained in [place] [address] Neurophysiology              RECORD REVIEW: Previous medical records, medications were reviewed at today's visit    IMPRESSION:   Altered mental status-post op more alert but still confused and wanting to go home     Initial Exam  Patient is oriented but agitated insisting he wants to go home  Does not want to cooperate with exam but except for left hip, grossly non focal  CT head no acute changes-chronic small vessel disease/atrophy with associated ventricular prominence  Patient's wife denies any dementia at baseline        B12 essentially undetectable  Start B12 injections  Place IV thiamine/MVI and Folic acid   TSH/ammonia ok     EEG significant movement artifact but appears to be slightly slow     Denies any history of ETOH use     Smoker-Abg with evidence of hypoxia     Antibiotics stopped     HTN-on meds monitor      Post op left femur fracture and repair- minimize pain meds as able     DVT prophylaxis-Lovenox     PT/OT    MRI brain with small area of restriction diffusion right martha consistent with a small brainstem ischemic stroke-patient's complaints of double vision and veering to one side reported- doubt this is related to patient's confusion which appears more of an encephalopathy    Will start a baby aspirin      To low level for acute inpatient rehab     Try Trazodone at night to assist with sleep    DW wife    [id number] Jessica English to DC from neuro standpoint     [address] [place] [address]  [phone] CELL  Dr Deepak Khan made 2/22/21above regarding patient's diagnosis of stroke

## 2021-02-19 NOTE — PROGRESS NOTES
door)    Activity Tolerance   Activity Tolerance Patient limited by cognitive status; Patient limited by pain; Patient limited by endurance   Safety Devices   Type of devices Left in chair;Call light within reach; Chair alarm in place       Electronically signed by Christine Brito PTA on 2/19/2021 at 12:23 PM
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
## 2021-02-19 NOTE — PROGRESS NOTES
[facility] Progress Note    Patient:  Hunter Calderon  YOB: 1945  Date of Service: 2/19/2021  MRN: [id number]   Acct: [de-identified]   Primary Care Physician: Bev Vasquez DO  Advance Directive: Full Code  Admit Date: 2/13/2021       Hospital Day: 6      CHIEF COMPLAINT:    Chief Complaint   Patient presents with    Fall    Hip Injury       2/19/2021 8:56 AM  Subjective / Interval History:   02/19/2021  Patient doing well. Laying comfortably in bed no acute distress. Family at bedside. Reportedly with intermittent confusion. No acute changes or acute overnight event reported. Patient continues to ask to go home. 02/18/2021  Patient seen and examined this AM.  Doing well. Reportedly with some visual hallucinations this a.m. Patient reportedly was noted to be yelling and saying he was seeing bugs crawling on the ceiling. No acute changes or acute overnight event reported. Mental status improved. Family at bedside. 02/17/2021  Patient seen and examined this AM.  Doing well. Family at bedside. Continues to have intermittent confusion. Laying comfortably in bed in no acute distress. No acute changes or acute overnight event reported. 02/16/2021  Patient seen and examined this AM.  Continues to be agitated, confused and with intermittent combativeness. No other acute changes or acute overnight event reported. Patient endorses fairly controlled pain. 02/15/2021  Patient with reported acute change in mental status with some agitation and delirium yesterday. Patient seen and examined this AM.  No new complaints. Mental status improved. Laying comfortably in bed in no acute distress. No other acute changes or acute overnight event reported. Left hip pain fairly controlled. 02/14/2021  Patient seen and examined this AM.  Doing well. No new complaints. Left hip pain fairly controlled. Laying comfortably in bed in no acute distress.   No acute changes or acute overnight event reported. Status post cephalomedullary nailing of left hip yesterday. Doing well postop. Review of Systems:   Review of Systems   Unable to perform ROS: Mental status change         DIET CARDIAC;     Intake/Output Summary (Last 24 hours) at 2/19/2021 0856  Last data filed at 2/19/2021 0331  Gross per 24 hour   Intake 1496 ml   Output 1650 ml   Net -154 ml       Medications:   dextrose      sodium chloride 125 mL/hr at 02/17/21 2106     Current Facility-Administered Medications   Medication Dose Route Frequency Provider Last Rate Last Admin    traZODone (DESYREL) tablet 50 mg  50 mg Oral Nightly PRN Antonina Mancilla MD        melatonin disintegrating tablet 5 mg  5 mg Oral Nightly Antonina Mancilla MD   5 mg at 02/18/21 2141    LORazepam (ATIVAN) injection 1 mg  1 mg Intravenous Q4H PRN Antonina Mancilla MD   1 mg at 02/16/21 2117    cyanocobalamin injection 1,000 mcg  1,000 mcg Intramuscular Daily Antonina Mancilla MD   1,000 mcg at 02/18/21 0924    multivitamin 1 tablet  1 tablet Oral Daily Antonina Mancilla MD   1 tablet at 02/18/21 0925    thiamine (B-1) injection 100 mg  100 mg Intravenous Daily Antonina Mancilla MD   100 mg at 72/43/13 7895    folic acid (FOLVITE) tablet 1 mg  1 mg Oral Daily Antonina Mancilla MD   1 mg at 02/18/21 0925    [START ON 2/27/2021] cyanocobalamin injection 1,000 mcg  1,000 mcg Intramuscular Q7 Days Antonina Mancilla MD        And    [START ON 4/6/2021] cyanocobalamin injection 1,000 mcg  1,000 mcg Intramuscular Q30 Days Antonina Mancilla MD        oxyCODONE-acetaminophen (PERCOCET) 5-325 MG per tablet 1 tablet  1 tablet Oral Q6H PRN Selena Perez MD   1 tablet at 02/16/21 0306    nicotine (NICODERM CQ) 21 MG/24HR 1 patch  1 patch Transdermal Daily Selena Perez MD   1 patch at 02/18/21 0925    insulin lispro (HUMALOG) injection vial 0-6 Units  0-6 Units Subcutaneous TID  Seelna Perez MD   1 Units at 02/18/21 1230    insulin lispro (HUMALOG) Subcutaneous Daily Lindsey Banerjee MD   40 mg at 02/18/21 4850    lisinopril (PRINIVIL;ZESTRIL) tablet 20 mg  20 mg Oral Daily Pedro Khan MD   20 mg at 02/18/21 2534    And    hydroCHLOROthiazide (HYDRODIURIL) tablet 25 mg  25 mg Oral Daily Pedro Khan MD   25 mg at 02/18/21 1270         dextrose      sodium chloride 125 mL/hr at 02/17/21 2106      melatonin  5 mg Oral Nightly    cyanocobalamin  1,000 mcg Intramuscular Daily    multivitamin  1 tablet Oral Daily    thiamine  100 mg Intravenous Daily    folic acid  1 mg Oral Daily    [START ON 2/27/2021] cyanocobalamin  1,000 mcg Intramuscular Q7 Days    And    [START ON 4/6/2021] cyanocobalamin  1,000 mcg Intramuscular Q30 Days    nicotine  1 patch Transdermal Daily    insulin lispro  0-6 Units Subcutaneous TID WC    insulin lispro  0-3 Units Subcutaneous Nightly    ondansetron  4 mg Intravenous Once    sodium chloride flush  10 mL Intravenous 2 times per day    sennosides-docusate sodium  1 tablet Oral BID    enoxaparin  40 mg Subcutaneous Daily    lisinopril  20 mg Oral Daily    And    hydroCHLOROthiazide  25 mg Oral Daily     traZODone, LORazepam, oxyCODONE-acetaminophen, glucose, dextrose, glucagon (rDNA), dextrose, sodium chloride flush, potassium chloride **OR** potassium alternative oral replacement **OR** potassium chloride, promethazine **OR** ondansetron, magnesium hydroxide, acetaminophen **OR** acetaminophen  DIET CARDIAC;       Labs:   CBC with DIFF:  Recent Labs     02/17/21  0324 02/18/21  0617 02/19/21  0426   WBC 8.6 7.8 7.6   RBC 2.80* 2.65* 2.75*   HGB 8.9* 8.5* 8.6*   HCT 28.7* 25.3* 26.7*   .5* 95.5* 97.1*   MCH 31.8* 32.1* 31.3*   MCHC 31.0* 33.6 32.2*   RDW 12.1 12.2 12.3    169 192   MPV 11.0 10.7 10.7   NEUTOPHILPCT 78.0* 75.0* 73.4*   LYMPHOPCT 12.6* 14.1* 14.8*   MONOPCT 7.7 8.2 8.3   EOSRELPCT 1.0 1.8 2.4   BASOPCT 0.1 0.3 0.3   NEUTROABS 6.7 5.8 5.6   LYMPHSABS 1.1 1.1 1.1   MONOSABS 0.70 Fluoroscopy time: 1 minute 3 seconds. Radiation dose: 0.2846 mGym2 Number fluoroscopic images acquired: 4. Report: 4 separate intraoperative fluoroscopic spot views were obtained under the supervision of the orthopedic surgery service, during open reduction internal fixation of the intratrochanteric left hip fracture, with satisfactory hardware positioning and alignment. Images are stored in PACS for review. Signed by Dr Rod Beckham on 2/13/2021 3:22 PM    Xr Femur Left (min 2 Views)    Result Date: 2/13/2021  EXAMINATION: XR FEMUR LEFT (MIN 2 VIEWS) 2/13/2021 1:38 PM HISTORY: Fall, acute left hip fracture. Report: 3 views of the left femur were obtained. COMPARISON: X-rays of the pelvis left hip from the same day. There is acute vertically oriented closed fracture within the intertrochanteric aspect of the left hip, with mild lateral displacement and no involvement of the femoral head. The mid shaft and distal femur are unremarkable. There is soft tissue swelling in the region of the fracture. Acute closed vertically oriented intertrochanteric left hip fracture with mild lateral displacement. The mid shaft and distal left femur are unremarkable. Signed by Dr Rod Sun. Bhavani on 2/13/2021 1:39 PM    Xr Chest Portable    Result Date: 2/13/2021  EXAMINATION: XR CHEST PORTABLE 2/13/2021 12:16 PM HISTORY: Fall, pain. Report: A portable frontal view of the chest was obtained. COMPARISON: There are no correlative imaging studies for comparison. The lungs are clear, well expanded. Heart size is normal. There is mild ectasia of the thoracic aorta. No pneumothorax or significant pleural effusion is identified. The osseous structures and upper abdomen are unremarkable. No acute cardiopulmonary abnormality. Signed by Dr Rod Beckham on 2/13/2021 12:18 PM    Xr Hip 2-3 Vw W Pelvis Left    Result Date: 2/13/2021  EXAMINATION: XR HIP 2-3 VW W PELVIS LEFT 2/13/2021 12:14 PM HISTORY: Fall, left hip pain.  Report: An AP view the pelvis was obtained as well as AP and crosstable lateral views of the left hip. COMPARISON: There are no correlative imaging studies for comparison. The right hip is unremarkable. There is an acute  intratrochanteric left hip fracture oriented vertically, with mild lateral displacement, no significant angulation or involvement of the femoral head. The other pelvic osseous structures appear unremarkable. Acute mildly displaced closed intratrochanteric left hip fracture as described. Signed by Dr Gris Price. Bhavani on 2/13/2021 12:16 PM        Objective:   Vitals:   BP (!) 180/87   Pulse 89   Temp 98.4 °F (36.9 °C) (Temporal)   Resp 16   Ht 6' (1.829 m)   Wt 195 lb (88.5 kg)   SpO2 92%   BMI 26.45 kg/m²       Patient Vitals for the past 24 hrs:   BP Temp Temp src Pulse Resp SpO2   02/19/21 0331 (!) 180/87 98.4 °F (36.9 °C) Temporal 89 16 92 %   02/18/21 1927 (!) 158/84 99.9 °F (37.7 °C) Temporal 87 16 90 %   02/18/21 1431 -- -- Temporal 90 18 90 %   02/18/21 1120 (!) 179/86 98.6 °F (37 °C) Temporal 88 18 90 %       24HR INTAKE/OUTPUT:      Intake/Output Summary (Last 24 hours) at 2/19/2021 0856  Last data filed at 2/19/2021 0331  Gross per 24 hour   Intake 1496 ml   Output 1650 ml   Net -154 ml       Physical Exam  Vitals signs and nursing note reviewed. Constitutional:       General: He is not in acute distress. Appearance: Normal appearance. He is not ill-appearing, toxic-appearing or diaphoretic. HENT:      Head: Normocephalic and atraumatic. Right Ear: External ear normal.      Left Ear: External ear normal.      Nose: Nose normal. No congestion or rhinorrhea. Mouth/Throat:      Mouth: Mucous membranes are moist.      Pharynx: Oropharynx is clear. No oropharyngeal exudate or posterior oropharyngeal erythema. Eyes:      General: No scleral icterus. Right eye: No discharge. Left eye: No discharge. Extraocular Movements: Extraocular movements intact. Conjunctiva/sclera: Conjunctivae normal.   Neck:      Musculoskeletal: Normal range of motion and neck supple. No neck rigidity or muscular tenderness. Vascular: No carotid bruit. Cardiovascular:      Rate and Rhythm: Normal rate and regular rhythm. Pulses: Normal pulses. Heart sounds: Normal heart sounds. No murmur. No friction rub. No gallop. Pulmonary:      Effort: Pulmonary effort is normal. No respiratory distress. Breath sounds: Normal breath sounds. No stridor. No wheezing, rhonchi or rales. Chest:      Chest wall: No tenderness. Abdominal:      General: Bowel sounds are normal. There is no distension. Palpations: Abdomen is soft. Tenderness: There is no abdominal tenderness. There is no guarding or rebound. Musculoskeletal:         General: No swelling, tenderness, deformity or signs of injury. Right lower leg: No edema. Left lower leg: No edema. Comments: Decreased ROM in left hip due to pain. Intact ROM in bilateral upper and right lower extremities. Skin:     General: Skin is warm and dry. Capillary Refill: Capillary refill takes less than 2 seconds. Coloration: Skin is not jaundiced or pale. Findings: No bruising, erythema, lesion or rash. Neurological:      General: No focal deficit present. Mental Status: He is alert. He is disoriented. Cranial Nerves: No cranial nerve deficit. Sensory: No sensory deficit. Motor: No weakness.       Coordination: Coordination normal.           Assessment/plan:         Hospital Problems           Last Modified POA    * (Principal) Hip fracture requiring operative repair, left, closed, initial encounter ([facility] [address] 2/13/2021 Yes    Hyperglycemia 2/13/2021 Yes    Essential hypertension 2/16/2021 Yes    Fall from slipping on ice 2/13/2021 Yes    Altered mental state 2/16/2021 Yes    Smoker 2/16/2021 Yes    Pain in hip 2/17/2021 Yes    Gait abnormality 2/17/2021 Yes          Principal Problem:    Hip fracture requiring operative repair, left, closed, initial encounter [facility])  Active Problems:    Hyperglycemia    Essential hypertension    Fall from slipping on ice    Altered mental state    Smoker    Pain in hip    Gait abnormality  Resolved Problems:    * No resolved hospital problems. *      Brief Summary  Mr. Shelley Mcfarlane, a 67 y.o. male with a history of HTN, presenting to [facility] ED (02/13/2021) on account an acute onset of moderate to severe*left hip pain, after reported mechanical fall.     Patient reportedly slipped and fell onto the ice, landing on his left hip. Patient reportedly unable to ambulate after the fall due to pain.      Nno other associated symptoms reported. No dizziness, lightheadedness or palpitation reported.     X-ray reported an acute mildly displaced closed intratrochanteric left hip  Fracture     Orthopedic surgery consulted from ED  Patient admitted for further work-up and management.        -ruled out  Altered mental status  Leukocytosis, with a left shift  · Likely delirium with superimposed ? Infectious etiology  · WBC of 10.6 on presentation (02/23/2021)  · Briefly worsened leukocytosis: WBC of 15.1--> 16.1 (02/14/2021), with a left shift  · Leukocytosis resolved  · Elevated procalcitonin level  · Initial lactic acid of 2.3 (02/14/2021)  · Repeat lactic acid of 1.1, after IV fluids  · Initially started on empiric antibiotics (vancomycin and cefepime)  · Blood cultures no growth to date  · Observing off antibiotic for now  · UA unremarkable  · Neurology following-appreciate recommendation    Altered mental status  Visual hallucinations-resolved  · Improved  · Continue Thiamine, multivitamin, folic acid p.o. daily  · Ammonia and TSH within lab reference range  · Neurology following-appreciate recommendations  · Status post EEG (02/17/2021): Impression: \"There is much movement and myogenic artifact seen throughout the recording.  During the interpretable portions the background activity appears to be slightly slow. This finding is consistent with a mild diffisue disturbance in cerebral function. No clear epileptiform activity was noted during the recording period. \"  · MRI brain without contrast (02/18/2021): Impression: There is a 1 cm area of possible mildly restricted diffusion versus artifact at the right martha. No correlating abnormal signal on FLAIR. [place] Moderate chronic microvascular changes and volume loss. · Further management as per neurology recommendation. Vitamin B12 deficiency  · B12 level <150 [211 - 946 pg/mL]   · Cyanocobalamin 1000 mcg IM daily x5 days (start date: 02/16/2021), then,  · Cyanocobalamin 1000 mcg IM weekly, x4 doses (start date: 02/27/2021)  · Cyanocobalamin 1000 mcg IM monthly (start date 04/06/2021)      Acute mildly displaced closed intratrochanteric left hip  Fracture  · Status post mechanical fall from slipping on ice  · Followed by Orthopedics surgery  · Status post cephalomedullary nailing of left intratrochanteric hip fracture. (02/13/2021)  · Continue symptomatic and supportive management including pain management as needed. · PT OT       History of hypertension  · Continue home regimen  · Lisinopril-HCTZ 20-25 mg p.o. daily     Hyperglycemia  · No documented history of diabetes  · Hemoglobin A1c level: 5.6%  · However patient with persistent hyperglycemia  · Glucose of 161 (02/14/2021)  · Insulin as part on low-dose sliding scale  · Continue monitoring POC glucose           Continue management of other chronic medical conditions - see above and orders.             Advance Directive: Full Code    DIET CARDIAC;         Consults Made:   IP CONSULT TO ORTHOPEDIC SURGERY  IP CONSULT TO SOCIAL WORK  PHARMACY TO DOSE VANCOMYCIN  IP CONSULT TO REHAB/TCU ADMISSION COORDINATOR  IP CONSULT TO NEUROLOGY    DVT prophylaxis: Enoxaparin      Discharge planning:   Neurology following for altered mental status  Evaluated by inpatient acute rehab and patient deemed not eligible. Family interested in SNF placement  Referrals sent, as per  awaiting approval/denial.      Time Spent is 25 mins in the examination, evaluation, counseling and review of medications, assessment and plan.      Electronically signed by   Gibson Stark MD, MPH,   Internal Medicine Hospitalist   2/19/2021 8:56 AM

## 2021-02-19 NOTE — CARE COORDINATION
Pt and spouse have now agreed to be listed for short term rehab placement again.  SW will reach back out to [facility] on Monday February 22nd and see which ones are able to accept referral.   Electronically signed by Katlyn Vieyra on 2/19/2021 at 4:40 PM

## 2021-02-19 NOTE — PROGRESS NOTES
Occupational Therapy  Facility/Department: [facility] SURG SERVICES  Daily Treatment Note  NAME: Jessica Mcmahon  : 1945  MRN: [id number]    Date of Service: 2021    Discharge Recommendations:  Patient would benefit from continued therapy after discharge       Assessment   Assessment: Pt. unrealistic in his abilities. Adamant about leaving room (\"im going to the bank open the door! \") and became visibly agitated when told he could not leave the room. Pt. requires at a minimum Mod A of 1 and often a 2nd to move with a RW. Will need to go somewhere for extended therapy. Patient Diagnosis(es): The primary encounter diagnosis was Closed fracture of left hip, initial encounter ([facility] [address] Diagnoses of Fall due to slipping on ice or snow, initial encounter and Essential hypertension were also pertinent to this visit. has a past medical history of Cerebral artery occlusion with cerebral infarction ([facility] [address] and Hypertension. has a past surgical history that includes Femur fracture surgery (Left, 2021). Restrictions  Restrictions/Precautions  Restrictions/Precautions: Fall Risk, Weight Bearing  Required Braces or Orthoses?: No  Lower Extremity Weight Bearing Restrictions  Left Lower Extremity Weight Bearing: Weight Bearing As Tolerated  Subjective   General  Chart Reviewed: Yes  Patient assessed for rehabilitation services?: Yes  Response to previous treatment: Patient with no complaints from previous session  Family / Caregiver Present: No      Orientation     Objective                Bed mobility  Supine to Sit: Moderate assistance  Transfers  Stand Step Transfers: Moderate assistance;2 Person assistance  Sit to stand:  Moderate assistance(Pt. did stand 2nd time from recliner with Mod A by using BUE to push)  Transfer Comments: Pt. unable to advance LLE in standing without physical assist.                                                                 Plan   Plan  Times per week: 3-5 days/wk.   G-Code     OutComes Score                                                  AM-PAC Score             Goals  Short term goals  Short term goal 1: Min A for dressing  Short term goal 2: Min A for toileting  Short term goal 3: Min A for transfers  Long term goals  Long term goal 1: Upgrade as tolerated       Therapy Time   Individual Concurrent Group Co-treatment   Time In           Time Out           Minutes                   Yvonne Franklin, OT

## 2021-02-19 NOTE — PROGRESS NOTES
Nutrition Assessment     Type and Reason for Visit: Initial    Nutrition Assessment:  LOS Day 6. Pt has a good appetite and is eating food that spouse brings in for him. I have liberalized his diet to General to provide more options of our foods that he may like. No preferences of food or beverage given by pt at this time. He is ready to go home.     Nutrition Interventions:   Food and/or Nutrient Delivery:  Modify Current Diet  Coordination of Nutrition Care:  Continue to monitor while inpatient       Nutrition Monitoring and Evaluation:   Physical Signs/Symptoms Outcomes:  Biochemical Data, Nutrition Focused Physical Findings, Weight     Electronically signed by Jaspreet Mora, MS, RD, LD on 2/19/21 at 2:38 PM CST    Contact: [phone]

## 2021-02-20 LAB
ANION GAP SERPL CALCULATED.3IONS-SCNC: 11 MMOL/L (ref 7–19)
BASOPHILS ABSOLUTE: 0 K/UL (ref 0–0.2)
BASOPHILS RELATIVE PERCENT: 0.2 % (ref 0–1)
BUN BLDV-MCNC: 31 MG/DL (ref 8–23)
CALCIUM SERPL-MCNC: 8.9 MG/DL (ref 8.8–10.2)
CHLORIDE BLD-SCNC: 99 MMOL/L (ref 98–111)
CHOLESTEROL, TOTAL: 130 MG/DL (ref 160–199)
CO2: 27 MMOL/L (ref 22–29)
CREAT SERPL-MCNC: 0.8 MG/DL (ref 0.5–1.2)
EOSINOPHILS ABSOLUTE: 0.1 K/UL (ref 0–0.6)
EOSINOPHILS RELATIVE PERCENT: 0.6 % (ref 0–5)
GFR AFRICAN AMERICAN: >59
GFR NON-AFRICAN AMERICAN: >60
GLUCOSE BLD-MCNC: 116 MG/DL (ref 70–99)
GLUCOSE BLD-MCNC: 130 MG/DL (ref 74–109)
GLUCOSE BLD-MCNC: 131 MG/DL (ref 70–99)
GLUCOSE BLD-MCNC: 160 MG/DL (ref 70–99)
HCT VFR BLD CALC: 29.7 % (ref 42–52)
HDLC SERPL-MCNC: 29 MG/DL (ref 55–121)
HEMOGLOBIN: 9.5 G/DL (ref 14–18)
IMMATURE GRANULOCYTES #: 0.1 K/UL
LDL CHOLESTEROL CALCULATED: 80 MG/DL
LYMPHOCYTES ABSOLUTE: 1.1 K/UL (ref 1.1–4.5)
LYMPHOCYTES RELATIVE PERCENT: 11.3 % (ref 20–40)
MCH RBC QN AUTO: 31.6 PG (ref 27–31)
MCHC RBC AUTO-ENTMCNC: 32 G/DL (ref 33–37)
MCV RBC AUTO: 98.7 FL (ref 80–94)
MONOCYTES ABSOLUTE: 0.7 K/UL (ref 0–0.9)
MONOCYTES RELATIVE PERCENT: 7.3 % (ref 0–10)
NEUTROPHILS ABSOLUTE: 8 K/UL (ref 1.5–7.5)
NEUTROPHILS RELATIVE PERCENT: 79.3 % (ref 50–65)
PDW BLD-RTO: 12.6 % (ref 11.5–14.5)
PERFORMED ON: ABNORMAL
PLATELET # BLD: 240 K/UL (ref 130–400)
PMV BLD AUTO: 10.8 FL (ref 9.4–12.4)
POTASSIUM REFLEX MAGNESIUM: 3.8 MMOL/L (ref 3.5–5)
RBC # BLD: 3.01 M/UL (ref 4.7–6.1)
SODIUM BLD-SCNC: 137 MMOL/L (ref 136–145)
TRIGL SERPL-MCNC: 104 MG/DL (ref 0–149)
WBC # BLD: 10 K/UL (ref 4.8–10.8)

## 2021-02-20 PROCEDURE — 6360000002 HC RX W HCPCS: Performed by: PSYCHIATRY & NEUROLOGY

## 2021-02-20 PROCEDURE — 85025 COMPLETE CBC W/AUTO DIFF WBC: CPT

## 2021-02-20 PROCEDURE — 80048 BASIC METABOLIC PNL TOTAL CA: CPT

## 2021-02-20 PROCEDURE — 36415 COLL VENOUS BLD VENIPUNCTURE: CPT

## 2021-02-20 PROCEDURE — 2580000003 HC RX 258: Performed by: INTERNAL MEDICINE

## 2021-02-20 PROCEDURE — 82947 ASSAY GLUCOSE BLOOD QUANT: CPT

## 2021-02-20 PROCEDURE — 97116 GAIT TRAINING THERAPY: CPT

## 2021-02-20 PROCEDURE — 6360000002 HC RX W HCPCS: Performed by: ORTHOPAEDIC SURGERY

## 2021-02-20 PROCEDURE — 6370000000 HC RX 637 (ALT 250 FOR IP): Performed by: PSYCHIATRY & NEUROLOGY

## 2021-02-20 PROCEDURE — 6370000000 HC RX 637 (ALT 250 FOR IP): Performed by: ORTHOPAEDIC SURGERY

## 2021-02-20 PROCEDURE — 1210000000 HC MED SURG R&B

## 2021-02-20 PROCEDURE — 80061 LIPID PANEL: CPT

## 2021-02-20 PROCEDURE — 6370000000 HC RX 637 (ALT 250 FOR IP): Performed by: INTERNAL MEDICINE

## 2021-02-20 RX ADMIN — LISINOPRIL 20 MG: 20 TABLET ORAL at 10:08

## 2021-02-20 RX ADMIN — DOCUSATE SODIUM AND SENNOSIDES 1 TABLET: 8.6; 5 TABLET, FILM COATED ORAL at 22:32

## 2021-02-20 RX ADMIN — ATORVASTATIN CALCIUM 20 MG: 20 TABLET, FILM COATED ORAL at 22:32

## 2021-02-20 RX ADMIN — THIAMINE HYDROCHLORIDE 100 MG: 100 INJECTION, SOLUTION INTRAMUSCULAR; INTRAVENOUS at 10:07

## 2021-02-20 RX ADMIN — DOCUSATE SODIUM AND SENNOSIDES 1 TABLET: 8.6; 5 TABLET, FILM COATED ORAL at 10:07

## 2021-02-20 RX ADMIN — ENOXAPARIN SODIUM 40 MG: 40 INJECTION SUBCUTANEOUS at 10:08

## 2021-02-20 RX ADMIN — CYANOCOBALAMIN 1000 MCG: 1000 INJECTION, SOLUTION INTRAMUSCULAR; SUBCUTANEOUS at 10:07

## 2021-02-20 RX ADMIN — FOLIC ACID 1 MG: 1 TABLET ORAL at 10:07

## 2021-02-20 RX ADMIN — THERA TABS 1 TABLET: TAB at 10:07

## 2021-02-20 RX ADMIN — OXYCODONE AND ACETAMINOPHEN 1 TABLET: 5; 325 TABLET ORAL at 10:07

## 2021-02-20 RX ADMIN — SODIUM CHLORIDE, PRESERVATIVE FREE 10 ML: 5 INJECTION INTRAVENOUS at 22:31

## 2021-02-20 RX ADMIN — SODIUM CHLORIDE, PRESERVATIVE FREE 10 ML: 5 INJECTION INTRAVENOUS at 10:09

## 2021-02-20 RX ADMIN — HYDROCHLOROTHIAZIDE 25 MG: 25 TABLET ORAL at 10:08

## 2021-02-20 RX ADMIN — Medication 5 MG: at 23:10

## 2021-02-20 RX ADMIN — Medication 81 MG: at 10:08

## 2021-02-20 ASSESSMENT — PAIN DESCRIPTION - ORIENTATION: ORIENTATION: LEFT

## 2021-02-20 ASSESSMENT — PAIN DESCRIPTION - PROGRESSION: CLINICAL_PROGRESSION: NOT CHANGED

## 2021-02-20 ASSESSMENT — PAIN DESCRIPTION - ONSET: ONSET: ON-GOING

## 2021-02-20 ASSESSMENT — PAIN SCALES - GENERAL: PAINLEVEL_OUTOF10: 5

## 2021-02-20 ASSESSMENT — PAIN DESCRIPTION - DESCRIPTORS: DESCRIPTORS: ACHING

## 2021-02-20 NOTE — PROGRESS NOTES
Physical Therapy  Name: Rufino Tsai  MRN:  [id number]  Date of service:  2/20/2021 02/20/21 1131   Subjective   Subjective Pt agrees to work with therapy   Pain Screening   Patient Currently in Pain Yes   Pain Assessment   Pain Assessment 0-10   Pain Level 5   Patient's Stated Pain Goal No pain   Pain Type Surgical pain   Pain Location Hip   Pain Orientation Left   Pain Descriptors Aching   Pain Frequency Continuous   Pain Onset On-going   Clinical Progression Not changed   Functional Pain Assessment Prevents or interferes some active activities and ADLs   Non-Pharmaceutical Pain Intervention(s) Ambulation/Increased Activity   Response to Pain Intervention Patient Satisfied   Transfers   Sit to Stand Minimal Assistance; Moderate Assistance  (from recliner)   Stand to sit Minimal Assistance   Ambulation   Ambulation? Yes   WB Status FWBAT LLE   Ambulation 1   Surface level tile   Device Rolling Walker   Assistance Minimal assistance;2 Person assistance   Quality of Gait antalgic   Gait Deviations Slow Kayleen;Decreased step length;Decreased step height   Distance 4'   Comments assistance required to advance L LE   Short term goals   Time Frame for Short term goals 2 wks   Short term goal 1 supine to sit indep   Short term goal 2 sit to stand indep   Short term goal 3 amb. 100' with RW SBA   Short term goal 4 up/down 3 stairs SBA   Conditions Requiring Skilled Therapeutic Intervention   Body structures, Functions, Activity limitations Decreased functional mobility ; Decreased ROM; Decreased strength;Decreased safe awareness;Decreased cognition;Decreased endurance;Decreased posture; Increased pain;Decreased balance;Decreased coordination   Assessment Pt not agitated today during therapy. Able to follow commands, required assistance advancing L LE   Activity Tolerance   Activity Tolerance Patient limited by cognitive status; Patient limited by pain; Patient limited by endurance   Safety Devices   Type of devices Left in chair;Call light within reach; Sitter present       Electronically signed by Guille Mcknight PTA on 2/20/2021 at 11:35 AM

## 2021-02-20 NOTE — PROGRESS NOTES
[facility] Progress Note    Patient:  Jose Corrales  YOB: 1945  Date of Service: 2/20/2021  MRN: [id number]   Acct: [de-identified]   Primary Care Physician: Samuel Aleman DO  Advance Directive: Full Code  Admit Date: 2/13/2021       Hospital Day: 7      CHIEF COMPLAINT:    Chief Complaint   Patient presents with    Fall    Hip Injury       2/20/2021 10:57 AM  Subjective / Interval History:   02/20/2021  Patient seen and examined this AM.  Doing well. No new complaints. Continues to have intermittent confusion, currently requiring one-to-one sitter at bedside for safety. No acute changes or acute overnight event reported. Patient laying calmly in bed in no apparent acute distress. 02/19/2021  Patient doing well. Laying comfortably in bed no acute distress. Family at bedside. Reportedly with intermittent confusion. No acute changes or acute overnight event reported. Patient continues to ask to go home. 02/18/2021  Patient seen and examined this AM.  Doing well. Reportedly with some visual hallucinations this a.m. Patient reportedly was noted to be yelling and saying he was seeing bugs crawling on the ceiling. No acute changes or acute overnight event reported. Mental status improved. Family at bedside. 02/17/2021  Patient seen and examined this AM.  Doing well. Family at bedside. Continues to have intermittent confusion. Laying comfortably in bed in no acute distress. No acute changes or acute overnight event reported. 02/16/2021  Patient seen and examined this AM.  Continues to be agitated, confused and with intermittent combativeness. No other acute changes or acute overnight event reported. Patient endorses fairly controlled pain. 02/15/2021  Patient with reported acute change in mental status with some agitation and delirium yesterday. Patient seen and examined this AM.  No new complaints. Mental status improved.   Laying comfortably in bed in no acute distress. No other acute changes or acute overnight event reported. Left hip pain fairly controlled. 02/14/2021  Patient seen and examined this AM.  Doing well. No new complaints. Left hip pain fairly controlled. Laying comfortably in bed in no acute distress. No acute changes or acute overnight event reported. Status post cephalomedullary nailing of left hip yesterday. Doing well postop. Review of Systems:   Review of Systems   Unable to perform ROS: Mental status change         DIET GENERAL;     Intake/Output Summary (Last 24 hours) at 2/20/2021 1057  Last data filed at 2/20/2021 0536  Gross per 24 hour   Intake 550 ml   Output 850 ml   Net -300 ml       Medications:   dextrose      sodium chloride 125 mL/hr at 02/17/21 2106     Current Facility-Administered Medications   Medication Dose Route Frequency Provider Last Rate Last Admin    aspirin EC tablet 81 mg  81 mg Oral Daily Varghese Saucedo MD   81 mg at 02/20/21 1008    atorvastatin (LIPITOR) tablet 20 mg  20 mg Oral Nightly Varghese Saucedo MD   20 mg at 02/19/21 2204    traZODone (DESYREL) tablet 50 mg  50 mg Oral Nightly PRN Varghese Saucedo MD   50 mg at 02/19/21 2205    melatonin disintegrating tablet 5 mg  5 mg Oral Nightly Varghese Saucedo MD   5 mg at 02/19/21 2205    LORazepam (ATIVAN) injection 1 mg  1 mg Intravenous Q4H PRN Varghese Saucedo MD   1 mg at 02/16/21 2117    multivitamin 1 tablet  1 tablet Oral Daily Varghese Saucedo MD   1 tablet at 02/20/21 1007    thiamine (B-1) injection 100 mg  100 mg Intravenous Daily Varghese Saucedo MD   100 mg at 77/47/79 9544    folic acid (FOLVITE) tablet 1 mg  1 mg Oral Daily Varghese Saucedo MD   1 mg at 02/20/21 1007    [START ON 2/27/2021] cyanocobalamin injection 1,000 mcg  1,000 mcg Intramuscular Q7 Days Varghese Saucedo MD        And    [START ON 4/6/2021] cyanocobalamin injection 1,000 mcg  1,000 mcg Intramuscular Q30 Days Varghese Saucedo MD        oxyCODONE-acetaminophen  acetaminophen (TYLENOL) tablet 650 mg  650 mg Oral Q6H PRN Pedro Khan MD   650 mg at 02/18/21 1101    Or    acetaminophen (TYLENOL) suppository 650 mg  650 mg Rectal Q6H PRN Pedro Khan MD        sennosides-docusate sodium (SENOKOT-S) 8.6-50 MG tablet 1 tablet  1 tablet Oral BID Lindsey Banerjee MD   1 tablet at 02/20/21 1007    enoxaparin (LOVENOX) injection 40 mg  40 mg Subcutaneous Daily Lindsey Banerjee MD   40 mg at 02/20/21 1008    lisinopril (PRINIVIL;ZESTRIL) tablet 20 mg  20 mg Oral Daily Pedro Khan MD   20 mg at 02/20/21 1008    And    hydroCHLOROthiazide (HYDRODIURIL) tablet 25 mg  25 mg Oral Daily Pedro Khan MD   25 mg at 02/20/21 1008         dextrose      sodium chloride 125 mL/hr at 02/17/21 2106      aspirin  81 mg Oral Daily    atorvastatin  20 mg Oral Nightly    melatonin  5 mg Oral Nightly    multivitamin  1 tablet Oral Daily    thiamine  100 mg Intravenous Daily    folic acid  1 mg Oral Daily    [START ON 2/27/2021] cyanocobalamin  1,000 mcg Intramuscular Q7 Days    And    [START ON 4/6/2021] cyanocobalamin  1,000 mcg Intramuscular Q30 Days    nicotine  1 patch Transdermal Daily    insulin lispro  0-6 Units Subcutaneous TID WC    insulin lispro  0-3 Units Subcutaneous Nightly    ondansetron  4 mg Intravenous Once    sodium chloride flush  10 mL Intravenous 2 times per day    sennosides-docusate sodium  1 tablet Oral BID    enoxaparin  40 mg Subcutaneous Daily    lisinopril  20 mg Oral Daily    And    hydroCHLOROthiazide  25 mg Oral Daily     traZODone, LORazepam, oxyCODONE-acetaminophen, glucose, dextrose, glucagon (rDNA), dextrose, sodium chloride flush, potassium chloride **OR** potassium alternative oral replacement **OR** potassium chloride, promethazine **OR** ondansetron, magnesium hydroxide, acetaminophen **OR** acetaminophen  DIET GENERAL;       Labs:   CBC with DIFF:  Recent Labs     02/18/21  0617 02/19/21  0426 02/20/21  0554 WBC 7.8 7.6 10.0   RBC 2.65* 2.75* 3.01*   HGB 8.5* 8.6* 9.5*   HCT 25.3* 26.7* 29.7*   MCV 95.5* 97.1* 98.7*   MCH 32.1* 31.3* 31.6*   MCHC 33.6 32.2* 32.0*   RDW 12.2 12.3 12.6    192 240   MPV 10.7 10.7 10.8   NEUTOPHILPCT 75.0* 73.4* 79.3*   LYMPHOPCT 14.1* 14.8* 11.3*   MONOPCT 8.2 8.3 7.3   EOSRELPCT 1.8 2.4 0.6   BASOPCT 0.3 0.3 0.2   NEUTROABS 5.8 5.6 8.0*   LYMPHSABS 1.1 1.1 1.1   MONOSABS 0.60 0.60 0.70   EOSABS 0.10 0.20 0.10   BASOSABS 0.00 0.00 0.00       CMP/BMP:  Recent Labs     02/18/21  0617 02/19/21  0426 02/20/21  0554    137 137   K 4.0 3.8 3.8    102 99   CO2 24 25 27   ANIONGAP 11 10 11   GLUCOSE 105 106 130*   BUN 23 20 31*   CREATININE 0.7 0.7 0.8   LABGLOM >60 >60 >60   CALCIUM 8.0* 8.4* 8.9         CRP:  No results for input(s): CRP in the last 72 hours. Sed Rate:  No results for input(s): SEDRATE in the last 72 hours. HgBA1c:  No components found for: HGBA1C  FLP:    Lab Results   Component Value Date    TRIG 104 02/20/2021    HDL 29 02/20/2021    [address] 80 02/20/2021     TSH:    Lab Results   Component Value Date    TSH 0.591 02/16/2021     Troponin T: No results for input(s): TROPONINI in the last 72 hours. Pro-BNP: No results for input(s): BNP in the last 72 hours. INR:   No results for input(s): INR in the last 72 hours. ABGs:   Lab Results   Component Value Date    PHART 7.430 02/16/2021    PO2ART 55.0 02/16/2021    UTM2WUJ 35.0 02/16/2021     UA:  No results for input(s): NITRITE, COLORU, PHUR, LABCAST, WBCUA, RBCUA, MUCUS, TRICHOMONAS, YEAST, BACTERIA, CLARITYU, SPECGRAV, LEUKOCYTESUR, UROBILINOGEN, BILIRUBINUR, BLOODU, GLUCOSEU, AMORPHOUS in the last 72 hours. Invalid input(s): Fabiola Carlson      Culture Results:    No results for input(s): CXSURG in the last 720 hours. Blood Culture Recent:   Recent Labs     02/14/21  1632   BC No growth after 5 days of incubation. Cultures:   No results for input(s): CULTURE in the last 72 hours.   No results Heart size is normal. There is mild ectasia of the thoracic aorta. No pneumothorax or significant pleural effusion is identified. The osseous structures and upper abdomen are unremarkable. No acute cardiopulmonary abnormality. Signed by Dr Gris Beckham on 2/13/2021 12:18 PM    Xr Hip 2-3 Vw W Pelvis Left    Result Date: 2/13/2021  EXAMINATION: XR HIP 2-3 VW W PELVIS LEFT 2/13/2021 12:14 PM HISTORY: Fall, left hip pain. Report: An AP view the pelvis was obtained as well as AP and crosstable lateral views of the left hip. COMPARISON: There are no correlative imaging studies for comparison. The right hip is unremarkable. There is an acute  intratrochanteric left hip fracture oriented vertically, with mild lateral displacement, no significant angulation or involvement of the femoral head. The other pelvic osseous structures appear unremarkable. Acute mildly displaced closed intratrochanteric left hip fracture as described. Signed by Dr Gris Beckham on 2/13/2021 12:16 PM        Objective:   Vitals:   BP (!) 106/58   Pulse 88   Temp 98.4 °F (36.9 °C) (Temporal)   Resp 18   Ht 6' (1.829 m)   Wt 195 lb (88.5 kg)   SpO2 93%   BMI 26.45 kg/m²       Patient Vitals for the past 24 hrs:   BP Temp Temp src Pulse Resp SpO2   02/20/21 0230 (!) 106/58 98.4 °F (36.9 °C) Temporal 88 18 93 %   02/19/21 1906 131/72 98.1 °F (36.7 °C) Temporal 85 17 92 %   02/19/21 1101 (!) 151/69 -- Temporal 100 18 91 %       24HR INTAKE/OUTPUT:      Intake/Output Summary (Last 24 hours) at 2/20/2021 1057  Last data filed at 2/20/2021 0536  Gross per 24 hour   Intake 550 ml   Output 850 ml   Net -300 ml       Physical Exam  Vitals signs and nursing note reviewed. Constitutional:       General: He is not in acute distress. Appearance: Normal appearance. He is not ill-appearing, toxic-appearing or diaphoretic. HENT:      Head: Normocephalic and atraumatic.       Right Ear: External ear normal.      Left Ear: External ear normal.      Nose: Nose normal. No congestion or rhinorrhea. Mouth/Throat:      Mouth: Mucous membranes are moist.      Pharynx: Oropharynx is clear. No oropharyngeal exudate or posterior oropharyngeal erythema. Eyes:      General: No scleral icterus. Right eye: No discharge. Left eye: No discharge. Extraocular Movements: Extraocular movements intact. Conjunctiva/sclera: Conjunctivae normal.   Neck:      Musculoskeletal: Normal range of motion and neck supple. No neck rigidity or muscular tenderness. Vascular: No carotid bruit. Cardiovascular:      Rate and Rhythm: Normal rate and regular rhythm. Pulses: Normal pulses. Heart sounds: Normal heart sounds. No murmur. No friction rub. No gallop. Pulmonary:      Effort: Pulmonary effort is normal. No respiratory distress. Breath sounds: Normal breath sounds. No stridor. No wheezing, rhonchi or rales. Chest:      Chest wall: No tenderness. Abdominal:      General: Bowel sounds are normal. There is no distension. Palpations: Abdomen is soft. Tenderness: There is no abdominal tenderness. There is no guarding or rebound. Musculoskeletal:         General: No swelling, tenderness, deformity or signs of injury. Right lower leg: No edema. Left lower leg: No edema. Comments: Decreased ROM in left hip due to pain. Intact ROM in bilateral upper and right lower extremities. Skin:     General: Skin is warm and dry. Capillary Refill: Capillary refill takes less than 2 seconds. Coloration: Skin is not jaundiced or pale. Findings: No bruising, erythema, lesion or rash. Neurological:      General: No focal deficit present. Mental Status: He is alert. He is disoriented. Cranial Nerves: No cranial nerve deficit. Sensory: No sensory deficit. Motor: No weakness.       Coordination: Coordination normal.           Assessment/plan:         Hospital Problems Last Modified POA    * (Principal) Hip fracture requiring operative repair, left, closed, initial encounter ([facility] [address] 2/13/2021 Yes    Hyperglycemia 2/13/2021 Yes    Essential hypertension 2/16/2021 Yes    Fall from slipping on ice 2/13/2021 Yes    Altered mental state 2/16/2021 Yes    Smoker 2/16/2021 Yes    Pain in hip 2/17/2021 Yes    Gait abnormality 2/17/2021 Yes          Principal Problem:    Hip fracture requiring operative repair, left, closed, initial encounter [facility])  Active Problems:    Hyperglycemia    Essential hypertension    Fall from slipping on ice    Altered mental state    Smoker    Pain in hip    Gait abnormality  Resolved Problems:    * No resolved hospital problems. *      Brief Summary  Mr. Jessica Echeverria, a 67 y.o. male with a history of HTN, presenting to [address] [place] [address] ED (02/13/2021) on account an acute onset of moderate to severe*left hip pain, after reported mechanical fall.     Patient reportedly slipped and fell onto the ice, landing on his left hip. Patient reportedly unable to ambulate after the fall due to pain.      Nno other associated symptoms reported. No dizziness, lightheadedness or palpitation reported.     X-ray reported an acute mildly displaced closed intratrochanteric left hip  Fracture     Orthopedic surgery consulted from ED  Patient admitted for further work-up and management.        -ruled out  Altered mental status  Leukocytosis, with a left shift  · Likely delirium with superimposed ?   Infectious etiology  · WBC of 10.6 on presentation (02/23/2021)  · Briefly worsened leukocytosis: WBC of 15.1--> 16.1 (02/14/2021), with a left shift  · Leukocytosis resolved  · Elevated procalcitonin level  · Initial lactic acid of 2.3 (02/14/2021)  · Repeat lactic acid of 1.1, after IV fluids  · Initially started on empiric antibiotics (vancomycin and cefepime)  · Blood cultures no growth to date  · Observing off antibiotic for now  · UA unremarkable  · Neurology following-appreciate recommendation    Altered mental status  Visual hallucinations-resolved  · Improved  · Continue Thiamine, multivitamin, folic acid p.o. daily  · Ammonia and TSH within lab reference range  · Neurology following-appreciate recommendations  · Status post EEG (02/17/2021): Impression: \"There is much movement and myogenic artifact seen throughout the recording. During the interpretable portions the background activity appears to be slightly slow. This finding is consistent with a mild diffisue disturbance in cerebral function. No clear epileptiform activity was noted during the recording period. \"  · MRI brain without contrast (02/18/2021): Impression: There is a 1 cm area of possible mildly restricted diffusion versus artifact at the right martha. No correlating abnormal signal on FLAIR. Dale Rogers Moderate chronic microvascular changes and volume loss. · Further management as per neurology recommendation. Vitamin B12 deficiency  · B12 level <150 [211 - 946 pg/mL]   · Cyanocobalamin 1000 mcg IM daily x5 days (start date: 02/16/2021), then,  · Cyanocobalamin 1000 mcg IM weekly, x4 doses (start date: 02/27/2021)  · Cyanocobalamin 1000 mcg IM monthly (start date 04/06/2021)      Acute mildly displaced closed intratrochanteric left hip  Fracture  · Status post mechanical fall from slipping on ice  · Followed by Orthopedics surgery  · Status post cephalomedullary nailing of left intratrochanteric hip fracture. (02/13/2021)  · Continue symptomatic and supportive management including pain management as needed.   · PT OT       History of hypertension  · Continue home regimen  · Lisinopril-HCTZ 20-25 mg p.o. daily     Hyperglycemia  · No documented history of diabetes  · Hemoglobin A1c level: 5.6%  · However patient with persistent hyperglycemia  · Glucose of 161 (02/14/2021)  · Insulin as part on low-dose sliding scale  · Continue monitoring POC glucose           Continue management of other chronic medical conditions - see above and orders. Advance Directive: Full Code    DIET GENERAL;         Consults Made:   IP CONSULT TO ORTHOPEDIC SURGERY  IP CONSULT TO SOCIAL WORK  PHARMACY TO DOSE VANCOMYCIN  IP CONSULT TO REHAB/TCU ADMISSION COORDINATOR  IP CONSULT TO NEUROLOGY  IP CONSULT TO REHAB/TCU ADMISSION COORDINATOR  IP CONSULT TO HOME CARE NEEDS    DVT prophylaxis: Enoxaparin      Discharge planning:   Neurology following for altered mental status  Evaluated by inpatient acute rehab and patient deemed not eligible. Family interested in SNF placement  Referrals sent, as per  awaiting approval/denial.  Pending placement      Time Spent is 25 mins in the examination, evaluation, counseling and review of medications, assessment and plan.      Electronically signed by   Marisol Alcaraz MD, MPH,   Internal Medicine Hospitalist   2/20/2021 10:57 AM

## 2021-02-21 LAB
ANION GAP SERPL CALCULATED.3IONS-SCNC: 7 MMOL/L (ref 7–19)
BASOPHILS ABSOLUTE: 0 K/UL (ref 0–0.2)
BASOPHILS RELATIVE PERCENT: 0.3 % (ref 0–1)
BUN BLDV-MCNC: 32 MG/DL (ref 8–23)
CALCIUM SERPL-MCNC: 8.6 MG/DL (ref 8.8–10.2)
CHLORIDE BLD-SCNC: 100 MMOL/L (ref 98–111)
CO2: 30 MMOL/L (ref 22–29)
CREAT SERPL-MCNC: 0.7 MG/DL (ref 0.5–1.2)
EOSINOPHILS ABSOLUTE: 0.2 K/UL (ref 0–0.6)
EOSINOPHILS RELATIVE PERCENT: 1.8 % (ref 0–5)
GFR AFRICAN AMERICAN: >59
GFR NON-AFRICAN AMERICAN: >60
GLUCOSE BLD-MCNC: 110 MG/DL (ref 74–109)
GLUCOSE BLD-MCNC: 112 MG/DL (ref 70–99)
GLUCOSE BLD-MCNC: 145 MG/DL (ref 70–99)
GLUCOSE BLD-MCNC: 158 MG/DL (ref 70–99)
GLUCOSE BLD-MCNC: 169 MG/DL (ref 70–99)
HCT VFR BLD CALC: 28.9 % (ref 42–52)
HEMOGLOBIN: 9.3 G/DL (ref 14–18)
IMMATURE GRANULOCYTES #: 0.1 K/UL
LYMPHOCYTES ABSOLUTE: 1.7 K/UL (ref 1.1–4.5)
LYMPHOCYTES RELATIVE PERCENT: 17.8 % (ref 20–40)
MCH RBC QN AUTO: 31.7 PG (ref 27–31)
MCHC RBC AUTO-ENTMCNC: 32.2 G/DL (ref 33–37)
MCV RBC AUTO: 98.6 FL (ref 80–94)
MONOCYTES ABSOLUTE: 0.8 K/UL (ref 0–0.9)
MONOCYTES RELATIVE PERCENT: 8.4 % (ref 0–10)
NEUTROPHILS ABSOLUTE: 6.7 K/UL (ref 1.5–7.5)
NEUTROPHILS RELATIVE PERCENT: 70.4 % (ref 50–65)
PDW BLD-RTO: 12.6 % (ref 11.5–14.5)
PERFORMED ON: ABNORMAL
PLATELET # BLD: 246 K/UL (ref 130–400)
PMV BLD AUTO: 10.5 FL (ref 9.4–12.4)
POTASSIUM REFLEX MAGNESIUM: 3.8 MMOL/L (ref 3.5–5)
RBC # BLD: 2.93 M/UL (ref 4.7–6.1)
SODIUM BLD-SCNC: 137 MMOL/L (ref 136–145)
WBC # BLD: 9.5 K/UL (ref 4.8–10.8)

## 2021-02-21 PROCEDURE — 6370000000 HC RX 637 (ALT 250 FOR IP): Performed by: INTERNAL MEDICINE

## 2021-02-21 PROCEDURE — 6360000002 HC RX W HCPCS: Performed by: PSYCHIATRY & NEUROLOGY

## 2021-02-21 PROCEDURE — 6360000002 HC RX W HCPCS: Performed by: ORTHOPAEDIC SURGERY

## 2021-02-21 PROCEDURE — 6370000000 HC RX 637 (ALT 250 FOR IP): Performed by: ORTHOPAEDIC SURGERY

## 2021-02-21 PROCEDURE — 36415 COLL VENOUS BLD VENIPUNCTURE: CPT

## 2021-02-21 PROCEDURE — 6370000000 HC RX 637 (ALT 250 FOR IP): Performed by: PSYCHIATRY & NEUROLOGY

## 2021-02-21 PROCEDURE — 1210000000 HC MED SURG R&B

## 2021-02-21 PROCEDURE — 82947 ASSAY GLUCOSE BLOOD QUANT: CPT

## 2021-02-21 PROCEDURE — 97116 GAIT TRAINING THERAPY: CPT

## 2021-02-21 PROCEDURE — 80048 BASIC METABOLIC PNL TOTAL CA: CPT

## 2021-02-21 PROCEDURE — 85025 COMPLETE CBC W/AUTO DIFF WBC: CPT

## 2021-02-21 PROCEDURE — 2580000003 HC RX 258: Performed by: INTERNAL MEDICINE

## 2021-02-21 RX ADMIN — ATORVASTATIN CALCIUM 20 MG: 20 TABLET, FILM COATED ORAL at 20:08

## 2021-02-21 RX ADMIN — SODIUM CHLORIDE, PRESERVATIVE FREE 10 ML: 5 INJECTION INTRAVENOUS at 20:09

## 2021-02-21 RX ADMIN — DOCUSATE SODIUM AND SENNOSIDES 1 TABLET: 8.6; 5 TABLET, FILM COATED ORAL at 08:54

## 2021-02-21 RX ADMIN — FOLIC ACID 1 MG: 1 TABLET ORAL at 08:54

## 2021-02-21 RX ADMIN — ENOXAPARIN SODIUM 40 MG: 40 INJECTION SUBCUTANEOUS at 08:54

## 2021-02-21 RX ADMIN — OXYCODONE AND ACETAMINOPHEN 1 TABLET: 5; 325 TABLET ORAL at 06:59

## 2021-02-21 RX ADMIN — INSULIN LISPRO 1 UNITS: 100 INJECTION, SOLUTION INTRAVENOUS; SUBCUTANEOUS at 21:47

## 2021-02-21 RX ADMIN — Medication 5 MG: at 20:08

## 2021-02-21 RX ADMIN — INSULIN LISPRO 1 UNITS: 100 INJECTION, SOLUTION INTRAVENOUS; SUBCUTANEOUS at 08:55

## 2021-02-21 RX ADMIN — LISINOPRIL 20 MG: 20 TABLET ORAL at 08:54

## 2021-02-21 RX ADMIN — THIAMINE HYDROCHLORIDE 100 MG: 100 INJECTION, SOLUTION INTRAMUSCULAR; INTRAVENOUS at 08:54

## 2021-02-21 RX ADMIN — ACETAMINOPHEN 650 MG: 325 TABLET ORAL at 15:18

## 2021-02-21 RX ADMIN — THERA TABS 1 TABLET: TAB at 08:54

## 2021-02-21 RX ADMIN — HYDROCHLOROTHIAZIDE 25 MG: 25 TABLET ORAL at 08:55

## 2021-02-21 RX ADMIN — Medication 81 MG: at 08:54

## 2021-02-21 RX ADMIN — OXYCODONE AND ACETAMINOPHEN 1 TABLET: 5; 325 TABLET ORAL at 22:57

## 2021-02-21 ASSESSMENT — PAIN SCALES - GENERAL: PAINLEVEL_OUTOF10: 6

## 2021-02-21 ASSESSMENT — PAIN DESCRIPTION - DESCRIPTORS: DESCRIPTORS: ACHING;SORE

## 2021-02-21 ASSESSMENT — PAIN DESCRIPTION - ONSET: ONSET: ON-GOING

## 2021-02-21 ASSESSMENT — PAIN DESCRIPTION - LOCATION: LOCATION: HIP

## 2021-02-21 ASSESSMENT — PAIN DESCRIPTION - PROGRESSION: CLINICAL_PROGRESSION: GRADUALLY IMPROVING

## 2021-02-21 ASSESSMENT — PAIN - FUNCTIONAL ASSESSMENT: PAIN_FUNCTIONAL_ASSESSMENT: PREVENTS OR INTERFERES SOME ACTIVE ACTIVITIES AND ADLS

## 2021-02-21 NOTE — PROGRESS NOTES
Physical Therapy  Name: Marck Damon  MRN:  [id number]  Date of service:  2/21/2021 02/21/21 1219   Subjective   Subjective Pt agrees to walk with therapy   Pain Screening   Patient Currently in Pain Yes   Pain Assessment   Pain Assessment 0-10   Pain Level 6   Patient's Stated Pain Goal No pain   Pain Type Surgical pain   Pain Location Hip   Pain Orientation Left   Pain Descriptors Aching; Sore   Pain Frequency Continuous   Pain Onset On-going   Clinical Progression Gradually improving   Functional Pain Assessment Prevents or interferes some active activities and ADLs   Non-Pharmaceutical Pain Intervention(s) Ambulation/Increased Activity   Response to Pain Intervention Patient Satisfied   Transfers   Sit to Stand Minimal Assistance; Moderate Assistance;2 Person Assistance  (from recliner)   Stand to sit Minimal Assistance;2 Person Assistance   Ambulation   Ambulation? Yes   WB Status FWBAT LLE   Ambulation 1   Surface level tile   Device Rolling Walker   Assistance Minimal assistance; Moderate assistance   Quality of Gait antalgic   Gait Deviations Slow Kayleen;Decreased step length;Decreased step height   Distance 12'   Comments assistance from sitter in room, pt required assistance to move L LE forward during amb. Short term goals   Time Frame for Short term goals 2 wks   Short term goal 1 supine to sit indep   Short term goal 2 sit to stand indep   Short term goal 3 amb. 100' with RW SBA   Short term goal 4 up/down 3 stairs SBA   Conditions Requiring Skilled Therapeutic Intervention   Body structures, Functions, Activity limitations Decreased functional mobility ; Decreased ROM; Decreased strength;Decreased safe awareness;Decreased cognition;Decreased endurance;Decreased posture; Increased pain;Decreased balance;Decreased coordination   Assessment Pt basically cooperative today, has sitter in room. Pt required assistance to advance LLE during amb, vc's for technique during amb.  Required seated rest during

## 2021-02-21 NOTE — PROGRESS NOTES
The patient's wife requests that staff not discuss nursing homes with the patient. It causes him to become upset. The patient does not want to go to a \"nursing home\" but seems open to [facility] for therapy\". He is much more agreeable to working with staff, and his wife is hopeful that our inpatient rehab could screen him again on Monday if possible.

## 2021-02-21 NOTE — PROGRESS NOTES
Patient has been alert and oriented. He has been pleasant, calm, and cooperative. He worked with therapy, and has not been impulsive. Sitter discontinued after discussing with Dr. Mauricio Trevino. The patient is up in the chair talking on the phone at the present time. Will continue to monitor. After talking on the phone with his wife, with whom he initiated contact with, he has become more agitated and irritable. He does wish to go home, but understands that he needs therapy. He remains mostly oriented, but his thought process seems to wander. He has not been verbally or physically abusive towards staff.

## 2021-02-21 NOTE — PROGRESS NOTES
[facility] Progress Note    Patient:  Cristopher Pena  YOB: 1945  Date of Service: 2/21/2021  MRN: [id number]   Acct: [de-identified]   Primary Care Physician: Matty Mcdonnell DO  Advance Directive: Full Code  Admit Date: 2/13/2021       Hospital Day: 8      CHIEF COMPLAINT: Left hip pain  Chief Complaint   Patient presents with    Fall    Hip Injury       2/21/2021 10:33 AM  Subjective / Interval History:   02/21/2021  No new complaints. Patient continues to be intermittently confused, one-to-one sitter at bedside for safety. No acute changes or acute overnight event reported. Patient reports left hip pain fairly controlled with pain regimen. Denies any other acute complaints or distress at this time. 02/20/2021  Patient seen and examined this AM.  Doing well. No new complaints. Continues to have intermittent confusion, currently requiring one-to-one sitter at bedside for safety. No acute changes or acute overnight event reported. Patient laying calmly in bed in no apparent acute distress. 02/19/2021  Patient doing well. Laying comfortably in bed no acute distress. Family at bedside. Reportedly with intermittent confusion. No acute changes or acute overnight event reported. Patient continues to ask to go home. 02/18/2021  Patient seen and examined this AM.  Doing well. Reportedly with some visual hallucinations this a.m. Patient reportedly was noted to be yelling and saying he was seeing bugs crawling on the ceiling. No acute changes or acute overnight event reported. Mental status improved. Family at bedside. 02/17/2021  Patient seen and examined this AM.  Doing well. Family at bedside. Continues to have intermittent confusion. Laying comfortably in bed in no acute distress. No acute changes or acute overnight event reported. 02/16/2021  Patient seen and examined this AM.  Continues to be agitated, confused and with intermittent combativeness. No other acute changes or acute overnight event reported. Patient endorses fairly controlled pain. 02/15/2021  Patient with reported acute change in mental status with some agitation and delirium yesterday. Patient seen and examined this AM.  No new complaints. Mental status improved. Laying comfortably in bed in no acute distress. No other acute changes or acute overnight event reported. Left hip pain fairly controlled. 02/14/2021  Patient seen and examined this AM.  Doing well. No new complaints. Left hip pain fairly controlled. Laying comfortably in bed in no acute distress. No acute changes or acute overnight event reported. Status post cephalomedullary nailing of left hip yesterday. Doing well postop. Review of Systems:   Review of Systems   Unable to perform ROS: Mental status change         DIET GENERAL;     Intake/Output Summary (Last 24 hours) at 2/21/2021 1033  Last data filed at 2/21/2021 0949  Gross per 24 hour   Intake 1000 ml   Output 600 ml   Net 400 ml       Medications:   dextrose      sodium chloride 125 mL/hr at 02/17/21 2106     Current Facility-Administered Medications   Medication Dose Route Frequency Provider Last Rate Last Admin    aspirin EC tablet 81 mg  81 mg Oral Daily Ankit Flowers MD   81 mg at 02/21/21 0854    atorvastatin (LIPITOR) tablet 20 mg  20 mg Oral Nightly Ankit Flowers MD   20 mg at 02/20/21 2232    traZODone (DESYREL) tablet 50 mg  50 mg Oral Nightly PRN Ankit Flowers MD   50 mg at 02/19/21 2205    melatonin disintegrating tablet 5 mg  5 mg Oral Nightly Ankit Flowers MD   5 mg at 02/20/21 2310    LORazepam (ATIVAN) injection 1 mg  1 mg Intravenous Q4H PRN Ankit Flowers MD   1 mg at 02/16/21 2117    multivitamin 1 tablet  1 tablet Oral Daily Ankit Flowers MD   1 tablet at 02/21/21 0854    thiamine (B-1) injection 100 mg  100 mg Intravenous Daily Ankit Flowers MD   100 mg at 75/38/67 3582    folic acid (FOLVITE) tablet 1 mg  1 mg Oral Daily Maria Elena Moreno MD   1 mg at 02/21/21 0854    [START ON 2/27/2021] cyanocobalamin injection 1,000 mcg  1,000 mcg Intramuscular Q7 Days Maria Elena Moreno MD        And    [START ON 4/6/2021] cyanocobalamin injection 1,000 mcg  1,000 mcg Intramuscular Q30 Days Maria Elena Moreno MD        oxyCODONE-acetaminophen (PERCOCET) 5-325 MG per tablet 1 tablet  1 tablet Oral Q6H PRN Derek Alaniz MD   1 tablet at 02/21/21 0659    nicotine (NICODERM CQ) 21 MG/24HR 1 patch  1 patch Transdermal Daily Derek Alaniz MD   1 patch at 02/21/21 0853    insulin lispro (HUMALOG) injection vial 0-6 Units  0-6 Units Subcutaneous TID WC Derek Alaniz MD   1 Units at 02/21/21 0855    insulin lispro (HUMALOG) injection vial 0-3 Units  0-3 Units Subcutaneous Nightly Derek Alaniz MD        glucose (GLUTOSE) 40 % oral gel 15 g  15 g Oral PRN Derek Alaniz MD        dextrose 50 % IV solution  12.5 g Intravenous PRN Derek Alaniz MD        glucagon (rDNA) injection 1 mg  1 mg Intramuscular PRN Derek Alaniz MD        dextrose 5 % solution  100 mL/hr Intravenous PRN Derek Alaniz MD        0.9 % sodium chloride infusion   Intravenous Continuous Derek Alaniz  mL/hr at 02/17/21 2106 New Bag at 02/17/21 2106    ondansetron (ZOFRAN) injection 4 mg  4 mg Intravenous Once Wing Jamie MD        sodium chloride flush 0.9 % injection 10 mL  10 mL Intravenous 2 times per day Derek Alaniz MD   10 mL at 02/20/21 2231    sodium chloride flush 0.9 % injection 10 mL  10 mL Intravenous PRN Derek Alaniz MD        potassium chloride (KLOR-CON M) extended release tablet 40 mEq  40 mEq Oral PRN Derek Alaniz MD        Or    potassium bicarb-citric acid (EFFER-K) effervescent tablet 40 mEq  40 mEq Oral PRN Derek Alaniz MD        Or    potassium chloride 10 mEq/100 mL IVPB (Peripheral Line)  10 mEq Intravenous PRN Derek Alaniz MD        promethazine dextrose, sodium chloride flush, potassium chloride **OR** potassium alternative oral replacement **OR** potassium chloride, promethazine **OR** ondansetron, magnesium hydroxide, acetaminophen **OR** acetaminophen  DIET GENERAL;       Labs:   CBC with DIFF:  Recent Labs     02/19/21  0426 02/20/21  0554 02/21/21  0549   WBC 7.6 10.0 9.5   RBC 2.75* 3.01* 2.93*   HGB 8.6* 9.5* 9.3*   HCT 26.7* 29.7* 28.9*   MCV 97.1* 98.7* 98.6*   MCH 31.3* 31.6* 31.7*   MCHC 32.2* 32.0* 32.2*   RDW 12.3 12.6 12.6    240 246   MPV 10.7 10.8 10.5   NEUTOPHILPCT 73.4* 79.3* 70.4*   LYMPHOPCT 14.8* 11.3* 17.8*   MONOPCT 8.3 7.3 8.4   EOSRELPCT 2.4 0.6 1.8   BASOPCT 0.3 0.2 0.3   NEUTROABS 5.6 8.0* 6.7   LYMPHSABS 1.1 1.1 1.7   MONOSABS 0.60 0.70 0.80   EOSABS 0.20 0.10 0.20   BASOSABS 0.00 0.00 0.00       CMP/BMP:  Recent Labs     02/19/21  0426 02/20/21  0554 02/21/21  0549    137 137   K 3.8 3.8 3.8    99 100   CO2 25 27 30*   ANIONGAP 10 11 7   GLUCOSE 106 130* 110*   BUN 20 31* 32*   CREATININE 0.7 0.8 0.7   LABGLOM >60 >60 >60   CALCIUM 8.4* 8.9 8.6*         CRP:  No results for input(s): CRP in the last 72 hours. Sed Rate:  No results for input(s): SEDRATE in the last 72 hours. HgBA1c:  No components found for: HGBA1C  FLP:    Lab Results   Component Value Date    TRIG 104 02/20/2021    HDL 29 02/20/2021    [address] 80 02/20/2021     TSH:    Lab Results   Component Value Date    TSH 0.591 02/16/2021     Troponin T: No results for input(s): TROPONINI in the last 72 hours. Pro-BNP: No results for input(s): BNP in the last 72 hours. INR:   No results for input(s): INR in the last 72 hours.   ABGs:   Lab Results   Component Value Date    PHART 7.430 02/16/2021    PO2ART 55.0 02/16/2021    LXK6HTB 35.0 02/16/2021     UA:  No results for input(s): NITRITE, COLORU, PHUR, LABCAST, WBCUA, RBCUA, MUCUS, TRICHOMONAS, YEAST, BACTERIA, CLARITYU, SPECGRAV, LEUKOCYTESUR, UROBILINOGEN, BILIRUBINUR, BLOODU, GLUCOSEU, AMORPHOUS in the last 72 hours. Invalid input(s): Bhumi Libman      Culture Results:    No results for input(s): CXSURG in the last 720 hours. Blood Culture Recent:   Recent Labs     02/14/21  1632   BC No growth after 5 days of incubation. Cultures:   No results for input(s): CULTURE in the last 72 hours. No results for input(s): BC, [place] in the last 72 hours. No results for input(s): CXSURG in the last 72 hours. No results for input(s): MG, PHOS in the last 72 hours. No results for input(s): AST, ALT, ALB, BILITOT, ALKPHOS, ALB in the last 72 hours. RAD:   Xr Femur Left (min 2 Views)    Result Date: 2/13/2021  EXAMINATION: XR FEMUR LEFT (MIN 2 VIEWS) 2/13/2021 3:21 PM HISTORY: Left hip fracture, open reduction internal fixation. Fluoroscopy time: 1 minute 3 seconds. Radiation dose: 0.2846 mGym2 Number fluoroscopic images acquired: 4. Report: 4 separate intraoperative fluoroscopic spot views were obtained under the supervision of the orthopedic surgery service, during open reduction internal fixation of the intratrochanteric left hip fracture, with satisfactory hardware positioning and alignment. Images are stored in PACS for review. Signed by Dr Jacky Mayfield. Bhavani on 2/13/2021 3:22 PM    Xr Femur Left (min 2 Views)    Result Date: 2/13/2021  EXAMINATION: XR FEMUR LEFT (MIN 2 VIEWS) 2/13/2021 1:38 PM HISTORY: Fall, acute left hip fracture. Report: 3 views of the left femur were obtained. COMPARISON: X-rays of the pelvis left hip from the same day. There is acute vertically oriented closed fracture within the intertrochanteric aspect of the left hip, with mild lateral displacement and no involvement of the femoral head. The mid shaft and distal femur are unremarkable. There is soft tissue swelling in the region of the fracture. Acute closed vertically oriented intertrochanteric left hip fracture with mild lateral displacement. The mid shaft and distal left femur are unremarkable.  Signed by Dr Rashaun Beckham on 2/13/2021 1:39 PM    Xr Chest Portable    Result Date: 2/13/2021  EXAMINATION: XR CHEST PORTABLE 2/13/2021 12:16 PM HISTORY: Fall, pain. Report: A portable frontal view of the chest was obtained. COMPARISON: There are no correlative imaging studies for comparison. The lungs are clear, well expanded. Heart size is normal. There is mild ectasia of the thoracic aorta. No pneumothorax or significant pleural effusion is identified. The osseous structures and upper abdomen are unremarkable. No acute cardiopulmonary abnormality. Signed by Dr Rashaun Beckham on 2/13/2021 12:18 PM    Xr Hip 2-3 Vw W Pelvis Left    Result Date: 2/13/2021  EXAMINATION: XR HIP 2-3 VW W PELVIS LEFT 2/13/2021 12:14 PM HISTORY: Fall, left hip pain. Report: An AP view the pelvis was obtained as well as AP and crosstable lateral views of the left hip. COMPARISON: There are no correlative imaging studies for comparison. The right hip is unremarkable. There is an acute  intratrochanteric left hip fracture oriented vertically, with mild lateral displacement, no significant angulation or involvement of the femoral head. The other pelvic osseous structures appear unremarkable. Acute mildly displaced closed intratrochanteric left hip fracture as described. Signed by Dr Rashaun Beckham on 2/13/2021 12:16 PM        Objective:   Vitals:   /71   Pulse 77   Temp 98.4 °F (36.9 °C) (Temporal)   Resp 20   Ht 6' (1.829 m)   Wt 195 lb (88.5 kg)   SpO2 91%   BMI 26.45 kg/m²       Patient Vitals for the past 24 hrs:   BP Temp Temp src Pulse Resp SpO2   02/21/21 0708 123/71 98.4 °F (36.9 °C) Temporal 77 20 91 %   02/21/21 0511 128/65 98.1 °F (36.7 °C) Temporal 76 17 97 %   02/20/21 1849 125/68 98.3 °F (36.8 °C) Temporal 82 18 97 %   02/20/21 1524 112/62 98.6 °F (37 °C) Temporal 87 20 95 %       24HR INTAKE/OUTPUT:      Intake/Output Summary (Last 24 hours) at 2/21/2021 1033  Last data filed at 2/21/2021 4345  Gross per 24 hour   Intake 1000 ml   Output 600 ml   Net 400 ml       Physical Exam  Vitals signs and nursing note reviewed. Constitutional:       General: He is not in acute distress. Appearance: Normal appearance. He is not ill-appearing, toxic-appearing or diaphoretic. HENT:      Head: Normocephalic and atraumatic. Right Ear: External ear normal.      Left Ear: External ear normal.      Nose: Nose normal. No congestion or rhinorrhea. Mouth/Throat:      Mouth: Mucous membranes are moist.      Pharynx: Oropharynx is clear. No oropharyngeal exudate or posterior oropharyngeal erythema. Eyes:      General: No scleral icterus. Right eye: No discharge. Left eye: No discharge. Extraocular Movements: Extraocular movements intact. Conjunctiva/sclera: Conjunctivae normal.   Neck:      Musculoskeletal: Normal range of motion and neck supple. No neck rigidity or muscular tenderness. Vascular: No carotid bruit. Cardiovascular:      Rate and Rhythm: Normal rate and regular rhythm. Pulses: Normal pulses. Heart sounds: Normal heart sounds. No murmur. No friction rub. No gallop. Pulmonary:      Effort: Pulmonary effort is normal. No respiratory distress. Breath sounds: Normal breath sounds. No stridor. No wheezing, rhonchi or rales. Chest:      Chest wall: No tenderness. Abdominal:      General: Bowel sounds are normal. There is no distension. Palpations: Abdomen is soft. Tenderness: There is no abdominal tenderness. There is no guarding or rebound. Musculoskeletal:         General: No swelling, tenderness, deformity or signs of injury. Right lower leg: No edema. Left lower leg: No edema. Comments: Decreased ROM in left hip due to pain. Intact ROM in bilateral upper and right lower extremities. Skin:     General: Skin is warm and dry. Capillary Refill: Capillary refill takes less than 2 seconds.       Coloration: Skin is not jaundiced or pale. Findings: No bruising, erythema, lesion or rash. Neurological:      General: No focal deficit present. Mental Status: He is alert. He is disoriented. Cranial Nerves: No cranial nerve deficit. Sensory: No sensory deficit. Motor: No weakness. Coordination: Coordination normal.           Assessment/plan:         Hospital Problems           Last Modified POA    * (Principal) Hip fracture requiring operative repair, left, closed, initial encounter ([facility] [address] 2/13/2021 Yes    Hyperglycemia 2/13/2021 Yes    Essential hypertension 2/16/2021 Yes    Fall from slipping on ice 2/13/2021 Yes    Altered mental state 2/16/2021 Yes    Smoker 2/16/2021 Yes    Pain in hip 2/17/2021 Yes    Gait abnormality 2/17/2021 Yes          Principal Problem:    Hip fracture requiring operative repair, left, closed, initial encounter [facility])  Active Problems:    Hyperglycemia    Essential hypertension    Fall from slipping on ice    Altered mental state    Smoker    Pain in hip    Gait abnormality  Resolved Problems:    * No resolved hospital problems. *      Brief Summary  Mr. Pat Sher, a 67 y.o. male with a history of HTN, presenting to [address] [place] [address] ED (02/13/2021) on account an acute onset of moderate to severe*left hip pain, after reported mechanical fall.     Patient reportedly slipped and fell onto the ice, landing on his left hip. Patient reportedly unable to ambulate after the fall due to pain.      Nno other associated symptoms reported. No dizziness, lightheadedness or palpitation reported.     X-ray reported an acute mildly displaced closed intratrochanteric left hip  Fracture     Orthopedic surgery consulted from ED  Patient admitted for further work-up and management.     Acute mildly displaced closed intratrochanteric left hip  Fracture  · Status post mechanical fall from slipping on ice  · Followed by Orthopedics surgery  · Status post cephalomedullary nailing of left intratrochanteric hip fracture. (02/13/2021)  · Continue symptomatic and supportive management including pain management as needed. · PT OT       Altered mental status  Visual hallucinations-resolved  · Improved  · Continue Thiamine, multivitamin, folic acid p.o. daily  · Ammonia and TSH within lab reference range  · Neurology following-appreciate recommendations  · Status post EEG (02/17/2021): Impression: \"There is much movement and myogenic artifact seen throughout the recording. During the interpretable portions the background activity appears to be slightly slow. This finding is consistent with a mild diffisue disturbance in cerebral function. No clear epileptiform activity was noted during the recording period. \"  · MRI brain without contrast (02/18/2021): Impression: There is a 1 cm area of possible mildly restricted diffusion versus artifact at the right martha. No correlating abnormal signal on FLAIR. Lysbeth Carrel Moderate chronic microvascular changes and volume loss. · Further management as per neurology recommendation. Vitamin B12 deficiency  · B12 level <150 [211 - 946 pg/mL]   · Cyanocobalamin 1000 mcg IM daily x5 days (start date: 02/16/2021), then,  · Cyanocobalamin 1000 mcg IM weekly, x4 doses (start date: 02/27/2021)  · Cyanocobalamin 1000 mcg IM monthly (start date 04/06/2021)        Leukocytosis, with a left shift-resolved  · Likely delirium with superimposed. · No obvious infectious etiology identified  · WBC of 10.6 on presentation (02/23/2021)  · Briefly worsened leukocytosis: WBC of 15.1--> 16.1 (02/14/2021), with a left shift  · Leukocytosis resolved  · Elevated procalcitonin level  · Initial lactic acid of 2.3 (02/14/2021)  · Repeat lactic acid of 1.1, after IV fluids  · Initially started on empiric antibiotics (vancomycin and cefepime)  · Blood cultures no growth to date  · Observing off antibiotic for now  · Patient has remained afebrile, with no leukocytosis.   · UA unremarkable  · Neurology following-appreciate

## 2021-02-22 ENCOUNTER — HOSPITAL ENCOUNTER (INPATIENT)
Age: 76
LOS: 11 days | Discharge: HOME HEALTH | DRG: 056 | End: 2021-03-05
Attending: PSYCHIATRY & NEUROLOGY | Admitting: PSYCHIATRY & NEUROLOGY
Payer: MEDICARE

## 2021-02-22 DIAGNOSIS — S72.002A HIP FRACTURE REQUIRING OPERATIVE REPAIR, LEFT, CLOSED, INITIAL ENCOUNTER (HCC): Primary | ICD-10-CM

## 2021-02-22 DIAGNOSIS — M25.559 PAIN IN HIP: ICD-10-CM

## 2021-02-22 DIAGNOSIS — R26.9 GAIT ABNORMALITY: ICD-10-CM

## 2021-02-22 DIAGNOSIS — I63.9 ACUTE ISCHEMIC STROKE (HCC): ICD-10-CM

## 2021-02-22 DIAGNOSIS — I10 ESSENTIAL HYPERTENSION: ICD-10-CM
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
## 2021-02-22 DIAGNOSIS — E53.8 VITAMIN B12 DEFICIENCY: ICD-10-CM

## 2021-02-22 PROBLEM — E78.49 OTHER HYPERLIPIDEMIA: Status: ACTIVE | Noted: 2021-02-22

## 2021-02-22 PROBLEM — G47.09 OTHER INSOMNIA: Status: ACTIVE | Noted: 2021-02-22

## 2021-02-22 LAB
ANION GAP SERPL CALCULATED.3IONS-SCNC: 9 MMOL/L (ref 7–19)
BASOPHILS ABSOLUTE: 0 K/UL (ref 0–0.2)
BASOPHILS RELATIVE PERCENT: 0.3 % (ref 0–1)
BUN BLDV-MCNC: 33 MG/DL (ref 8–23)
CALCIUM SERPL-MCNC: 8.4 MG/DL (ref 8.8–10.2)
CHLORIDE BLD-SCNC: 102 MMOL/L (ref 98–111)
CO2: 27 MMOL/L (ref 22–29)
CREAT SERPL-MCNC: 0.7 MG/DL (ref 0.5–1.2)
EOSINOPHILS ABSOLUTE: 0.2 K/UL (ref 0–0.6)
EOSINOPHILS RELATIVE PERCENT: 1.6 % (ref 0–5)
GFR AFRICAN AMERICAN: >59
GFR NON-AFRICAN AMERICAN: >60
GLUCOSE BLD-MCNC: 105 MG/DL (ref 70–99)
GLUCOSE BLD-MCNC: 111 MG/DL (ref 74–109)
GLUCOSE BLD-MCNC: 121 MG/DL (ref 70–99)
GLUCOSE BLD-MCNC: 123 MG/DL (ref 70–99)
GLUCOSE BLD-MCNC: 130 MG/DL (ref 70–99)
HCT VFR BLD CALC: 30 % (ref 42–52)
HEMOGLOBIN: 9.2 G/DL (ref 14–18)
IMMATURE GRANULOCYTES #: 0.1 K/UL
INR BLD: 1.11 (ref 0.88–1.18)
LYMPHOCYTES ABSOLUTE: 1.7 K/UL (ref 1.1–4.5)
LYMPHOCYTES RELATIVE PERCENT: 19 % (ref 20–40)
MCH RBC QN AUTO: 31.3 PG (ref 27–31)
MCHC RBC AUTO-ENTMCNC: 30.7 G/DL (ref 33–37)
MCV RBC AUTO: 102 FL (ref 80–94)
MONOCYTES ABSOLUTE: 0.6 K/UL (ref 0–0.9)
MONOCYTES RELATIVE PERCENT: 6.9 % (ref 0–10)
NEUTROPHILS ABSOLUTE: 6.5 K/UL (ref 1.5–7.5)
NEUTROPHILS RELATIVE PERCENT: 71 % (ref 50–65)
PDW BLD-RTO: 12.5 % (ref 11.5–14.5)
PERFORMED ON: ABNORMAL
PLATELET # BLD: 244 K/UL (ref 130–400)
PMV BLD AUTO: 10.8 FL (ref 9.4–12.4)
POTASSIUM REFLEX MAGNESIUM: 4.3 MMOL/L (ref 3.5–5)
PREALBUMIN: 10 MG/DL (ref 20–40)
PROTHROMBIN TIME: 14.2 SEC (ref 12–14.6)
RBC # BLD: 2.94 M/UL (ref 4.7–6.1)
SARS-COV-2, NAAT: NOT DETECTED
SODIUM BLD-SCNC: 138 MMOL/L (ref 136–145)
TSH REFLEX FT4: 0.65 UIU/ML (ref 0.35–5.5)
VITAMIN B-12: 752 PG/ML (ref 211–946)
WBC # BLD: 9.1 K/UL (ref 4.8–10.8)

## 2021-02-22 PROCEDURE — 6360000002 HC RX W HCPCS: Performed by: PSYCHIATRY & NEUROLOGY

## 2021-02-22 PROCEDURE — 84134 ASSAY OF PREALBUMIN: CPT

## 2021-02-22 PROCEDURE — 6370000000 HC RX 637 (ALT 250 FOR IP): Performed by: INTERNAL MEDICINE

## 2021-02-22 PROCEDURE — 97530 THERAPEUTIC ACTIVITIES: CPT

## 2021-02-22 PROCEDURE — 2580000003 HC RX 258: Performed by: INTERNAL MEDICINE

## 2021-02-22 PROCEDURE — 85025 COMPLETE CBC W/AUTO DIFF WBC: CPT

## 2021-02-22 PROCEDURE — 6360000002 HC RX W HCPCS: Performed by: ORTHOPAEDIC SURGERY

## 2021-02-22 PROCEDURE — 80048 BASIC METABOLIC PNL TOTAL CA: CPT

## 2021-02-22 PROCEDURE — 84443 ASSAY THYROID STIM HORMONE: CPT

## 2021-02-22 PROCEDURE — 6370000000 HC RX 637 (ALT 250 FOR IP): Performed by: PSYCHIATRY & NEUROLOGY

## 2021-02-22 PROCEDURE — 97116 GAIT TRAINING THERAPY: CPT

## 2021-02-22 PROCEDURE — 85610 PROTHROMBIN TIME: CPT

## 2021-02-22 PROCEDURE — 82947 ASSAY GLUCOSE BLOOD QUANT: CPT

## 2021-02-22 PROCEDURE — 1180000000 HC REHAB R&B

## 2021-02-22 PROCEDURE — 6370000000 HC RX 637 (ALT 250 FOR IP): Performed by: ORTHOPAEDIC SURGERY

## 2021-02-22 PROCEDURE — 87635 SARS-COV-2 COVID-19 AMP PRB: CPT

## 2021-02-22 PROCEDURE — 82607 VITAMIN B-12: CPT

## 2021-02-22 PROCEDURE — 36415 COLL VENOUS BLD VENIPUNCTURE: CPT

## 2021-02-22 RX ORDER — SODIUM CHLORIDE 0.9 % (FLUSH) 0.9 %
10 SYRINGE (ML) INJECTION PRN
Status: CANCELLED | OUTPATIENT
Start: 2021-02-22

## 2021-02-22 RX ORDER — THIAMINE HYDROCHLORIDE 100 MG/ML
100 INJECTION, SOLUTION INTRAMUSCULAR; INTRAVENOUS DAILY
Status: CANCELLED | OUTPATIENT
Start: 2021-02-23

## 2021-02-22 RX ORDER — DEXTROSE MONOHYDRATE 25 G/50ML
12.5 INJECTION, SOLUTION INTRAVENOUS PRN
Status: DISCONTINUED | OUTPATIENT
Start: 2021-02-22 | End: 2021-03-05 | Stop reason: HOSPADM

## 2021-02-22 RX ORDER — FOLIC ACID 1 MG/1
1 TABLET ORAL DAILY
Status: CANCELLED | OUTPATIENT
Start: 2021-02-23

## 2021-02-22 RX ORDER — MULTIVITAMIN WITH IRON
1 TABLET ORAL DAILY
Status: DISCONTINUED | OUTPATIENT
Start: 2021-02-23 | End: 2021-03-05 | Stop reason: HOSPADM

## 2021-02-22 RX ORDER — CYANOCOBALAMIN 1000 UG/ML
1000 INJECTION INTRAMUSCULAR; SUBCUTANEOUS
Status: DISCONTINUED | OUTPATIENT
Start: 2021-02-27 | End: 2021-03-05 | Stop reason: HOSPADM

## 2021-02-22 RX ORDER — ASPIRIN 81 MG/1
81 TABLET ORAL DAILY
Status: CANCELLED | OUTPATIENT
Start: 2021-02-23

## 2021-02-22 RX ORDER — NICOTINE POLACRILEX 4 MG
15 LOZENGE BUCCAL PRN
Status: DISCONTINUED | OUTPATIENT
Start: 2021-02-22 | End: 2021-03-05 | Stop reason: HOSPADM

## 2021-02-22 RX ORDER — HYDROCHLOROTHIAZIDE 25 MG/1
25 TABLET ORAL DAILY
Status: DISCONTINUED | OUTPATIENT
Start: 2021-02-23 | End: 2021-02-25

## 2021-02-22 RX ORDER — PROMETHAZINE HYDROCHLORIDE 12.5 MG/1
12.5 TABLET ORAL EVERY 6 HOURS PRN
Status: CANCELLED | OUTPATIENT
Start: 2021-02-22

## 2021-02-22 RX ORDER — CYANOCOBALAMIN 1000 UG/ML
1000 INJECTION INTRAMUSCULAR; SUBCUTANEOUS
Status: CANCELLED | OUTPATIENT
Start: 2021-02-27 | End: 2021-03-27

## 2021-02-22 RX ORDER — HYDROCHLOROTHIAZIDE 25 MG/1
25 TABLET ORAL DAILY
Status: CANCELLED | OUTPATIENT
Start: 2021-02-23

## 2021-02-22 RX ORDER — MECOBALAMIN 5000 MCG
5 TABLET,DISINTEGRATING ORAL NIGHTLY
Status: DISCONTINUED | OUTPATIENT
Start: 2021-02-22 | End: 2021-03-05 | Stop reason: HOSPADM

## 2021-02-22 RX ORDER — POLYETHYLENE GLYCOL 3350 17 G/17G
17 POWDER, FOR SOLUTION ORAL DAILY PRN
Status: DISCONTINUED | OUTPATIENT
Start: 2021-02-22 | End: 2021-03-01

## 2021-02-22 RX ORDER — POTASSIUM CHLORIDE 7.45 MG/ML
10 INJECTION INTRAVENOUS PRN
Status: DISCONTINUED | OUTPATIENT
Start: 2021-02-22 | End: 2021-03-05 | Stop reason: HOSPADM

## 2021-02-22 RX ORDER — DEXTROSE MONOHYDRATE 50 MG/ML
100 INJECTION, SOLUTION INTRAVENOUS PRN
Status: CANCELLED | OUTPATIENT
Start: 2021-02-22

## 2021-02-22 RX ORDER — POTASSIUM CHLORIDE 20 MEQ/1
40 TABLET, EXTENDED RELEASE ORAL PRN
Status: CANCELLED | OUTPATIENT
Start: 2021-02-22

## 2021-02-22 RX ORDER — MECOBALAMIN 5000 MCG
5 TABLET,DISINTEGRATING ORAL NIGHTLY
Status: CANCELLED | OUTPATIENT
Start: 2021-02-22

## 2021-02-22 RX ORDER — ONDANSETRON 2 MG/ML
4 INJECTION INTRAMUSCULAR; INTRAVENOUS EVERY 6 HOURS PRN
Status: DISCONTINUED | OUTPATIENT
Start: 2021-02-22 | End: 2021-03-05 | Stop reason: HOSPADM

## 2021-02-22 RX ORDER — ACETAMINOPHEN 325 MG/1
650 TABLET ORAL EVERY 6 HOURS PRN
Status: CANCELLED | OUTPATIENT
Start: 2021-02-22

## 2021-02-22 RX ORDER — ASPIRIN 81 MG/1
81 TABLET ORAL DAILY
Status: DISCONTINUED | OUTPATIENT
Start: 2021-02-23 | End: 2021-03-05 | Stop reason: HOSPADM

## 2021-02-22 RX ORDER — DEXTROSE MONOHYDRATE 50 MG/ML
100 INJECTION, SOLUTION INTRAVENOUS PRN
Status: DISCONTINUED | OUTPATIENT
Start: 2021-02-22 | End: 2021-03-05 | Stop reason: HOSPADM

## 2021-02-22 RX ORDER — NICOTINE POLACRILEX 4 MG
15 LOZENGE BUCCAL PRN
Status: CANCELLED | OUTPATIENT
Start: 2021-02-22

## 2021-02-22 RX ORDER — FOLIC ACID 1 MG/1
1 TABLET ORAL DAILY
Status: DISCONTINUED | OUTPATIENT
Start: 2021-02-23 | End: 2021-03-05 | Stop reason: HOSPADM

## 2021-02-22 RX ORDER — CYANOCOBALAMIN 1000 UG/ML
1000 INJECTION INTRAMUSCULAR; SUBCUTANEOUS
Status: CANCELLED | OUTPATIENT
Start: 2021-04-06

## 2021-02-22 RX ORDER — ACETAMINOPHEN 325 MG/1
650 TABLET ORAL EVERY 6 HOURS PRN
Status: DISCONTINUED | OUTPATIENT
Start: 2021-02-22 | End: 2021-03-05 | Stop reason: HOSPADM

## 2021-02-22 RX ORDER — NICOTINE 21 MG/24HR
1 PATCH, TRANSDERMAL 24 HOURS TRANSDERMAL DAILY
Status: DISCONTINUED | OUTPATIENT
Start: 2021-02-23 | End: 2021-03-05 | Stop reason: HOSPADM

## 2021-02-22 RX ORDER — NICOTINE 21 MG/24HR
1 PATCH, TRANSDERMAL 24 HOURS TRANSDERMAL DAILY
Status: CANCELLED | OUTPATIENT
Start: 2021-02-23

## 2021-02-22 RX ORDER — LORAZEPAM 2 MG/ML
1 INJECTION INTRAMUSCULAR EVERY 4 HOURS PRN
Status: CANCELLED | OUTPATIENT
Start: 2021-02-22

## 2021-02-22 RX ORDER — SODIUM CHLORIDE 0.9 % (FLUSH) 0.9 %
10 SYRINGE (ML) INJECTION EVERY 12 HOURS SCHEDULED
Status: CANCELLED | OUTPATIENT
Start: 2021-02-22

## 2021-02-22 RX ORDER — SODIUM CHLORIDE 0.9 % (FLUSH) 0.9 %
10 SYRINGE (ML) INJECTION EVERY 12 HOURS SCHEDULED
Status: DISCONTINUED | OUTPATIENT
Start: 2021-02-22 | End: 2021-03-01

## 2021-02-22 RX ORDER — PROMETHAZINE HYDROCHLORIDE 12.5 MG/1
12.5 TABLET ORAL EVERY 6 HOURS PRN
Status: DISCONTINUED | OUTPATIENT
Start: 2021-02-22 | End: 2021-03-05 | Stop reason: HOSPADM

## 2021-02-22 RX ORDER — OXYCODONE HYDROCHLORIDE AND ACETAMINOPHEN 5; 325 MG/1; MG/1
1 TABLET ORAL EVERY 6 HOURS PRN
Status: DISCONTINUED | OUTPATIENT
Start: 2021-02-22 | End: 2021-03-05 | Stop reason: HOSPADM

## 2021-02-22 RX ORDER — LISINOPRIL 20 MG/1
20 TABLET ORAL DAILY
Status: DISCONTINUED | OUTPATIENT
Start: 2021-02-23 | End: 2021-02-25

## 2021-02-22 RX ORDER — SODIUM CHLORIDE 9 MG/ML
INJECTION, SOLUTION INTRAVENOUS CONTINUOUS
Status: DISCONTINUED | OUTPATIENT
Start: 2021-02-22 | End: 2021-02-22

## 2021-02-22 RX ORDER — SODIUM CHLORIDE 0.9 % (FLUSH) 0.9 %
10 SYRINGE (ML) INJECTION PRN
Status: DISCONTINUED | OUTPATIENT
Start: 2021-02-22 | End: 2021-03-05 | Stop reason: HOSPADM

## 2021-02-22 RX ORDER — ATORVASTATIN CALCIUM 20 MG/1
20 TABLET, FILM COATED ORAL NIGHTLY
Status: DISCONTINUED | OUTPATIENT
Start: 2021-02-22 | End: 2021-03-05 | Stop reason: HOSPADM

## 2021-02-22 RX ORDER — MULTIVITAMIN WITH IRON
1 TABLET ORAL DAILY
Status: CANCELLED | OUTPATIENT
Start: 2021-02-23

## 2021-02-22 RX ORDER — POTASSIUM CHLORIDE 20 MEQ/1
40 TABLET, EXTENDED RELEASE ORAL PRN
Status: DISCONTINUED | OUTPATIENT
Start: 2021-02-22 | End: 2021-03-05 | Stop reason: HOSPADM

## 2021-02-22 RX ORDER — TRAZODONE HYDROCHLORIDE 50 MG/1
50 TABLET ORAL NIGHTLY PRN
Status: CANCELLED | OUTPATIENT
Start: 2021-02-22

## 2021-02-22 RX ORDER — SODIUM CHLORIDE 9 MG/ML
INJECTION, SOLUTION INTRAVENOUS CONTINUOUS
Status: CANCELLED | OUTPATIENT
Start: 2021-02-22

## 2021-02-22 RX ORDER — TRAZODONE HYDROCHLORIDE 50 MG/1
50 TABLET ORAL NIGHTLY PRN
Status: DISCONTINUED | OUTPATIENT
Start: 2021-02-22 | End: 2021-03-05 | Stop reason: HOSPADM

## 2021-02-22 RX ORDER — POTASSIUM CHLORIDE 7.45 MG/ML
10 INJECTION INTRAVENOUS PRN
Status: CANCELLED | OUTPATIENT
Start: 2021-02-22

## 2021-02-22 RX ORDER — ACETAMINOPHEN 650 MG/1
650 SUPPOSITORY RECTAL EVERY 6 HOURS PRN
Status: CANCELLED | OUTPATIENT
Start: 2021-02-22

## 2021-02-22 RX ORDER — ACETAMINOPHEN 325 MG/1
650 TABLET ORAL EVERY 4 HOURS PRN
Status: DISCONTINUED | OUTPATIENT
Start: 2021-02-22 | End: 2021-03-05 | Stop reason: HOSPADM

## 2021-02-22 RX ORDER — DEXTROSE MONOHYDRATE 25 G/50ML
12.5 INJECTION, SOLUTION INTRAVENOUS PRN
Status: CANCELLED | OUTPATIENT
Start: 2021-02-22

## 2021-02-22 RX ORDER — ONDANSETRON 2 MG/ML
4 INJECTION INTRAMUSCULAR; INTRAVENOUS EVERY 6 HOURS PRN
Status: CANCELLED | OUTPATIENT
Start: 2021-02-22

## 2021-02-22 RX ORDER — ACETAMINOPHEN 650 MG/1
650 SUPPOSITORY RECTAL EVERY 6 HOURS PRN
Status: DISCONTINUED | OUTPATIENT
Start: 2021-02-22 | End: 2021-03-05 | Stop reason: HOSPADM

## 2021-02-22 RX ORDER — LISINOPRIL 20 MG/1
20 TABLET ORAL DAILY
Status: CANCELLED | OUTPATIENT
Start: 2021-02-23

## 2021-02-22 RX ORDER — CYANOCOBALAMIN 1000 UG/ML
1000 INJECTION INTRAMUSCULAR; SUBCUTANEOUS
Status: DISCONTINUED | OUTPATIENT
Start: 2021-05-06 | End: 2021-03-05 | Stop reason: HOSPADM

## 2021-02-22 RX ORDER — ATORVASTATIN CALCIUM 20 MG/1
20 TABLET, FILM COATED ORAL NIGHTLY
Status: CANCELLED | OUTPATIENT
Start: 2021-02-22

## 2021-02-22 RX ORDER — OXYCODONE HYDROCHLORIDE AND ACETAMINOPHEN 5; 325 MG/1; MG/1
1 TABLET ORAL EVERY 6 HOURS PRN
Status: CANCELLED | OUTPATIENT
Start: 2021-02-22

## 2021-02-22 RX ORDER — SENNA AND DOCUSATE SODIUM 50; 8.6 MG/1; MG/1
1 TABLET, FILM COATED ORAL 2 TIMES DAILY
Status: DISCONTINUED | OUTPATIENT
Start: 2021-02-22 | End: 2021-02-25

## 2021-02-22 RX ORDER — SENNA AND DOCUSATE SODIUM 50; 8.6 MG/1; MG/1
1 TABLET, FILM COATED ORAL 2 TIMES DAILY
Status: CANCELLED | OUTPATIENT
Start: 2021-02-22

## 2021-02-22 RX ADMIN — SODIUM CHLORIDE, PRESERVATIVE FREE 10 ML: 5 INJECTION INTRAVENOUS at 21:00

## 2021-02-22 RX ADMIN — Medication 5 MG: at 20:45

## 2021-02-22 RX ADMIN — HYDROCHLOROTHIAZIDE 25 MG: 25 TABLET ORAL at 08:02

## 2021-02-22 RX ADMIN — DOCUSATE SODIUM 50 MG AND SENNOSIDES 8.6 MG 1 TABLET: 8.6; 5 TABLET, FILM COATED ORAL at 20:45

## 2021-02-22 RX ADMIN — THIAMINE HYDROCHLORIDE 100 MG: 100 INJECTION, SOLUTION INTRAMUSCULAR; INTRAVENOUS at 08:02

## 2021-02-22 RX ADMIN — SODIUM CHLORIDE, PRESERVATIVE FREE 10 ML: 5 INJECTION INTRAVENOUS at 08:03

## 2021-02-22 RX ADMIN — Medication 81 MG: at 08:02

## 2021-02-22 RX ADMIN — ENOXAPARIN SODIUM 40 MG: 40 INJECTION SUBCUTANEOUS at 08:02

## 2021-02-22 RX ADMIN — FOLIC ACID 1 MG: 1 TABLET ORAL at 08:02

## 2021-02-22 RX ADMIN — ATORVASTATIN CALCIUM 20 MG: 20 TABLET, FILM COATED ORAL at 20:45

## 2021-02-22 RX ADMIN — THERA TABS 1 TABLET: TAB at 08:02

## 2021-02-22 RX ADMIN — DOCUSATE SODIUM AND SENNOSIDES 1 TABLET: 8.6; 5 TABLET, FILM COATED ORAL at 08:02

## 2021-02-22 RX ADMIN — LISINOPRIL 20 MG: 20 TABLET ORAL at 08:02

## 2021-02-22 RX ADMIN — OXYCODONE HYDROCHLORIDE AND ACETAMINOPHEN 1 TABLET: 5; 325 TABLET ORAL at 20:45

## 2021-02-22 ASSESSMENT — PAIN - FUNCTIONAL ASSESSMENT
PAIN_FUNCTIONAL_ASSESSMENT: PREVENTS OR INTERFERES SOME ACTIVE ACTIVITIES AND ADLS
PAIN_FUNCTIONAL_ASSESSMENT: PREVENTS OR INTERFERES SOME ACTIVE ACTIVITIES AND ADLS

## 2021-02-22 ASSESSMENT — PAIN DESCRIPTION - DESCRIPTORS: DESCRIPTORS: ACHING

## 2021-02-22 ASSESSMENT — PAIN SCALES - GENERAL
PAINLEVEL_OUTOF10: 7
PAINLEVEL_OUTOF10: 5
PAINLEVEL_OUTOF10: 0

## 2021-02-22 ASSESSMENT — PAIN DESCRIPTION - FREQUENCY: FREQUENCY: CONTINUOUS

## 2021-02-22 ASSESSMENT — PAIN DESCRIPTION - ORIENTATION: ORIENTATION: LEFT

## 2021-02-22 ASSESSMENT — PAIN DESCRIPTION - LOCATION: LOCATION: HIP

## 2021-02-22 NOTE — PROGRESS NOTES
Occupational Therapy  Facility/Department: [facility] SURG SERVICES  Daily Treatment Note  NAME: Kika Vargas  : 1945  MRN: [id number]    Date of Service: 2021    Discharge Recommendations:  Patient would benefit from continued therapy after discharge       Assessment   Assessment: Pt. doing much better today on mobility. Able to advance surgical leg. More compliant with therapy requests. Still impaired with seated LB dsg due to limited hip flexion  Activity Tolerance  Activity Tolerance: Patient Tolerated treatment well         Patient Diagnosis(es): The primary encounter diagnosis was Closed fracture of left hip, initial encounter ([facility] [address] Diagnoses of Fall due to slipping on ice or snow, initial encounter and Essential hypertension were also pertinent to this visit. has a past medical history of Cerebral artery occlusion with cerebral infarction ([facility] [address] and Hypertension. has a past surgical history that includes Femur fracture surgery (Left, 2021). Restrictions  Restrictions/Precautions  Restrictions/Precautions: Fall Risk, Weight Bearing  Required Braces or Orthoses?: No  Lower Extremity Weight Bearing Restrictions  Left Lower Extremity Weight Bearing: Weight Bearing As Tolerated  Subjective   General  Chart Reviewed: Yes  Patient assessed for rehabilitation services?: Yes  Response to previous treatment: Patient with no complaints from previous session  Family / Caregiver Present: No  General Comment  Comments: Pt. ready to get up, seems more motivated to participate than 3 days ago.   Pain Assessment  Pain Level: 5  Pain Location: Hip  Pain Orientation: Left  Pain Descriptors: Aching  Pain Frequency: Continuous  Functional Pain Assessment: Prevents or interferes some active activities and ADLs  Vital Signs  Patient Currently in Pain: Yes   Orientation     Objective                Bed mobility  Supine to Sit: Moderate assistance;2 Person assistance(Mod A 1 to 2)  Transfers  Stand Step Transfers: Contact guard assistance;Minimal assistance  Sit to stand: Minimal assistance                                                                 Plan   Plan  Times per week: 3-5 days/wk.   G-Code     OutComes Score                                                  AM-PAC Score             Goals  Short term goals  Short term goal 1: Min A for dressing  Short term goal 2: Min A for toileting  Short term goal 3: Min A for transfers  Long term goals  Long term goal 1: Upgrade as tolerated       Therapy Time   Individual Concurrent Group Co-treatment   Time In           Time Out           Minutes                   Akiko Walker, OT

## 2021-02-22 NOTE — CARE COORDINATION
The [address] at [facility]  Notification of Admission Decision      [x] Patient has been accepted for admit to [facility] on : 2/22/21 Room 813      Please write discharge orders and summary prior to discharge. [] Patient acceptance to Rehab pending the following :    [] Eval in progress       [] Patient determined to be ineligible for services at [facility] because : We recommend you consider        Thank you for your referral, we appreciate you.     Electronically Signed by Kei Higginbotham Admissions Coordinator 2/22/2021 12:24 PM

## 2021-02-22 NOTE — PROGRESS NOTES
Physical Therapy  Name: Cruzito Reyes  MRN:  [id number]  Date of service:  2/22/2021 02/22/21 1351   General   Missed reason Patient declined   Subjective   Subjective Attempt: Pt declines getting back to bed at this time, says he is \"going upstairs today\" and wants to stay up in the chair.          Electronically signed by Kari Freed PTA on 2/22/2021 at 1:53 PM

## 2021-02-22 NOTE — DISCHARGE SUMMARY
Rufino Tsai  :  1945  MRN:  [id number]    Admit date:  2021  Discharge date:  2021       Admitting Physician:  Maryan Fuentes MD    Advance Directive: Full Code    Consults Made:   IP CONSULT TO ORTHOPEDIC SURGERY  IP CONSULT TO SOCIAL WORK  PHARMACY TO DOSE VANCOMYCIN  IP CONSULT TO REHAB/TCU ADMISSION COORDINATOR  IP CONSULT TO NEUROLOGY  IP CONSULT TO REHAB/TCU ADMISSION COORDINATOR  IP CONSULT TO [address] [place] [address]      Primary Care Physician:  Reid Lindsay,     Discharge Diagnoses:  Principal Problem:    Hip fracture requiring operative repair, left, closed, initial encounter [facility])  Active Problems:    Hyperglycemia    Essential hypertension    Fall from slipping on ice    Altered mental state    Smoker    Pain in hip    Gait abnormality  Resolved Problems:    * No resolved hospital problems. *        Pertinent Labs:  CBC with DIFF:  Recent Labs     21  0554 21  0549 21  0347   WBC 10.0 9.5 9.1   RBC 3.01* 2.93* 2.94*   HGB 9.5* 9.3* 9.2*   HCT 29.7* 28.9* 30.0*   MCV 98.7* 98.6* 102.0*   MCH 31.6* 31.7* 31.3*   MCHC 32.0* 32.2* 30.7*   RDW 12.6 12.6 12.5    246 244   MPV 10.8 10.5 10.8   NEUTOPHILPCT 79.3* 70.4* 71.0*   LYMPHOPCT 11.3* 17.8* 19.0*   MONOPCT 7.3 8.4 6.9   EOSRELPCT 0.6 1.8 1.6   BASOPCT 0.2 0.3 0.3   NEUTROABS 8.0* 6.7 6.5   LYMPHSABS 1.1 1.7 1.7   MONOSABS 0.70 0.80 0.60   EOSABS 0.10 0.20 0.20   BASOSABS 0.00 0.00 0.00       CMP/BMP:  Recent Labs     21  0554 21  0549 21  0347    137 138   K 3.8 3.8 4.3   CL 99 100 102   CO2 27 30* 27   ANIONGAP 11 7 9   GLUCOSE 130* 110* 111*   BUN 31* 32* 33*   CREATININE 0.8 0.7 0.7   LABGLOM >60 >60 >60   CALCIUM 8.9 8.6* 8.4*         CRP:  No results for input(s): CRP in the last 72 hours. Sed Rate:  No results for input(s): SEDRATE in the last 72 hours.       HgBA1c:  No components found for: HGBA1C  FLP:    Lab Results   Component Value Date    TRIG 104 2021    HDL 29 FEMUR LEFT (MIN 2 VIEWS) 2/13/2021 1:38 PM HISTORY: Fall, acute left hip fracture. Report: 3 views of the left femur were obtained. COMPARISON: X-rays of the pelvis left hip from the same day. There is acute vertically oriented closed fracture within the intertrochanteric aspect of the left hip, with mild lateral displacement and no involvement of the femoral head. The mid shaft and distal femur are unremarkable. There is soft tissue swelling in the region of the fracture. Acute closed vertically oriented intertrochanteric left hip fracture with mild lateral displacement. The mid shaft and distal left femur are unremarkable. Signed by Dr David Beckham on 2/13/2021 1:39 PM    Ct Head Wo Contrast    Result Date: 2/14/2021  EXAMINATION: CT HEAD WO CONTRAST 2/14/2021 4:23 PM HISTORY: Recent fall on ice, head trauma. Acute confusion. DOSE: 624 mGycm. Automatic exposure control was utilized in an effort to use as little radiation as possible, without compromising image quality. REPORT: Spiral CT of the head was performed without contrast, reconstructed coronal and sagittal images are also reviewed. COMPARISON: CT head without contrast 11/18/2016. No intracranial hemorrhage, mass or mass effect is identified. There is mild diffuse cerebral atrophy with associated mild ventriculomegaly. There is decreased attenuation in the periventricular and subcortical white matter tracts compatible with moderately advanced chronic small vessel white matter ischemic disease. This appears stable. Review of bone windows shows no skull fracture. There is normal aeration of the visualized paranasal sinuses and mastoid air cells. Benign-appearing lucent foci are noted within the suboccipital skull and appears stable. No acute intracranial abnormality. Mild diffuse cerebral atrophy and associated ventriculomegaly and advanced chronic small vessel white matter ischemic disease. Signed by Dr David Beckham on 2/14/2021 4:26 PM    Xr Chest Portable    Result Date: 2/13/2021  EXAMINATION: XR CHEST PORTABLE 2/13/2021 12:16 PM HISTORY: Fall, pain. Report: A portable frontal view of the chest was obtained. COMPARISON: There are no correlative imaging studies for comparison. The lungs are clear, well expanded. Heart size is normal. There is mild ectasia of the thoracic aorta. No pneumothorax or significant pleural effusion is identified. The osseous structures and upper abdomen are unremarkable. No acute cardiopulmonary abnormality. Signed by Dr Renetta Beckham on 2/13/2021 12:18 PM    Xr Hip 2-3 Vw W Pelvis Left    Result Date: 2/13/2021  EXAMINATION: XR HIP 2-3 VW W PELVIS LEFT 2/13/2021 12:14 PM HISTORY: Fall, left hip pain. Report: An AP view the pelvis was obtained as well as AP and crosstable lateral views of the left hip. COMPARISON: There are no correlative imaging studies for comparison. The right hip is unremarkable. There is an acute  intratrochanteric left hip fracture oriented vertically, with mild lateral displacement, no significant angulation or involvement of the femoral head. The other pelvic osseous structures appear unremarkable. Acute mildly displaced closed intratrochanteric left hip fracture as described. Signed by Dr Renetta Beckham on 2/13/2021 12:16 PM        Hospital Course:   Mr. Ben Chapman y. o. male with a history of HTN, presenting to [address] [place] [address] ED (02/13/2021) on account an acute onset of moderate to severe*left hip pain, after reported mechanical fall.     Patient reportedly slipped and fell onto the ice, landing on his left hip. Patient reportedly unable to ambulate after the fall due to pain.      Nno other associated symptoms reported.  No dizziness, lightheadedness or palpitation reported.     X-ray reported an acute mildly displaced closed intratrochanteric left hip  Fracture     Orthopedic surgery consulted from ED  Patient admitted for further work-up and management.           Leukocytosis, with a left cloNIDine (CATAPRES) 0.1 MG tablet  Take 0.1 mg by mouth 2 times daily as needed for High Blood Pressure (for sbp >373 or Diastolic >96)             cyanocobalamin ([employer] VITAMIN B12) 1000 MCG tablet  Take 1 tablet by mouth daily             folic acid (FOLVITE) 1 MG tablet  Take 1 tablet by mouth daily             lisinopril-hydrochlorothiazide (PRINZIDE;ZESTORETIC) 20-25 MG per tablet  Take 1 tablet by mouth daily             melatonin 5 MG TBDP disintegrating tablet  Take 1 tablet by mouth nightly             Multiple Vitamin (MULTIVITAMIN) TABS tablet  Take 1 tablet by mouth daily             nicotine (NICODERM CQ) 21 MG/24HR  Place 1 patch onto the skin daily             oxyCODONE-acetaminophen (PERCOCET) 5-325 MG per tablet  Take 1 tablet by mouth every 6 hours as needed for Pain for up to 3 days. sennosides-docusate sodium (SENOKOT-S) 8.6-50 MG tablet  Take 1 tablet by mouth 2 times daily                 Discharge Instructions: Follow up with Matty Mcdonnell DO in 7 days. Take medications as directed. Resume activity as tolerated. Diet: DIET GENERAL;        DISCHARGE STATUS:    Condition: Good  Disposition: Patient is medically stable and will be discharged home    Extended Emergency Contact Information  Primary Emergency Contact: [address] of [address] [place] [address] Phone: [phone]  Relation: Brother/Sister  Secondary Emergency Contact: Annita Willett  [employer] Phone: [phone]  Relation: Spouse       Time Spent on discharge is more than 30 minutes in the examination, evaluation, counseling and review of medications and discharge plan. Electronically signed by   Rona Schmitz MD, MPH,   Internal Medicine Hospitalist   2/22/2021 12:39 PM      Thank you Matty Mcdonnell DO for the opportunity to be involved in this patient's care.  If you have any questions or concerns please feel free to contact me at [phone]        EMR Dragon/Transcription disclaimer:   Much of this encounter note is an electronic transcription/translation of spoken language to printed text.  The electronic translation of spoken language may permit erroneous, or at times, nonsensical words or phrases to be inadvertently transcribed; although attempts have made to review the note for such errors, some may still exist.

## 2021-02-22 NOTE — PROGRESS NOTES
Activity limitations Decreased functional mobility ; Decreased ROM; Decreased strength;Decreased safe awareness;Decreased cognition;Decreased endurance;Decreased posture; Increased pain;Decreased balance;Decreased coordination   Assessment Pt showing improvement in STS capability and amb this tx, shows improvement in ability to advance LLE while amb but does fatgiue quickly and requires chair brought up behind him. Pt in recliner with all needs in reach following tx. Activity Tolerance   Activity Tolerance Patient Tolerated treatment well;Patient limited by fatigue;Patient limited by endurance   Safety Devices   Type of devices Call light within reach; Chair alarm in place;Gait belt;Left in chair         Electronically signed by Brianna Weeks PTA on 2/22/2021 at 10:07 AM

## 2021-02-22 NOTE — PLAN OF CARE
[facility] TREATMENT PLAN      Brook Escobar    : 1945  Acct #: [de-identified]  MRN: [id number]   PHYSICIAN:  Carlos Mcqueen MD  Primary Problem    Patient Active Problem List   Diagnosis    Hip fracture requiring operative repair, left, closed, initial encounter ([facility]    Hyperglycemia    Essential hypertension    Fall from slipping on ice    Altered mental state    Smoker    Pain in hip    Gait abnormality    Acute ischemic stroke ([facility]    Vitamin B12 deficiency    Other hyperlipidemia    Other insomnia       Rehabilitation Diagnosis:     CVA (cerebral vascular accident) ([facility] [I63.9]  Acute ischemic stroke ([facility] [I63.9]       ADMIT DATE:2021   CARE PLAN     NURSING:  Brook Escobar while on this unit will:     [] Be continent of bowel and bladder      [x] Have an adequate number of bowel movements   [] Urinate with no urinary retention >300ml in bladder   [] Complete bladder protocol with dawkins removal   [] Maintain O2 SATs at ___%   [x] Have pain managed while on ARU        [x] Be pain free by discharge    [x] Have no skin breakdown while on ARU   [] Have improved skin integrity via wound measurements   [x] Have no signs/symptoms of infection at the wound site  [x] Be free from injury during hospitalization   [] Complete education with patient/family with understanding demonstrated for:  [] Adjustment   [] Other:   Nursing interventions may include bowel/bladder training, education for medical assistive devices, medication education, O2 saturation management, energy conservation, stress management techniques, fall prevention, alarms protocol, seating and positioning, skin/wound care, pressure relief instruction,dressing changes,  infection protection, DVT prophylaxis, and/or assistance with in room safety with transfers to bed, toilet, wheelchair, shower as well as bathroom activities and hygiene.      Patient/caregiver education for:   [] Disease/sustained injury/management Assistance Needed: Partial/moderate assistance  CARE Score: 3  Discharge Goal: Independent  Car Transfer  Assistance Needed: Partial/moderate assistance  CARE Score: 3  Discharge Goal: Supervision or touching assistance  Walk 10 Feet  Reason if not Attempted: Not attempted due to medical condition or safety concerns  CARE Score: 88  Discharge Goal: Independent  Walk 50 Feet with Two Turns  Reason if not Attempted: Not attempted due to medical condition or safety concerns  CARE Score: 88  Discharge Goal: Supervision or touching assistance  Walk 150 Feet  Reason if not Attempted: Not attempted due to medical condition or safety concerns  CARE Score: 88  Discharge Goal: Not Attempted  Walking 10 Feet on Uneven Surfaces  Reason if not Attempted: Not attempted due to medical condition or safety concerns  CARE Score: 88  Discharge Goal: Supervision or touching assistance  1 Step (Curb)  Reason if not Attempted: Not attempted due to medical condition or safety concerns  CARE Score: 88  Discharge Goal: Supervision or touching assistance  4 Steps  Reason if not Attempted: Not attempted due to medical condition or safety concerns  CARE Score: 88  Discharge Goal: Not Attempted  12 Steps  Reason if not Attempted: Not attempted due to medical condition or safety concerns  CARE Score: 88  Discharge Goal: Not Attempted  Wheel 50 Feet with Two Turns  Assistance Needed: Supervision or touching assistance  CARE Score: 4  Discharge Goal: Independent  Wheel 150 Feet  Assistance Needed: Supervision or touching assistance  CARE Score: 4  Discharge Goal: Independent    OCCUPATIONAL THERAPY:  Goals:             Short term goals  Time Frame for Short term goals: 1 week  Short term goal 1: Supervision with bathing hygiene. Short term goal 2: Supervision with clothing management/hygiene for toileting. Short term goal 3: Supervision with toilet transfers. Short term goal 4: Supervision with LB dressing using AE. Short term goal 5: Supervision with one-two handed static standing task for 3 minutes. :  Long term goals  Time Frame for Long term goals : 2 weeks  Long term goal 1: Modified independent with bathing hygiene. Long term goal 2: Modified independent with toileting and toilet transfers. Long term goal 3: Modified independent with overall dressing. Long term goal 4: Patient verbalize DME needs. :    These goals were reviewed with this patient at the time of assessment and Eveleen Kin is in agreement    Plan of Care: Frequency:   [x] 5 days per week, 90 minutes per day     [] 5 days per week, 60 minutes per day                Plan  Current Treatment Recommendations: Strengthening, Balance Training, Functional Mobility Training, Patient/Caregiver Education & Training, Equipment Evaluation, Education, & procurement, Safety Education & Training, Self-Care / ADL, Endurance Training            IRF-FENG  Eating  Assistance Needed: Setup or clean-up assistance  CARE Score: 5  Discharge Goal: Independent  Oral Hygiene  Assistance Needed: Setup or clean-up assistance  CARE Score: 5  Discharge Goal: Independent  Toileting Hygiene  Assistance Needed: Substantial/maximal assistance  CARE Score: 2  Discharge Goal: Independent  Shower/Bathe Self  Assistance Needed: Substantial/maximal assistance  CARE Score: 2  Discharge Goal: Independent  Upper Body Dressing  Assistance Needed: Setup or clean-up assistance  CARE Score: 5  Discharge Goal: Independent  Lower Body Dressing  Assistance Needed: Substantial/maximal assistance  Comment: Pt stated, I will be able to do this when I go home. Attempted to teach pt on using AE.   CARE Score: 2  Discharge Goal: Independent  Putting On/Taking Off Footwear  Assistance Needed: Dependent  CARE Score: 1  Discharge Goal: Independent  Toilet Transfer  Assistance Needed: Partial/moderate assistance  CARE Score: 3  Discharge Goal: Independent  Picking Up Object  Assistance Needed: Dependent CARE Score: 1  Discharge Goal: Independent  Treatments may include IADL retraining, strengthening, safety education and training, patient/caregiver education and training, equipment evaluation/ training/procurement, neuromuscular reeducation, wheelchair mobility training. SPEECH THERAPY:   Plan of Care and Goals:   LTG    FIM Goals: Comprehension:    Expression:    Social:    Problem Solving:    Memory:                                                  IRF-FENG  Hearing, Speech, and Vision  Expression of Ideas and Wants: Without difficulty  Understanding Verbal and Non-Verbal Content: Understands  Cognitive Patterns  Cognitive Pattern Assessment Used: BIMS  Repetition of Three Words (First Attempt): 3  Temporal Orientation: Year: Missed by 2 - 5 years  Temporal Orientation: Month: Missed by 6 days to 1 month  Temporal Orientation: Day: Incorrect or no answer  Able to recall \"sock: No, could not recall  Able to recall \"blue\": Yes, no cue required  Able to recall \"bed\": No, could not recall  BIMS Summary Score: 7          Plan of Care:  Frequency:   [] 5 days per week, 60 minutes per day                            [x] Not appropriate for Speech Therapy  Treatments may include speech/language/communication therapy, cognitive training, group therapy, education, and/or dysphagia therapy based on the above goals. These goals were reviewed with this patient at the time of assessment and [place] is in agreement. CASE MANAGEMENT:  Goals:   Assist patient/family with discharge planning, patient/family counseling,  and coordination with insurance during ARU stay.   Patient Goals: get up out of this bed, get better and go home soon               Activities Prior to Admit:      Active : Yes  Mode of Transportation: Truck  Occupation: Self employed, Full time employment  Leisure & Hobbies: Dirt car racing 1. Acute ischemic stroke-ASA/statin  2. Hyperlipidemia-on statin  3. Vitamin B12 deficiency-on Vitamin B12  4. HTN-on meds monitor   5. Smoker-on nicoderm patch  6. GI-bowel regimen  7. Pain control -Percocet prn  8. S/p left hip surgery-WBAT  9. DVT prophylaxis-Lovenox  10. Encephalopathy-monitor  11. PT/OT  12.  Insomnia-melatonin            Case Mgmt: Electronically signed by NEGIN Wells on 2/23/2021 at 10:22 AM      OT: Electronically signed by Eleanor Clark OT on 2/23/21 at 12:57 PM CST    PT:Electronically signed by Lino King, PT on 2/23/2021 at 3:38 PM      RN: Electronically signed by Partha Noonan, RN on 2/22/21 at 4:04 PM CST      ST: Electronically signed by Elena Ferreira, ROSE on 2/23/2021 at 2:12 PM

## 2021-02-22 NOTE — PROGRESS NOTES
Cruzito Reyes arrived to room # 600 [id number]. Presented with: CVA  Mental Status: Patient is oriented and alert. Vitals:    02/22/21 1556   BP: 118/72   Pulse: 86   Resp: 16   Temp: 97.8 °F (36.6 °C)   SpO2: 92%     Patient safety contract and falls prevention contract reviewed with patient Yes. Oriented Patient to room. Call light within reach. Yes.   Needs, issues or concerns expressed at this time: no.      Electronically signed by Rufino Washington RN on 2/22/2021 at 4:05 PM

## 2021-02-22 NOTE — PROGRESS NOTES
4 Eyes Skin Assessment    Chauncey Lowry is being assessed upon: Admission    I agree that I, Elian Mays, along with Nima Backbone RN (either 2 RN's or 1 LPN and 1 RN) have performed a thorough Head to Toe Skin Assessment on the patient. ALL assessment sites listed below have been assessed. Areas assessed by both nurses:     [x]   Head, Face, and Ears   [x]   Shoulders, Back, and Chest  [x]   Arms, Elbows, and Hands   [x]   Coccyx, Sacrum, and Ischium  [x]   Legs, Feet, and Heels    Does the Patient have Skin Breakdown?  No    Wyatt Prevention initiated: No  Wound Care Orders initiated: No    WOC nurse consulted for Pressure Injury (Stage 3,4, Unstageable, DTI, NWPT, and Complex wounds) and New or Established Ostomies: No        Primary Nurse eSignature: Elian Mays RN on 2/22/2021 at 5:05 PM      Co-Signer eSignature: Electronically signed by Vijay Magdaleno RN on 2/22/21 at 5:27 PM CST

## 2021-02-23 LAB
ALBUMIN SERPL-MCNC: 3 G/DL (ref 3.5–5.2)
ALP BLD-CCNC: 108 U/L (ref 40–130)
ALT SERPL-CCNC: 24 U/L (ref 5–41)
ANION GAP SERPL CALCULATED.3IONS-SCNC: 8 MMOL/L (ref 7–19)
AST SERPL-CCNC: 20 U/L (ref 5–40)
BILIRUB SERPL-MCNC: 0.9 MG/DL (ref 0.2–1.2)
BUN BLDV-MCNC: 29 MG/DL (ref 8–23)
CALCIUM SERPL-MCNC: 8.6 MG/DL (ref 8.8–10.2)
CHLORIDE BLD-SCNC: 99 MMOL/L (ref 98–111)
CO2: 29 MMOL/L (ref 22–29)
CREAT SERPL-MCNC: 0.7 MG/DL (ref 0.5–1.2)
GFR AFRICAN AMERICAN: >59
GFR NON-AFRICAN AMERICAN: >60
GLUCOSE BLD-MCNC: 101 MG/DL (ref 74–109)
GLUCOSE BLD-MCNC: 114 MG/DL (ref 70–99)
GLUCOSE BLD-MCNC: 120 MG/DL (ref 70–99)
GLUCOSE BLD-MCNC: 123 MG/DL (ref 70–99)
GLUCOSE BLD-MCNC: 140 MG/DL (ref 70–99)
HCT VFR BLD CALC: 28.9 % (ref 42–52)
HEMOGLOBIN: 9.2 G/DL (ref 14–18)
MCH RBC QN AUTO: 31 PG (ref 27–31)
MCHC RBC AUTO-ENTMCNC: 31.8 G/DL (ref 33–37)
MCV RBC AUTO: 97.3 FL (ref 80–94)
PDW BLD-RTO: 12.3 % (ref 11.5–14.5)
PERFORMED ON: ABNORMAL
PLATELET # BLD: 264 K/UL (ref 130–400)
PMV BLD AUTO: 10.7 FL (ref 9.4–12.4)
POTASSIUM SERPL-SCNC: 3.9 MMOL/L (ref 3.5–5)
PREALBUMIN: 9 MG/DL (ref 20–40)
RBC # BLD: 2.97 M/UL (ref 4.7–6.1)
SODIUM BLD-SCNC: 136 MMOL/L (ref 136–145)
TOTAL PROTEIN: 5.9 G/DL (ref 6.6–8.7)
WBC # BLD: 8.4 K/UL (ref 4.8–10.8)

## 2021-02-23 PROCEDURE — 80053 COMPREHEN METABOLIC PANEL: CPT

## 2021-02-23 PROCEDURE — 97530 THERAPEUTIC ACTIVITIES: CPT

## 2021-02-23 PROCEDURE — 2580000003 HC RX 258: Performed by: INTERNAL MEDICINE

## 2021-02-23 PROCEDURE — 97165 OT EVAL LOW COMPLEX 30 MIN: CPT

## 2021-02-23 PROCEDURE — 85027 COMPLETE CBC AUTOMATED: CPT

## 2021-02-23 PROCEDURE — 97161 PT EVAL LOW COMPLEX 20 MIN: CPT

## 2021-02-23 PROCEDURE — 1180000000 HC REHAB R&B

## 2021-02-23 PROCEDURE — 97116 GAIT TRAINING THERAPY: CPT

## 2021-02-23 PROCEDURE — 97110 THERAPEUTIC EXERCISES: CPT

## 2021-02-23 PROCEDURE — 84134 ASSAY OF PREALBUMIN: CPT

## 2021-02-23 PROCEDURE — 97535 SELF CARE MNGMENT TRAINING: CPT

## 2021-02-23 PROCEDURE — 6370000000 HC RX 637 (ALT 250 FOR IP): Performed by: INTERNAL MEDICINE

## 2021-02-23 PROCEDURE — 82947 ASSAY GLUCOSE BLOOD QUANT: CPT

## 2021-02-23 PROCEDURE — 6370000000 HC RX 637 (ALT 250 FOR IP): Performed by: PSYCHIATRY & NEUROLOGY

## 2021-02-23 PROCEDURE — 6360000002 HC RX W HCPCS: Performed by: INTERNAL MEDICINE

## 2021-02-23 PROCEDURE — 99223 1ST HOSP IP/OBS HIGH 75: CPT | Performed by: PSYCHIATRY & NEUROLOGY

## 2021-02-23 PROCEDURE — 36415 COLL VENOUS BLD VENIPUNCTURE: CPT

## 2021-02-23 RX ADMIN — Medication 5 ML: at 14:57

## 2021-02-23 RX ADMIN — ENOXAPARIN SODIUM 40 MG: 100 INJECTION SUBCUTANEOUS at 08:35

## 2021-02-23 RX ADMIN — Medication 5 MG: at 21:11

## 2021-02-23 RX ADMIN — FOLIC ACID 1 MG: 1 TABLET ORAL at 08:35

## 2021-02-23 RX ADMIN — SODIUM CHLORIDE, PRESERVATIVE FREE 10 ML: 5 INJECTION INTRAVENOUS at 21:11

## 2021-02-23 RX ADMIN — LISINOPRIL 20 MG: 20 TABLET ORAL at 08:35

## 2021-02-23 RX ADMIN — NYSTATIN 500000 UNITS: 100000 SUSPENSION ORAL at 17:53

## 2021-02-23 RX ADMIN — NYSTATIN 500000 UNITS: 100000 SUSPENSION ORAL at 12:29

## 2021-02-23 RX ADMIN — ONDANSETRON 4 MG: 2 INJECTION INTRAMUSCULAR; INTRAVENOUS at 19:51

## 2021-02-23 RX ADMIN — OXYCODONE HYDROCHLORIDE AND ACETAMINOPHEN 1 TABLET: 5; 325 TABLET ORAL at 21:11

## 2021-02-23 RX ADMIN — THERA TABS 1 TABLET: TAB at 08:35

## 2021-02-23 RX ADMIN — Medication 81 MG: at 08:35

## 2021-02-23 RX ADMIN — DOCUSATE SODIUM 50 MG AND SENNOSIDES 8.6 MG 1 TABLET: 8.6; 5 TABLET, FILM COATED ORAL at 08:35

## 2021-02-23 RX ADMIN — SODIUM CHLORIDE, PRESERVATIVE FREE 10 ML: 5 INJECTION INTRAVENOUS at 08:36

## 2021-02-23 RX ADMIN — ATORVASTATIN CALCIUM 20 MG: 20 TABLET, FILM COATED ORAL at 21:11

## 2021-02-23 RX ADMIN — HYDROCHLOROTHIAZIDE 25 MG: 25 TABLET ORAL at 08:35

## 2021-02-23 RX ADMIN — Medication 5 ML: at 11:11

## 2021-02-23 RX ADMIN — ACETAMINOPHEN 650 MG: 325 TABLET ORAL at 14:59

## 2021-02-23 RX ADMIN — DOCUSATE SODIUM 50 MG AND SENNOSIDES 8.6 MG 1 TABLET: 8.6; 5 TABLET, FILM COATED ORAL at 21:11

## 2021-02-23 ASSESSMENT — PAIN DESCRIPTION - DESCRIPTORS: DESCRIPTORS: ACHING

## 2021-02-23 ASSESSMENT — PAIN DESCRIPTION - PROGRESSION
CLINICAL_PROGRESSION: GRADUALLY IMPROVING
CLINICAL_PROGRESSION: GRADUALLY IMPROVING

## 2021-02-23 ASSESSMENT — PAIN DESCRIPTION - LOCATION: LOCATION: HIP

## 2021-02-23 ASSESSMENT — PAIN SCALES - GENERAL: PAINLEVEL_OUTOF10: 6

## 2021-02-23 NOTE — PROGRESS NOTES
Occupational Therapy   Occupational Therapy Initial Assessment  Date: 2021   Patient Name: Dina Jolley  MRN: [id number]     : 1945    Date of Service: 2021    Discharge Recommendations:  Home with Home health OT       Assessment     Decreased functional mobility ; Decreased ADL status; Decreased strength; Decreased endurance  Pt requires increased assistance with ADLs due to decreased balance/strength. He requires skilled OT to address the above deficits and return the pt home independently with assist from his wife. Safety Devices  Safety Devices in place: Yes(Left with PT)  Type of devices: Call light within reach; Left in chair           Patient Diagnosis(es): There were no encounter diagnoses. has a past medical history of Cerebral artery occlusion with cerebral infarction ([facility] and Hypertension. has a past surgical history that includes Femur fracture surgery (Left, 2021).     Treatment Diagnosis: Left IT hip fx s/p cephalomedullary nail      Restrictions  Restrictions/Precautions  Restrictions/Precautions: Fall Risk, Weight Bearing  Required Braces or Orthoses?: No  Lower Extremity Weight Bearing Restrictions  Left Lower Extremity Weight Bearing: Weight Bearing As Tolerated    Subjective   General  Chart Reviewed: Yes  Patient assessed for rehabilitation services?: Yes  Pain Assessment  Pain Level: 3  Vital Signs  Level of Consciousness: Alert (0)  Social/Functional History  Social/Functional History  Lives With: Spouse  Type of Home: House  Home Layout: One level  Home Access: Stairs to enter with rails  Entrance Stairs - Number of Steps: 3  Entrance Stairs - Rails: Both  Bathroom Shower/Tub: Tub/Shower unit  Bathroom Toilet: Standard  Bathroom Accessibility: Wheelchair accessible  Home Equipment: (NONE)  ADL Assistance: Independent  Homemaking Assistance: Independent  Ambulation Assistance: Independent  Transfer Assistance: Independent  Active : Yes  Mode of Transportation: Truck  Occupation: Self employed, Full time employment  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        Objective   Vision: Impaired  Vision Exceptions: Wears glasses for reading  Hearing: Within functional limits            02/23/21 0900   Transfers   Stand Step Transfers Moderate assistance   Sit to stand Moderate assistance   Stand to sit Moderate assistance   Transfer Comments bed>w/c using RW          02/23/21 0900   Balance   Sitting Balance Stand by assistance   Standing Balance Moderate assistance        Plan   Plan  Current Treatment Recommendations: Strengthening, Balance Training, Functional Mobility Training, Patient/Caregiver Education & Training, Equipment Evaluation, Education, & procurement, Safety Education & Training, Self-Care / ADL, Endurance Training         OutComes Score    Eating  Assistance Needed: Setup or clean-up assistance  CARE Score: 5  Discharge Goal: Independent    Oral Hygiene  Assistance Needed: Setup or clean-up assistance  CARE Score: 5  Discharge Goal: Independent     Toileting Hygiene  Assistance Needed: Substantial/maximal assistance  CARE Score: 2  Discharge Goal: Independent     Shower/Bathe Self  Assistance Needed: Substantial/maximal assistance  CARE Score: 2  Discharge Goal: Independent     Upper Body Dressing  Assistance Needed: Setup or clean-up assistance  CARE Score: 5  Discharge Goal: Independent     Lower Body Dressing  Assistance Needed: Dependent  CARE Score: 1  Discharge Goal: Independent     Putting On/Taking Off Footwear  Assistance Needed: Dependent  CARE Score: 1  Discharge Goal: Independent     Toilet Transfer  Assistance Needed: Partial/moderate assistance  CARE Score: 3  Discharge Goal: Independent     Picking Up Object  Assistance Needed: Dependent  CARE Score: 1  Discharge Goal: Independent       Goals  Short term goals  Time Frame for Short term goals: 1 week  Short term goal 1: Supervision with bathing hygiene. Short term goal 2: Supervision with clothing management/hygiene for toileting. Short term goal 3: Supervision with toilet transfers. Short term goal 4: Supervision with LB dressing using AE. Short term goal 5: Supervision with one-two handed static standing task for 3 minutes. Long term goals  Time Frame for Long term goals : 2 weeks  Long term goal 1: Modified independent with bathing hygiene. Long term goal 2: Modified independent with toileting and toilet transfers. Long term goal 3: Modified independent with overall dressing. Long term goal 4: Patient verbalize DME needs. Patient Goals   Patient goals : Return home independently.        Therapy Time   Individual Concurrent Group Co-treatment   Time In 0900         Time Out 1000         Minutes 60         Timed Code Treatment Minutes: [address], OT

## 2021-02-23 NOTE — PLAN OF CARE
Nutrition Problem #1: Inadequate oral intake  Intervention: Food and/or Nutrient Delivery: Continue Current Diet  Nutritional Goals: Pt will consume 75% or greater of meals.

## 2021-02-23 NOTE — PROGRESS NOTES
Physician Progress Note      PATIENT:               Megan Gilman  CSN #:                  [id number]  :                       1945  ADMIT DATE:       2021 11:14 AM  DISCH DATE:        2021 3:42 PM  RESPONDING  PROVIDER #:        Beverly Kern MD          QUERY TEXT:    Patient admitted with fractured left femur. Documentation reflects   encephalopathy in Dr. Chichi Webster progress notes  with AMS/delirium in dc   summary. If possible, please document in the progress notes and discharge   summary if encephalopathy was: The medical record reflects the following:  Risk Factors: s/p cephalomedullary nailing left hip fracture  Clinical Indicators: intermittent confusion, seeing bugs, refuses to cooperate   with physical exam, per neuro \"confusion which appears more of an   encephalopathy\"  Treatment: neuro consult, EEG, CT head, MRI brain, Ativan, sitter at bedside    Thank you,  Jane Mayen, CDS  [phone]  Options provided:  -- Encephalopathy treated and resolved  -- Encephalopathy ruled out after study  -- Other - I will add my own diagnosis  -- Disagree - Not applicable / Not valid  -- Disagree - Clinically unable to determine / Unknown  -- Refer to Clinical Documentation Reviewer    PROVIDER RESPONSE TEXT:    Encephalopathy treated and resolved.     Query created by: Heather Bautista on 2021 12:01 PM      Electronically signed by:  Beverly Kern MD 2021 4:41 PM

## 2021-02-23 NOTE — PROGRESS NOTES
Comprehensive Nutrition Assessment    Type and Reason for Visit:  Initial    Nutrition Recommendations/Plan: Continue current POC. Nutrition Assessment:  Following for new admission to inpatient rehab. Pt reports fair appetite and not liking food, observed <25% of lunch tray consumed today. Provided pt with menu and made aware of his options. No preferences voiced at this time. Will monitor intakes and wt. Malnutrition Assessment:  Malnutrition Status: At risk for malnutrition (Comment)    Context:  Acute Illness     Findings of the 6 clinical characteristics of malnutrition:  Energy Intake:  Mild decrease in energy intake (Comment)  Weight Loss:  No significant weight loss     Body Fat Loss:  No significant body fat loss     Muscle Mass Loss:  No significant muscle mass loss    Fluid Accumulation:  7 - Moderate to Severe     Strength:  Not Performed    Estimated Daily Nutrient Needs:  Energy (kcal):  3337-4715 kcals/day; Weight Used for Energy Requirements:  Current     Protein (g):   g/PRO/day; Weight Used for Protein Requirements:  Ideal(1.2-1.4 g/kg)        Fluid (ml/day):  9918-5043 mL/fluid/day; Method Used for Fluid Requirements:  1 ml/kcal      Nutrition Related Findings:  +2 LLE edema      Wounds:  Surgical Incision       Current Nutrition Therapies:    DIET GENERAL; Anthropometric Measures:  · Height: 6' (182.9 cm)  · Current Body Weight: 195 lb (88.5 kg)   · Admission Body Weight: 195 lb (88.5 kg)      · Ideal Body Weight: 178 lbs  · BMI: 26.4   · BMI Categories: Overweight (BMI 25.0-29. 9)       Nutrition Diagnosis:   · Inadequate oral intake related to increase demand for energy/nutrients as evidenced by intake 0-25%, intake 26-50%, intake 51-75%, localized or generalized fluid accumulation    Nutrition Interventions:   Food and/or Nutrient Delivery:  Continue Current Diet  Nutrition Education/Counseling:  No recommendation at this time Coordination of Nutrition Care:  Continue to monitor while inpatient    Goals:  Pt will consume 75% or greater of meals.        Nutrition Monitoring and Evaluation:   Food/Nutrient Intake Outcomes:  Food and Nutrient Intake  Physical Signs/Symptoms Outcomes:  Biochemical Data, Nutrition Focused Physical Findings, Weight, Skin, Fluid Status or Edema     Electronically signed by Brendon Johnson MS, RD, LD on 2/23/21 at 1:12 PM CST    Contact: [phone]

## 2021-02-23 NOTE — PROGRESS NOTES
Occupational Therapy  Facility/Department: [facility] 8 REHAB UNIT  Daily Treatment Note  NAME: Didi Bean  : 1945  MRN: [id number]    Date of Service: 2021    Discharge Recommendations:  Home with Home health OT       Assessment   Performance deficits / Impairments: Decreased functional mobility ; Decreased ADL status; Decreased strength;Decreased endurance;Decreased high-level IADLs  Treatment Diagnosis: Left IT hip fx s/p cephalomedullary nail  Activity Tolerance  Activity Tolerance: Patient Tolerated treatment well  Safety Devices  Safety Devices in place: Yes  Type of devices: Bed alarm in place;Call light within reach         Patient Diagnosis(es): There were no encounter diagnoses. has a past medical history of Cerebral artery occlusion with cerebral infarction ([facility] [address] and Hypertension. has a past surgical history that includes Femur fracture surgery (Left, 2021). Restrictions  Restrictions/Precautions  Restrictions/Precautions: Fall Risk, Weight Bearing  Required Braces or Orthoses?: No  Lower Extremity Weight Bearing Restrictions  Left Lower Extremity Weight Bearing: Weight Bearing As Tolerated  Subjective   General  Chart Reviewed: Yes  Patient assessed for rehabilitation services?: Yes      Orientation  Orientation  Overall Orientation Status: Impaired  Orientation Level: Oriented to person;Disoriented to time(States its 2019 and unable to state month or day of the week)  Objective    Balance  Sitting Balance: Stand by assistance  Standing Balance: Minimal assistance  Functional Mobility  Functional - Mobility Device: Rolling Walker  Activity: Other  Assist Level: Minimal assistance  Functional Mobility Comments: Short distance in room  Bed mobility  Supine to Sit: Moderate assistance  Transfers  Stand Step Transfers:  Moderate assistance  Sit to stand: Minimal assistance  Stand to sit: Minimal assistance  Transfer Comments: bed>w/c using [address] [employer] Current Treatment Recommendations: Strengthening, Balance Training, Functional Mobility Training, Patient/Caregiver Education & Training, Equipment Evaluation, Education, & procurement, Safety Education & Training, Self-Care / ADL, Endurance Training       OutComes Score       02/23/21 1445   Lower Body Dressing   Assistance Needed Substantial/maximal assistance   Comment Pt stated, I will be able to do this when I go home. Attempted to teach pt on using AE. CARE Score 2       Goals  Short term goals  Time Frame for Short term goals: 1 week  Short term goal 1: Supervision with bathing hygiene. Short term goal 2: Supervision with clothing management/hygiene for toileting. Short term goal 3: Supervision with toilet transfers. Short term goal 4: Supervision with LB dressing using AE. Short term goal 5: Supervision with one-two handed static standing task for 3 minutes. Long term goals  Time Frame for Long term goals : 2 weeks  Long term goal 1: Modified independent with bathing hygiene. Long term goal 2: Modified independent with toileting and toilet transfers. Long term goal 3: Modified independent with overall dressing. Long term goal 4: Patient verbalize DME needs. Patient Goals   Patient goals : Return home independently.        Therapy Time   Individual Concurrent Group Co-treatment   Time In 7131         Time Out 1515         Minutes 30         Timed Code Treatment Minutes: Minal Crist[address], OT

## 2021-02-23 NOTE — PROGRESS NOTES
02/23/21 8745   Restrictions/Precautions   Restrictions/Precautions Fall Risk;Weight Bearing   Required Braces or Orthoses? No   Lower Extremity Weight Bearing Restrictions   Left Lower Extremity Weight Bearing Weight Bearing As Tolerated   General   Diagnosis Cerebral infarction, left body involvement (right brain)   Other (Comment) pt impulsive and demanding   Vital Signs   Level of Consciousness Alert (0)   Bed Mobility   Rolling Minimal assistance   Supine to Sit Minimal assistance   Transfers   Sit to Stand Contact guard assistance   Stand to sit Contact guard assistance   Exercises   Comments AAROM L LE, 3 x 10 reps   Other exercises   Other exercises? Yes   Other exercises 1 Ball squeezes, 3 x 10 reps   Other exercises 2 Hip abd. w/ red theraband, 3 x 10 reps   Conditions Requiring Skilled Therapeutic Intervention   Body structures, Functions, Activity limitations Decreased functional mobility ; Decreased ROM; Decreased strength;Decreased safe awareness;Decreased cognition;Decreased endurance;Decreased posture; Increased pain;Decreased balance;Decreased coordination   Assessment Pt. declined when asked to ambulate again this afternoon. Said he would rather do exercises in chair. Had good tolerance for ther ex., but L LE requires AAROM. Activity Tolerance   Activity Tolerance Patient limited by endurance; Patient limited by pain; Patient Tolerated treatment well   Safety Devices   Type of devices Patient at risk for falls; Chair alarm in place; Left in chair;Gait belt  (Left with OT.)

## 2021-02-23 NOTE — PLAN OF CARE
Problem: Falls - Risk of:  Goal: Will remain free from falls  Description: Will remain free from falls  Outcome: Ongoing  Goal: Absence of physical injury  Description: Absence of physical injury  Outcome: Ongoing   Up with 1-2 assist with walker

## 2021-02-23 NOTE — PROGRESS NOTES
Physical Therapy Evaluation Note  DATE:  2021  NAME:  José Mishra  :  1945  (75 y.o.,male)  MRN:  [id number]    HEIGHT:  Height: 6' (182.9 cm)  WEIGHT:  Weight: 195 lb (88.5 kg)    PATIENT DIAGNOSIS(ES):    Diagnosis: Cerebral infarction, left body involvement (right brain)    Additional Pertinent Hx: primary HTN, hyperglycemia, displaced intertrochanteric fx of L femur, nicotine dependence (cigarettes), encephalopathy, TIA, cerebral artery occlusion  RESTRICTIONS/PRECAUTIONS:    Restrictions/Precautions  Restrictions/Precautions: Fall Risk, Weight Bearing  Required Braces or Orthoses?: No  OVERALL  ORIENTATION STATUS:  Overall Orientation Status: Within Functional Limits  PAIN:  Pain Level: 10(Pt. is sitting and speaking calmly. All vitals are normal.)  Pain Type: Acute pain    Pain Location: Hip     Pain Orientation: Left      STRENGTH  Strength RLE  Strength RLE: WNL  Strength LLE  Strength LLE: Exception  Comment: ankle 3+/5, knee and hip grossly 2-/5  ROM  AROM RLE (degrees)  RLE AROM: WNL  POSTURE/BALANCE  Balance  Sitting - Static: Fair  Standing - Dynamic: Poor       ACTIVITY TOLERANCE  Activity Tolerance  Activity Tolerance: Patient limited by endurance, Patient limited by pain, Patient limited by cognitive status      BED MOBILITY  Bed Mobility  Rolling: Minimal assistance  Supine to Sit: Minimal assistance  Sit to Supine: Moderate assistance  Scooting: Moderate assistance  TRANSFERS  Sit to Stand: Minimal Assistance(Pt. keeps leaning backward)      Bed to Chair: Moderate assistance  Car Transfer:  Moderate Assistance(Required mod assist for L leg TF, did not follow VCs)     WHEELCHAIR PROPULSION 1  Propulsion 1  Propulsion: Manual  Level: Level Tile  Method: CHERELLE COE  Level of Assistance: Stand by assistance  Description/ Details: Incorporated turns  Distance: 150'  AMBULATION 1  Ambulation 1  Surface: level tile  Device: Parallel Bars  Assistance: Minimal assistance Quality of Gait: antalgic, unsteady. Pt. has trouble advancing LLE forward. Gait Deviations: Slow Kayleen, Decreased step length, Decreased step height  Distance: 5' x 2   STAIRS:  Did not perform due to safety concerns  GOALS:  Short term goals  Time Frame for Short term goals: 1 week  Short term goal 1: Car TF min assist  Short term goal 2: Sit<>Stand contact guard  Short term goal 3: L LE grossly 3/5 on MMT  Short term goal 4: Ambulate 12' x 2 in parallel bars   Short term goal 5: Propel 150' in WC independent. Long term goals  Time Frame for Long term goals : 2-3 weeks  Long term goal 1: Car TF SBA  Long term goal 2: Bed mobility SBA/independent  Long term goal 3: Sit<>Supine SBA/Independent  Long term goal 4: Ambulate 48' in RW with SBA  Long term goal 5: Perform 3 stairs with SBA  HOME LIVING:  Type of Home: House  Home Layout: One level  Home Access: Stairs to enter with rails  Entrance Stairs - Number of Steps: 3  Entrance Stairs - Rails: Both  ASSESSMENT (IMPAIRMENTS/BARRIERS): Body structures, Functions, Activity limitations: Decreased functional mobility , Decreased ROM, Decreased strength, Decreased safe awareness, Decreased cognition, Decreased endurance, Decreased posture, Increased pain, Decreased balance, Decreased coordination  Assessment: Pt. had decreased strength of 2-/5 in L hip and knee. Most likely a combination of pain from hip fx and weakness from suspected stroke. Pt. was impulsive, and did not listen to VCs for safety and technique during TFs, bed mobility, and ambulation. Pt. requires min-mod assist for help with L LE during all bed mobility, TFs, and ambulation. Pt balance in standing is poor; has tendency to lean posteriorly and to R side. Pt. only able to amb. a few feet, will required assist for L LE advancement. Pt. is proficient w/ WC propulsion.   Activity Tolerance: Patient limited by endurance, Patient limited by pain, Patient limited by cognitive status  PLAN:  Plan Walk 150 Feet  Reason if not Attempted: Not attempted due to medical condition or safety concerns  CARE Score: 88  Discharge Goal: Not Attempted    Walking 10 Feet on Uneven Surfaces  Reason if not Attempted: Not attempted due to medical condition or safety concerns  CARE Score: 88  Discharge Goal: Supervision or touching assistance    1 Step (Curb)  Reason if not Attempted: Not attempted due to medical condition or safety concerns  CARE Score: 88  Discharge Goal: Supervision or touching assistance    4 Steps  Reason if not Attempted: Not attempted due to medical condition or safety concerns  CARE Score: 88  Discharge Goal: Not Attempted    12 Steps  Reason if not Attempted: Not attempted due to medical condition or safety concerns  CARE Score: 88  Discharge Goal: Not Attempted    Wheel 50 Feet with Two Turns  Assistance Needed: Supervision or touching assistance  CARE Score: 4  Discharge Goal: Independent    Wheel 150 Feet  Assistance Needed: Supervision or touching assistance  CARE Score: 4  Discharge Goal: Independent      LAST TREATMENT TIME  PT Individual Minutes  Time In: 1000  Time Out: 1100  Minutes: 60

## 2021-02-23 NOTE — H&P
[facility]   History and Physical        Patient:   Cosme Richey  MR#:    [id number]  Account Number:                   [id number]      Room:    0813/813-01   YOB: 1945  Date of Progress Note: 2/23/2021  Time of Note                           7:57 AM  Attending Physician:  Allison Jones MD        Admitting diagnosis:Cerebral infarction, unspecified    Secondary diagnoses:Essential (primary) hypertension,Hyperglycemia, unspecified,Displaced intertrochanteric fracture of left femur, initial encounter for closed fracture,Nicotine dependence, cigarettes, uncomplicated,Encephalopathy, unspecified    CHIEF COMPLAINT:  Acute ischemic stroke/left hip fracture and repair         HISTORY OF PRESENT ILLNESS: Hematologic  no easy bruising or excessive bleeding. Psychiatric  no severe anxiety or nervousness. All other review of systems are negative.       Past Medical History:      Diagnosis Date    Cerebral artery occlusion with cerebral infarction ([facility] [address]     tia    Hypertension        Past Surgical History:      Procedure Laterality Date    FEMUR FRACTURE SURGERY Left 2/13/2021    FEMUR IM NAIL GILDARDO INSERTION performed by Dayton Rendon MD at [address] [place] [address]       Medications in Hospital:      Current Facility-Administered Medications:     enoxaparin (LOVENOX) injection 40 mg, 40 mg, Subcutaneous, Daily, Taisha Saucedo MD    sennosides-docusate sodium (SENOKOT-S) 8.6-50 MG tablet 1 tablet, 1 tablet, Oral, BID, Taisha Saucedo MD, 1 tablet at 02/22/21 2045    oxyCODONE-acetaminophen (PERCOCET) 5-325 MG per tablet 1 tablet, 1 tablet, Oral, Q6H PRN, Taisha Saucedo MD, 1 tablet at 02/22/21 2045    acetaminophen (TYLENOL) tablet 650 mg, 650 mg, Oral, Q6H PRN **OR** acetaminophen (TYLENOL) suppository 650 mg, 650 mg, Rectal, Q6H PRN, Taisha Saucedo MD    magnesium hydroxide (MILK OF MAGNESIA) 400 MG/5ML suspension 30 mL, 30 mL, Oral, Daily PRN, Taisha Saucedo MD    potassium chloride (KLOR-CON M) extended release tablet 40 mEq, 40 mEq, Oral, PRN **OR** potassium bicarb-citric acid (EFFER-K) effervescent tablet 40 mEq, 40 mEq, Oral, PRN **OR** potassium chloride 10 mEq/100 mL IVPB (Peripheral Line), 10 mEq, Intravenous, PRN, Taisha Saucedo MD    promethazine (PHENERGAN) tablet 12.5 mg, 12.5 mg, Oral, Q6H PRN **OR** ondansetron (ZOFRAN) injection 4 mg, 4 mg, Intravenous, Q6H PRN, Taisha Saucedo MD    sodium chloride flush 0.9 % injection 10 mL, 10 mL, Intravenous, 2 times per day, Taisha Saucedo MD, 10 mL at 02/22/21 2100    sodium chloride flush 0.9 % injection 10 mL, 10 mL, Intravenous, PRN, Taisha Saucedo MD   dextrose 5 % solution, 100 mL/hr, Intravenous, PRN, Clary Lomeli MD    dextrose 50 % IV solution, 12.5 g, Intravenous, PRN, Clary Lomeli MD    glucagon (rDNA) injection 1 mg, 1 mg, Intramuscular, PRN, Clary Lomeli MD    glucose (GLUTOSE) 40 % oral gel 15 g, 15 g, Oral, PRN, Clary Lomeli MD    aspirin EC tablet 81 mg, 81 mg, Oral, Daily, Clary Lomeli MD    atorvastatin (LIPITOR) tablet 20 mg, 20 mg, Oral, Nightly, Clary Lomeli MD, 20 mg at 02/22/21 2045    [START ON 2/27/2021] cyanocobalamin injection 1,000 mcg, 1,000 mcg, Intramuscular, Q7 Days **AND** [START ON 5/6/2021] cyanocobalamin injection 1,000 mcg, 1,000 mcg, Intramuscular, Q30 Days, Clary Lomeli MD    folic acid (FOLVITE) tablet 1 mg, 1 mg, Oral, Daily, Clary Lomeli MD    insulin lispro (HUMALOG) injection vial 0-6 Units, 0-6 Units, Subcutaneous, TID WC, Clary Lomeli MD    insulin lispro (HUMALOG) injection vial 0-3 Units, 0-3 Units, Subcutaneous, Nightly, Clary Lomeli MD    lisinopril (PRINIVIL;ZESTRIL) tablet 20 mg, 20 mg, Oral, Daily **AND** hydroCHLOROthiazide (HYDRODIURIL) tablet 25 mg, 25 mg, Oral, Daily, Clary Lomeli MD    melatonin disintegrating tablet 5 mg, 5 mg, Oral, Nightly, Clary Lomeli MD, 5 mg at 02/22/21 2045    multivitamin 1 tablet, 1 tablet, Oral, Daily, Clary Lomeli MD    nicotine (NICODERM CQ) 21 MG/24HR 1 patch, 1 patch, Transdermal, Daily, Clary Lomeli MD    traZODone (DESYREL) tablet 50 mg, 50 mg, Oral, Nightly PRN, Clary Lomeli MD    acetaminophen (TYLENOL) tablet 650 mg, 650 mg, Oral, Q4H PRN, Jenny James MD    polyethylene glycol (GLYCOLAX) packet 17 g, 17 g, Oral, Daily PRN, Jenny James MD    Allergies:  Patient has no known allergies. Social History:   TOBACCO:   reports that he has been smoking. He has been smoking about 1.00 pack per day.  He has never used smokeless tobacco. ETOH:   reports no history of alcohol use. Family History:   No family history on file. Physical Exam:    Vitals: /78   Pulse 76   Temp 97.9 °F (36.6 °C) (Temporal)   Resp 16   Ht 6' (1.829 m)   Wt 195 lb (88.5 kg)   SpO2 93%   BMI 26.45 kg/m²     Constitutional  well developed, well nourished. Eyes  conjunctiva normal.  Pupils not tested  Ear, nose, throat -hearing intact to finger rub No scars, masses, or lesions over external nose or ears, no atrophy of tongue  Neck-symmetric, no masses noted, no jugular vein distension  Respiration- chest wall appears symmetric, good expansion,   normal effort without use of accessory muscles  Musculoskeletal  no significant wasting of muscles noted, no bony deformities  Extremities-no clubbing, cyanosis or edema  Skin  warm, dry, and intact. No rash, erythema, or pallor.   Psychiatric  mood, affect, and behavior appear normal.      Neurological exam  Awake, alert, fluent oriented x 3 appropriate affect  Attention and concentration appear appropriate  Recent and remote memory appears unremarkable  Speech normal without dysarthria  No clear issues with language of fund of knowledge    Cranial Nerve Exam   CN II- Visual fields grossly unremarkable  CN III, IV,VI-EOMI, No nystagmus, conjugate eye movements, no ptosis  CN V-sensation intact to LT over face  CN VII-no facial assymetry  CN VIII-Hearing intact to finger rub  CN IX and X- Palate not tested  CN XI-not test shoulder shrug  CN XII-Tongue midline with no fasciculations or fibrillations    Motor Exam  Antigravity right lower extremities bilaterally UE, giveway left arm no cogwheeling, normal tone    Sensory Exam  Sensation intact to light touch and temperature upper and lower extremities bilaterally    Reflexes   Not tested    Tremors- no tremors in hands or head noted    Gait  Not tested    Coordination  Finger to nose-unremarkable        CBC:   Recent Labs     02/21/21  0549 02/22/21 This patient requires close medical supervision for the active management of the ongoing conditions and potential complications stated in the admission note    Intensive rehabilitation nursing: The patient demonstrates the need for 24-hour rehabilitation nursing care for active management of the multiple medical issues documented in the admission note    Appropriate therapy needed: The patient requires the active and ongoing therapeutic intervention of at least 2 therapeutic disciplines, one of which must be physical or occupational therapy and/or speech therapy    Intensive therapy: The patient requires and is reasonably expected to actively participate in at least 3 hours of therapy per day at least 5 days per week, and expected to make measurable improvements that will be of practical value to improve the patient's functional capacity or adaptation to impairments. In addition, therapy treatments will begin within 36 hours from midnight of the day of the patient's admission to the inpatient rehabilitation facility    Expected duration and frequency therapy: 180 minutes per day, 5 days per week    Interdisciplinary team: The patient demonstrates the need for an interdisciplinary team for active management of the following medical issues including ataxia, motor planning, balance, disease management, elimination, endurance, family training, education, independent ADLs, pain management, precautions, range of motion, safety, strength, and transfers    I have reviewed the preadmission screening documents and concur with the findings. I believe the patient meets criteria and is sufficiently stable to allow participation in the program. This requires an intensive level of therapy, close medical supervision, and an interdisciplinary team approach provided through an individualized plan of care.  I approve admitting this patient for an intensive inpatient rehabilitation program. Please feel free to call with any questions   [phone] (cell phone)    Dr Chucky Rey certified in Neurology  Board Certified in Clinical Neurophysiology  Fellowship Trained in [place] [address] Neurophysiology      EMR Dragon/transcription disclaimer:Significant part of this  encounter note is electronic transcription/translation of spoken language to printed text. The electronic translation of spoken language may be erroneous, or at times, nonsensical words or phrases may be inadvertently transcribed.  Although I have reviewed the note for such errors, some may still exist.

## 2021-02-23 NOTE — FLOWSHEET NOTE
02/23/21 1118   Encounter Summary   Services provided to: Patient   Referral/Consult From: Nurse;Family  (Consult:  AdvanceDirectives)   Support System Spouse   Complexity of Encounter Moderate   Length of Encounter 15 minutes   Spiritual/Temple   Type Spiritual support   Assessment Coping  (Pt said \"Days feel slower. Lysbeth Carrel Lysbeth Carrel \"  Talkative.)   Intervention Active listening;Provided reading materials/devotional materials  (Living Will)   Outcome Encouraged     Patient said \"Wife will read,\" referring to [facility] forms. Notified attending RN re: ALISA forms. Spiritual Care to follow.     Electronically signed by Viki Edge MA [facility] on 2/23/2021 at 11:21 AM

## 2021-02-24 LAB
GLUCOSE BLD-MCNC: 108 MG/DL (ref 70–99)
GLUCOSE BLD-MCNC: 111 MG/DL (ref 70–99)
GLUCOSE BLD-MCNC: 120 MG/DL (ref 70–99)
GLUCOSE BLD-MCNC: 154 MG/DL (ref 70–99)
PERFORMED ON: ABNORMAL

## 2021-02-24 PROCEDURE — 99232 SBSQ HOSP IP/OBS MODERATE 35: CPT | Performed by: PSYCHIATRY & NEUROLOGY

## 2021-02-24 PROCEDURE — 97110 THERAPEUTIC EXERCISES: CPT

## 2021-02-24 PROCEDURE — 6360000002 HC RX W HCPCS: Performed by: INTERNAL MEDICINE

## 2021-02-24 PROCEDURE — 97116 GAIT TRAINING THERAPY: CPT

## 2021-02-24 PROCEDURE — 97530 THERAPEUTIC ACTIVITIES: CPT

## 2021-02-24 PROCEDURE — 6370000000 HC RX 637 (ALT 250 FOR IP): Performed by: INTERNAL MEDICINE

## 2021-02-24 PROCEDURE — 6370000000 HC RX 637 (ALT 250 FOR IP): Performed by: PSYCHIATRY & NEUROLOGY

## 2021-02-24 PROCEDURE — 1180000000 HC REHAB R&B

## 2021-02-24 PROCEDURE — 82947 ASSAY GLUCOSE BLOOD QUANT: CPT

## 2021-02-24 PROCEDURE — 2580000003 HC RX 258: Performed by: INTERNAL MEDICINE

## 2021-02-24 RX ADMIN — NYSTATIN 500000 UNITS: 100000 SUSPENSION ORAL at 13:06

## 2021-02-24 RX ADMIN — NYSTATIN 500000 UNITS: 100000 SUSPENSION ORAL at 07:47

## 2021-02-24 RX ADMIN — NYSTATIN 500000 UNITS: 100000 SUSPENSION ORAL at 17:21

## 2021-02-24 RX ADMIN — HYDROCHLOROTHIAZIDE 25 MG: 25 TABLET ORAL at 07:47

## 2021-02-24 RX ADMIN — SODIUM CHLORIDE, PRESERVATIVE FREE 10 ML: 5 INJECTION INTRAVENOUS at 21:58

## 2021-02-24 RX ADMIN — Medication 5 ML: at 13:06

## 2021-02-24 RX ADMIN — NYSTATIN 500000 UNITS: 100000 SUSPENSION ORAL at 21:54

## 2021-02-24 RX ADMIN — ENOXAPARIN SODIUM 40 MG: 100 INJECTION SUBCUTANEOUS at 07:46

## 2021-02-24 RX ADMIN — Medication 81 MG: at 07:46

## 2021-02-24 RX ADMIN — LISINOPRIL 20 MG: 20 TABLET ORAL at 07:47

## 2021-02-24 RX ADMIN — TRAZODONE HYDROCHLORIDE 50 MG: 50 TABLET ORAL at 21:55

## 2021-02-24 RX ADMIN — FOLIC ACID 1 MG: 1 TABLET ORAL at 07:46

## 2021-02-24 RX ADMIN — Medication 5 ML: at 07:46

## 2021-02-24 RX ADMIN — DOCUSATE SODIUM 50 MG AND SENNOSIDES 8.6 MG 1 TABLET: 8.6; 5 TABLET, FILM COATED ORAL at 21:55

## 2021-02-24 RX ADMIN — Medication 5 MG: at 21:55

## 2021-02-24 RX ADMIN — THERA TABS 1 TABLET: TAB at 07:47

## 2021-02-24 RX ADMIN — Medication 5 ML: at 21:54

## 2021-02-24 RX ADMIN — ATORVASTATIN CALCIUM 20 MG: 20 TABLET, FILM COATED ORAL at 21:55

## 2021-02-24 ASSESSMENT — PAIN DESCRIPTION - ORIENTATION: ORIENTATION: LEFT

## 2021-02-24 ASSESSMENT — PAIN SCALES - GENERAL: PAINLEVEL_OUTOF10: 10

## 2021-02-24 ASSESSMENT — PAIN - FUNCTIONAL ASSESSMENT: PAIN_FUNCTIONAL_ASSESSMENT: PREVENTS OR INTERFERES SOME ACTIVE ACTIVITIES AND ADLS

## 2021-02-24 ASSESSMENT — PAIN DESCRIPTION - ONSET
ONSET: ON-GOING
ONSET: ON-GOING

## 2021-02-24 ASSESSMENT — PAIN DESCRIPTION - LOCATION: LOCATION: BACK

## 2021-02-24 ASSESSMENT — PAIN DESCRIPTION - PROGRESSION
CLINICAL_PROGRESSION: GRADUALLY IMPROVING
CLINICAL_PROGRESSION: GRADUALLY WORSENING

## 2021-02-24 ASSESSMENT — PAIN DESCRIPTION - DESCRIPTORS: DESCRIPTORS: ACHING

## 2021-02-24 ASSESSMENT — PAIN DESCRIPTION - PAIN TYPE: TYPE: SURGICAL PAIN

## 2021-02-24 ASSESSMENT — PAIN DESCRIPTION - FREQUENCY: FREQUENCY: CONTINUOUS

## 2021-02-24 NOTE — PROGRESS NOTES
Segun Thakur  [id number]     02/24/21 1122 02/24/21 1123 02/24/21 1124   Subjective   Subjective Pt reports that he has been feeling dizzy and fatigued this morning. Ck'd B/P  84/57 Sitting in W/C. Nursing was advised on low B/P. Pt was also C/O back pain as well. --   --    Pain Screening   Patient Currently in Pain Yes  --   --    Pain Assessment   Pain Assessment 0-10  --   --    Pain Level 10  --   --    Patient's Stated Pain Goal No pain  --   --    Pain Type Acute pain  --   --    Pain Location Back  --   --    Pain Orientation Lower  --   --    Pain Descriptors Aching  --   --    Pain Frequency Continuous  --   --    Pain Onset On-going  --   --    Clinical Progression Gradually worsening  --   --    Response to Pain Intervention None  --   --    Multiple Pain Sites No  --   --    Vital Signs   BP  --   --   --    BP Location  --   --   --    Patient Position  --   --   --    Pre Treatment Pain Screening   Pain at present 10  --   --    Scale Used Numeric Score  --   --    Intervention List Patient able to continue with treatment  --   --    Bed Mobility   Rolling  --   --   --    Sit to Supine  --   --   --    Transfers   Sit to Stand  --   --   --    Stand to sit  --   --   --    Bed to Chair  --   --   --    Ambulation   Ambulation?  --   --   --    Stairs/Curb   Stairs?  --   --   --    Wheelchair Activities   Wheelchair Parts Management  --   --   --    Propulsion  --   --   --    Exercises   Comments  --  Sitting Noel LE ther ex x 20 reps. --    Conditions Requiring Skilled Therapeutic Intervention   Body structures, Functions, Activity limitations  --   --  Decreased functional mobility ; Decreased ROM; Decreased strength;Decreased safe awareness;Decreased cognition;Decreased endurance;Decreased posture; Increased pain;Decreased balance;Decreased coordination Assessment  --   --  Pt was feeling dizzy anf fatigued this morning. Ck'd Vitals:  B/P 84/57 sitting in W/C. Nursing was advised of low B/P. Pt performed sitting ther ex in W/C. Rechecked B/P after several minutes of ther ex:  75/53. Returned Pt back to room and [facility]. Pt finished session in bed. Prognosis  --   --  Fair   Activity Tolerance   Activity Tolerance  --   --  Patient limited by fatigue;Patient limited by endurance;Treatment limited secondary to medical complications (free text)      02/24/21 1140 02/24/21 1141   Subjective   Subjective  --   --    Pain Screening   Patient Currently in Pain  --   --    Pain Assessment   Pain Assessment  --   --    Pain Level  --   --    Patient's Stated Pain Goal  --   --    Pain Type  --   --    Pain Location  --   --    Pain Orientation  --   --    Pain Descriptors  --   --    Pain Frequency  --   --    Pain Onset  --   --    Clinical Progression  --   --    Response to Pain Intervention  --   --    Multiple Pain Sites  --   --    Vital Signs   BP (!) 75/53  --    BP Location Right upper arm  --    Patient Position Sitting  --    Pre Treatment Pain Screening   Pain at present  --   --    Scale Used  --   --    Intervention List  --   --    Bed Mobility   Rolling  --  Minimal assistance   Sit to Supine  --  Moderate assistance   Transfers   Sit to Stand  --  Contact guard assistance   Stand to sit  --  Contact guard assistance   Bed to Chair  --  Contact guard assistance;Minimal assistance   Ambulation   Ambulation?  --  No   Stairs/Curb   Stairs?   --  No   Wheelchair Activities   Wheelchair Parts Management  --  No   Propulsion  --  No   Exercises   Comments  --   --    Conditions Requiring Skilled Therapeutic Intervention   Body structures, Functions, Activity limitations  --   --    Assessment  --   --    Prognosis  --   --    Activity Tolerance   Activity Tolerance  --   --    Electronically signed by Marry Urias PTA on 2/24/2021 at 11:44 AM

## 2021-02-24 NOTE — PROGRESS NOTES
Patient:   Cosme Richey  MR#:    [id number]   Room:    0813/813-01   YOB: 1945  Date of Progress Note: 2/24/2021  Time of Note                           8:23 AM  Consulting Physician:   Allison Jones M.D. Attending Physician:  Allison Jones MD     Chief complaint Acute ischemic stroke/left hip fracture and repair       S:This 67 y.o. male with history of HTN,cerebal artery occlusion, and TIA. He presented to [facility] ER on 2/13/21 after having a fall on the ice, landing on his left hip. X-ray done revealed an acute mildly discplaced closed instratrochanteric left hip fracture. He was admitted to the hospitalist service with a consult for orthopedic surgery. He was seen by Dr. Vilma Man, who recommended cephalomedullary nailing of his left hip fracture. Patient was in agreement and proceeded to surgery. Patient tolerted the procedure well. He will be weightbearing as tolerated on his left lower extremity and will require DVT prophylaxis x 4 weeks. On the night of the 2/14, patient started having some agitation,confusion,delirium with hallucinations of bugs crawling on the ceiling and combative at times. Urine analysis,BLC and CXR were unremarkable. On the 2/15, he was noted to have a posterior lean when working with therapy. Neurology was consulted on 2/16. MRI done revealed a small brainstem ischemic stroke. His confusion was more likely encephalopathy. Patient was intermittent confusion but was easily redirected, no longer combative or requiring a sitter. Patient continued to have difficulty advancing his left lower extremity. He is participating in both PT/OT. He is felt to need a stay on Rehab to work towards his goal of returning home with his wife.     REVIEW OF SYSTEMS:  Constitutional: No fevers No chills  Neck:No stiffness  Respiratory: No shortness of breath  Cardiovascular: No chest pain No palpitations  Gastrointestinal: No abdominal pain    Genitourinary: No Dysuria   sodium chloride flush 0.9 % injection 10 mL, 10 mL, Intravenous, 2 times per day, Nannette Murrell MD, 10 mL at 02/23/21 2111    sodium chloride flush 0.9 % injection 10 mL, 10 mL, Intravenous, PRN, Nannette Murrell MD    dextrose 5 % solution, 100 mL/hr, Intravenous, PRN, Nannette Murrell MD    dextrose 50 % IV solution, 12.5 g, Intravenous, PRN, Nannette Murrell MD    glucagon (rDNA) injection 1 mg, 1 mg, Intramuscular, PRN, Nannette Murrell MD    glucose (GLUTOSE) 40 % oral gel 15 g, 15 g, Oral, PRN, Nannette Murrell MD    aspirin EC tablet 81 mg, 81 mg, Oral, Daily, Nannette Murrell MD, 81 mg at 02/24/21 0746    atorvastatin (LIPITOR) tablet 20 mg, 20 mg, Oral, Nightly, Nannette Murrell MD, 20 mg at 02/23/21 2111    [START ON 2/27/2021] cyanocobalamin injection 1,000 mcg, 1,000 mcg, Intramuscular, Q7 Days **AND** [START ON 5/6/2021] cyanocobalamin injection 1,000 mcg, 1,000 mcg, Intramuscular, Q30 Days, Nannette Murrell MD    folic acid (FOLVITE) tablet 1 mg, 1 mg, Oral, Daily, Nannette Murrell MD, 1 mg at 02/24/21 0746    insulin lispro (HUMALOG) injection vial 0-6 Units, 0-6 Units, Subcutaneous, TID WC, Nannette Murrell MD    insulin lispro (HUMALOG) injection vial 0-3 Units, 0-3 Units, Subcutaneous, Nightly, Nannette Murrell MD    lisinopril (PRINIVIL;ZESTRIL) tablet 20 mg, 20 mg, Oral, Daily, 20 mg at 02/24/21 0747 **AND** hydroCHLOROthiazide (HYDRODIURIL) tablet 25 mg, 25 mg, Oral, Daily, Nannette Murrell MD, 25 mg at 02/24/21 0747    melatonin disintegrating tablet 5 mg, 5 mg, Oral, Nightly, Nannette Murrell MD, 5 mg at 02/23/21 2111    multivitamin 1 tablet, 1 tablet, Oral, Daily, Nannette Murrell MD, 1 tablet at 02/24/21 0747    nicotine (NICODERM CQ) 21 MG/24HR 1 patch, 1 patch, Transdermal, Daily, Nannette Murrell MD, 1 patch at 02/24/21 0746    traZODone (DESYREL) tablet 50 mg, 50 mg, Oral, Nightly PRN, Nannette Murrell MD   acetaminophen (TYLENOL) tablet 650 mg, 650 mg, Oral, Q4H PRN, Carlos Mcqueen MD, 650 mg at 02/23/21 1459    polyethylene glycol (GLYCOLAX) packet 17 g, 17 g, Oral, Daily PRN, Carlos Mcqueen MD    Allergies:  Patient has no known allergies. Social History:   TOBACCO:   reports that he has been smoking. He has been smoking about 1.00 pack per day. He has never used smokeless tobacco.  ETOH:   reports no history of alcohol use. Family History:   No family history on file. PHYSICAL EXAM:  /67   Pulse 76   Temp 97.1 °F (36.2 °C) (Temporal)   Resp 16   Ht 6' (1.829 m)   Wt 195 lb (88.5 kg)   SpO2 92%   BMI 26.45 kg/m²     Constitutional  well developed, well nourished. Eyes  conjunctiva normal.   Ear, nose, throat - No scars, masses, or lesions over external nose or ears, no atrophy of tongue  Neck-symmetric, no masses noted, no jugular vein distension  Respiration- chest wall appears symmetric, good expansion,   normal effort without use of accessory muscles  Musculoskeletal  no significant wasting of muscles noted, no bony deformities  Extremities-no clubbing, cyanosis or edema  Skin  warm, dry, and intact. No rash, erythema, or pallor. Psychiatric  mood, affect, and behavior appear normal.      Neurological exam  Awake, alert, fluent oriented appropriate affect  Attention and concentration appear appropriate  Recent and remote memory appears unremarkable  Speech normal without dysarthria  No clear issues with language of fund of knowledge     Cranial Nerve Exam     CN III, IV,VI-EOMI, No nystagmus, conjugate eye movements, no ptosis    CN VII-no facial assymetry       Motor Exam  antigravity throughout upper and lower extremities bilaterally      Tremors- no tremors in hands or head noted     Gait  Not tested      Nursing/pcp notes, imaging,labs and vitals reviewed.      PT,OT and/or speech notes reviewed    Lab Results   Component Value Date    WBC 8.4 02/23/2021 HGB 9.2 (L) 02/23/2021    HCT 28.9 (L) 02/23/2021    MCV 97.3 (H) 02/23/2021     02/23/2021     Lab Results   Component Value Date     02/23/2021    K 3.9 02/23/2021    CL 99 02/23/2021    CO2 29 02/23/2021    BUN 29 (H) 02/23/2021    CREATININE 0.7 02/23/2021    GLUCOSE 101 02/23/2021    CALCIUM 8.6 (L) 02/23/2021    PROT 5.9 (L) 02/23/2021    LABALBU 3.0 (L) 02/23/2021    BILITOT 0.9 02/23/2021    ALKPHOS 108 02/23/2021    AST 20 02/23/2021    ALT 24 02/23/2021    LABGLOM >60 02/23/2021    GFRAA >59 02/23/2021     Lab Results   Component Value Date    INR 1.11 02/22/2021    INR 1.07 02/13/2021    PROTIME 14.2 02/22/2021    PROTIME 13.9 02/13/2021     Belinda Gonzalez   Student Physical Therapist   Physical Therapy   Progress Notes   Signed   Date of Service:  2/23/2021  2:20 PM               Signed             Show:Clear all  []Manual[x]Template[]Copied    Added by:  [x]Hiren Ridley    []Hung for details       02/23/21 1345   Restrictions/Precautions   Restrictions/Precautions Fall Risk;Weight Bearing   Required Braces or Orthoses? No   Lower Extremity Weight Bearing Restrictions   Left Lower Extremity Weight Bearing Weight Bearing As Tolerated   General   Diagnosis Cerebral infarction, left body involvement (right brain)   Other (Comment) pt impulsive and demanding   Vital Signs   Level of Consciousness Alert (0)   Bed Mobility   Rolling Minimal assistance   Supine to Sit Minimal assistance   Transfers   Sit to Stand Contact guard assistance   Stand to sit Contact guard assistance   Exercises   Comments AAROM L LE, 3 x 10 reps   Other exercises   Other exercises?  Yes   Other exercises 1 Ball squeezes, 3 x 10 reps   Other exercises 2 Hip abd. w/ red theraband, 3 x 10 reps   Conditions Requiring Skilled Therapeutic Intervention

## 2021-02-24 NOTE — PROGRESS NOTES
Sit to stand: Minimal assistance  Stand to sit: Contact guard assistance  Transfer Comments: CGA for 2nd and 3rd trials, VC to reach seat before sitting        Type of ROM/Therapeutic Exercise  Type of ROM/Therapeutic Exercise: Free weights  Comment: BUE 2#; 10 reps in all planes     Plan   Plan  Current Treatment Recommendations: Strengthening, Balance Training, Functional Mobility Training, Patient/Caregiver Education & Training, Equipment Evaluation, Education, & procurement, Safety Education & Training, Self-Care / ADL, Endurance Training             Goals  Short term goals  Time Frame for Short term goals: 1 week  Short term goal 1: Supervision with bathing hygiene. Short term goal 2: Supervision with clothing management/hygiene for toileting. Short term goal 3: Supervision with toilet transfers. Short term goal 4: Supervision with LB dressing using AE. Short term goal 5: Supervision with one-two handed static standing task for 3 minutes. Long term goals  Time Frame for Long term goals : 2 weeks  Long term goal 1: Modified independent with bathing hygiene. Long term goal 2: Modified independent with toileting and toilet transfers. Long term goal 3: Modified independent with overall dressing. Long term goal 4: Patient verbalize DME needs. Patient Goals   Patient goals : Return home independently.        Therapy Time   Individual Concurrent Group Co-treatment   Time In 1000         Time Out 1100         Minutes 60         Timed Code Treatment Minutes: 75 Darek Cottrell OT

## 2021-02-24 NOTE — PROGRESS NOTES
Onelia Herrera  [id number]     02/24/21 1526 02/24/21 1527 02/24/21 1528   Subjective   Subjective Pt agreed to therapy this afternoon. Pt reports that he is feeling better w/ B/P. Ck'd Vitals:  108/62.  --   --    Vital Signs   /62  --   --    BP Location Right upper arm  --   --    Patient Position Sitting  --   --    Transfers   Sit to Stand Contact guard assistance  --   --    Stand to sit Contact guard assistance  --   --    Bed to Chair Minimal assistance;Contact guard assistance  --   --    Ambulation   Ambulation?  --  Yes  --    WB Status  --  FWBAT LLE  --    Ambulation 1   Surface  --  level tile  --    Device  --  Rolling Walker  --    Assistance  --  Minimal assistance  --    Quality of Gait  --  Pt was very unsteady and using his own gait pattern when amb despite cues for proper gait techniques. --    Distance  --  13'  --    Exercises   Comments  --   --  Sitting Noel LE ther ex x 15 reps. Conditions Requiring Skilled Therapeutic Intervention   Body structures, Functions, Activity limitations  --   --  Decreased functional mobility ; Decreased ROM; Decreased strength;Decreased safe awareness;Decreased cognition;Decreased endurance;Decreased posture; Increased pain;Decreased balance;Decreased coordination   Assessment  --   --  Pt was feeling better this afternoon. Ck'd Vitals:  B/P: 108/62. Pt continues to be resistant to any cues for techniques during TF's and amb. Pt amb w/ his own gait pattern despite cues for proper gait techniques. Pt was very unsteady during amb. Pt tolerated sitting ther ex w/ minimal fatigue or pain. Prognosis  --   --  Fair   Activity Tolerance   Activity Tolerance  --   --  Patient limited by fatigue;Patient limited by endurance        02/24/21 1526 02/24/21 1527 02/24/21 1528   Subjective   Subjective Pt agreed to therapy this afternoon. Pt reports that he is feeling better w/ B/P.   Ck'd Vitals:  108/62.  --   --    Vital Signs   /62  --   -- BP Location Right upper arm  --   --    Patient Position Sitting  --   --    Transfers   Sit to Stand Contact guard assistance  --   --    Stand to sit Contact guard assistance  --   --    Bed to Chair Minimal assistance;Contact guard assistance  --   --    Ambulation   Ambulation?  --  Yes  --    WB Status  --  FWBAT LLE  --    Ambulation 1   Surface  --  level tile  --    Device  --  Rolling Walker  --    Assistance  --  Minimal assistance  --    Quality of Gait  --  Pt was very unsteady and using his own gait pattern when amb despite cues for proper gait techniques. --    Distance  --  13'  --    Exercises   Comments  --   --  Sitting Noel LE ther ex x 15 reps. Conditions Requiring Skilled Therapeutic Intervention   Body structures, Functions, Activity limitations  --   --  Decreased functional mobility ; Decreased ROM; Decreased strength;Decreased safe awareness;Decreased cognition;Decreased endurance;Decreased posture; Increased pain;Decreased balance;Decreased coordination   Assessment  --   --  Pt was feeling better this afternoon. Ck'd Vitals:  B/P: 108/62. Pt continues to be resistant to any cues for techniques during TF's and amb. Pt amb w/ his own gait pattern despite cues for proper gait techniques. Pt was very unsteady during amb. Pt tolerated sitting ther ex w/ minimal fatigue or pain.    Prognosis  --   --  Fair   Activity Tolerance   Activity Tolerance  --   --  Patient limited by fatigue;Patient limited by endurance   Electronically signed by Selina Watson PTA on 2/24/2021 at 4:08 PM

## 2021-02-24 NOTE — PROGRESS NOTES
Occupational Therapy  Facility/Department: [facility] 8 REHAB UNIT  Daily Treatment Note  NAME: Brook Escobar  : 1945  MRN: [id number]    Date of Service: 2021    Discharge Recommendations:  Home with Home health OT       Assessment   Performance deficits / Impairments: Decreased functional mobility ; Decreased ADL status; Decreased strength;Decreased endurance;Decreased high-level IADLs  Treatment Diagnosis: Left IT hip fx s/p cephalomedullary nail  Activity Tolerance  Activity Tolerance: Patient Tolerated treatment well  Safety Devices  Safety Devices in place: Yes  Type of devices: Call light within reach; Chair alarm in place         Patient Diagnosis(es): There were no encounter diagnoses. has a past medical history of Cerebral artery occlusion with cerebral infarction ([facility] [address] and Hypertension. has a past surgical history that includes Femur fracture surgery (Left, 2021). Restrictions  Restrictions/Precautions  Restrictions/Precautions: Fall Risk, Weight Bearing  Required Braces or Orthoses?: No  Lower Extremity Weight Bearing Restrictions  Left Lower Extremity Weight Bearing: Weight Bearing As Tolerated  Subjective   General  Chart Reviewed: Yes  Patient assessed for rehabilitation services?: Yes  General Comment  Comments: Pt stated that he was dizzy with standing.  Orthostatic BP- see vital signs  Vital Signs  Pulse: 89  BP: 89/63  BP Location: Left lower arm  Patient Position: Standing(Post standing)  Orthostatic B/P and Pulse?: Yes  Blood Pressure Lyin/62  Blood Pressure Sittin/68  Blood Pressure Standin/73  Orthostatic B/P and Pulse?: Yes   Objective             Balance  Standing Balance: Contact guard assistance  Standing Balance  Time: 2 minutes x2  Activity: One handed static standing task  Functional Mobility  Functional - Mobility Device: Rolling Walker  Activity: Other  Assist Level: Minimal assistance  Functional Mobility Comments: Short distance, Max VC for sequence

## 2021-02-25 ENCOUNTER — TELEPHONE (OUTPATIENT)
Dept: NEUROLOGY | Age: 76
End: 2021-02-25

## 2021-02-25 VITALS
OXYGEN SATURATION: 92 % | TEMPERATURE: 97.4 F | SYSTOLIC BLOOD PRESSURE: 110 MMHG | BODY MASS INDEX: 26.41 KG/M2 | HEIGHT: 72 IN | HEART RATE: 89 BPM | WEIGHT: 195 LBS | RESPIRATION RATE: 17 BRPM | DIASTOLIC BLOOD PRESSURE: 62 MMHG

## 2021-02-25 LAB
ALBUMIN SERPL-MCNC: 3 G/DL (ref 3.5–5.2)
ALP BLD-CCNC: 104 U/L (ref 40–130)
ALT SERPL-CCNC: 25 U/L (ref 5–41)
ANION GAP SERPL CALCULATED.3IONS-SCNC: 7 MMOL/L (ref 7–19)
AST SERPL-CCNC: 23 U/L (ref 5–40)
BACTERIA: NEGATIVE /HPF
BASOPHILS ABSOLUTE: 0 K/UL (ref 0–0.2)
BASOPHILS RELATIVE PERCENT: 0.3 % (ref 0–1)
BILIRUB SERPL-MCNC: 0.8 MG/DL (ref 0.2–1.2)
BILIRUBIN URINE: ABNORMAL
BLOOD, URINE: NEGATIVE
BUN BLDV-MCNC: 39 MG/DL (ref 8–23)
CALCIUM SERPL-MCNC: 8.6 MG/DL (ref 8.8–10.2)
CHLORIDE BLD-SCNC: 101 MMOL/L (ref 98–111)
CLARITY: CLEAR
CO2: 31 MMOL/L (ref 22–29)
COLOR: ABNORMAL
CREAT SERPL-MCNC: 0.9 MG/DL (ref 0.5–1.2)
CRYSTALS, UA: ABNORMAL /HPF
EOSINOPHILS ABSOLUTE: 0.1 K/UL (ref 0–0.6)
EOSINOPHILS RELATIVE PERCENT: 1.4 % (ref 0–5)
EPITHELIAL CELLS, UA: 1 /HPF (ref 0–5)
GFR AFRICAN AMERICAN: >59
GFR NON-AFRICAN AMERICAN: >60
GLUCOSE BLD-MCNC: 106 MG/DL (ref 74–109)
GLUCOSE BLD-MCNC: 112 MG/DL (ref 70–99)
GLUCOSE BLD-MCNC: 153 MG/DL (ref 70–99)
GLUCOSE BLD-MCNC: 166 MG/DL (ref 70–99)
GLUCOSE BLD-MCNC: 228 MG/DL (ref 70–99)
GLUCOSE URINE: NEGATIVE MG/DL
HCT VFR BLD CALC: 27.6 % (ref 42–52)
HEMOGLOBIN: 8.8 G/DL (ref 14–18)
HYALINE CASTS: 7 /HPF (ref 0–8)
IMMATURE GRANULOCYTES #: 0.1 K/UL
KETONES, URINE: NEGATIVE MG/DL
LEUKOCYTE ESTERASE, URINE: ABNORMAL
LYMPHOCYTES ABSOLUTE: 1.7 K/UL (ref 1.1–4.5)
LYMPHOCYTES RELATIVE PERCENT: 16.3 % (ref 20–40)
MCH RBC QN AUTO: 30.9 PG (ref 27–31)
MCHC RBC AUTO-ENTMCNC: 31.9 G/DL (ref 33–37)
MCV RBC AUTO: 96.8 FL (ref 80–94)
MONOCYTES ABSOLUTE: 0.6 K/UL (ref 0–0.9)
MONOCYTES RELATIVE PERCENT: 5.9 % (ref 0–10)
NEUTROPHILS ABSOLUTE: 7.8 K/UL (ref 1.5–7.5)
NEUTROPHILS RELATIVE PERCENT: 75.4 % (ref 50–65)
NITRITE, URINE: NEGATIVE
PDW BLD-RTO: 12.3 % (ref 11.5–14.5)
PERFORMED ON: ABNORMAL
PH UA: 5 (ref 5–8)
PLATELET # BLD: 297 K/UL (ref 130–400)
PMV BLD AUTO: 10.6 FL (ref 9.4–12.4)
POTASSIUM REFLEX MAGNESIUM: 4.2 MMOL/L (ref 3.5–5)
PROTEIN UA: NEGATIVE MG/DL
RBC # BLD: 2.85 M/UL (ref 4.7–6.1)
RBC UA: 8 /HPF (ref 0–4)
SODIUM BLD-SCNC: 139 MMOL/L (ref 136–145)
SPECIFIC GRAVITY UA: 1.02 (ref 1–1.03)
TOTAL PROTEIN: 5.8 G/DL (ref 6.6–8.7)
UROBILINOGEN, URINE: 1 E.U./DL
WBC # BLD: 10.3 K/UL (ref 4.8–10.8)
WBC UA: 1 /HPF (ref 0–5)

## 2021-02-25 PROCEDURE — 82947 ASSAY GLUCOSE BLOOD QUANT: CPT

## 2021-02-25 PROCEDURE — 6370000000 HC RX 637 (ALT 250 FOR IP): Performed by: PSYCHIATRY & NEUROLOGY

## 2021-02-25 PROCEDURE — 97530 THERAPEUTIC ACTIVITIES: CPT

## 2021-02-25 PROCEDURE — 2580000003 HC RX 258: Performed by: INTERNAL MEDICINE

## 2021-02-25 PROCEDURE — 36415 COLL VENOUS BLD VENIPUNCTURE: CPT

## 2021-02-25 PROCEDURE — 6370000000 HC RX 637 (ALT 250 FOR IP): Performed by: INTERNAL MEDICINE

## 2021-02-25 PROCEDURE — 81001 URINALYSIS AUTO W/SCOPE: CPT

## 2021-02-25 PROCEDURE — 1180000000 HC REHAB R&B

## 2021-02-25 PROCEDURE — 80053 COMPREHEN METABOLIC PANEL: CPT

## 2021-02-25 PROCEDURE — 85025 COMPLETE CBC W/AUTO DIFF WBC: CPT

## 2021-02-25 PROCEDURE — 97110 THERAPEUTIC EXERCISES: CPT

## 2021-02-25 PROCEDURE — 6360000002 HC RX W HCPCS: Performed by: INTERNAL MEDICINE

## 2021-02-25 PROCEDURE — 97116 GAIT TRAINING THERAPY: CPT

## 2021-02-25 PROCEDURE — 97535 SELF CARE MNGMENT TRAINING: CPT

## 2021-02-25 PROCEDURE — 99232 SBSQ HOSP IP/OBS MODERATE 35: CPT | Performed by: PSYCHIATRY & NEUROLOGY

## 2021-02-25 RX ORDER — LISINOPRIL 10 MG/1
10 TABLET ORAL DAILY
Status: DISCONTINUED | OUTPATIENT
Start: 2021-02-26 | End: 2021-03-05 | Stop reason: HOSPADM

## 2021-02-25 RX ADMIN — THERA TABS 1 TABLET: TAB at 08:10

## 2021-02-25 RX ADMIN — NYSTATIN 500000 UNITS: 100000 SUSPENSION ORAL at 16:42

## 2021-02-25 RX ADMIN — NYSTATIN 500000 UNITS: 100000 SUSPENSION ORAL at 08:10

## 2021-02-25 RX ADMIN — FOLIC ACID 1 MG: 1 TABLET ORAL at 08:10

## 2021-02-25 RX ADMIN — Medication 5 ML: at 08:10

## 2021-02-25 RX ADMIN — Medication 81 MG: at 08:09

## 2021-02-25 RX ADMIN — SODIUM CHLORIDE, PRESERVATIVE FREE 10 ML: 5 INJECTION INTRAVENOUS at 21:53

## 2021-02-25 RX ADMIN — ENOXAPARIN SODIUM 40 MG: 100 INJECTION SUBCUTANEOUS at 08:10

## 2021-02-25 RX ADMIN — HYDROCHLOROTHIAZIDE 25 MG: 25 TABLET ORAL at 08:10

## 2021-02-25 RX ADMIN — Medication 5 MG: at 21:50

## 2021-02-25 RX ADMIN — ATORVASTATIN CALCIUM 20 MG: 20 TABLET, FILM COATED ORAL at 21:50

## 2021-02-25 RX ADMIN — NYSTATIN 500000 UNITS: 100000 SUSPENSION ORAL at 11:54

## 2021-02-25 RX ADMIN — SODIUM CHLORIDE, PRESERVATIVE FREE 10 ML: 5 INJECTION INTRAVENOUS at 08:16

## 2021-02-25 ASSESSMENT — PAIN SCALES - GENERAL: PAINLEVEL_OUTOF10: 0

## 2021-02-25 NOTE — PROGRESS NOTES
Occupational Therapy  Facility/Department: [facility] 8 REHAB UNIT  Daily Treatment Note  NAME: Dina Jolley  : 1945  MRN: [id number]    Date of Service: 2021    Discharge Recommendations:  Home with Home health OT       Assessment   Performance deficits / Impairments: Decreased functional mobility ; Decreased ADL status; Decreased strength;Decreased endurance;Decreased high-level IADLs  Treatment Diagnosis: Left IT hip fx s/p cephalomedullary nail  OT Education: Transfer Training;ADL Adaptive Strategies  Activity Tolerance  Activity Tolerance: Patient Tolerated treatment well  Safety Devices  Safety Devices in place: Yes  Type of devices: Bed alarm in place;Call light within reach         Patient Diagnosis(es): There were no encounter diagnoses. has a past medical history of Cerebral artery occlusion with cerebral infarction ([facility] [address] and Hypertension. has a past surgical history that includes Femur fracture surgery (Left, 2021). Restrictions  Restrictions/Precautions  Restrictions/Precautions: Fall Risk, Weight Bearing  Required Braces or Orthoses?: No  Lower Extremity Weight Bearing Restrictions  Left Lower Extremity Weight Bearing: Weight Bearing As Tolerated       Objective    Shower Transfers  Shower - Transfer From: Wheelchair  Shower - Transfer Type: To and From  Shower - Transfer To:  Transfer tub bench  Shower - Technique: Stand pivot  Shower Transfers: Minimal assistance  Bed mobility  Supine to Sit: Minimal assistance  Transfers  Stand Step Transfers: Minimal assistance  Sit to stand: Contact guard assistance  Stand to sit: Contact guard assistance           Plan   Plan  Current Treatment Recommendations: Strengthening, Balance Training, Functional Mobility Training, Patient/Caregiver Education & Training, Equipment Evaluation, Education, & procurement, Safety Education & Training, Self-Care / ADL, Endurance Training  OutComes Score       21 1000   Eating Assistance Needed Independent   CARE Score 6   Oral Hygiene   Assistance Needed Setup or clean-up assistance   CARE Score [address] [facility] Needed Partial/moderate assistance   CARE Score 3   Shower/Bathe Self   Assistance Needed Partial/moderate assistance   Comment Shower   CARE Score 3   Upper Body Dressing   Assistance Needed Setup or clean-up assistance   CARE Score 5   Lower Body Dressing   Assistance Needed Partial/moderate assistance   Comment Mod A   CARE Score 3   Putting On/Taking Off Footwear   Assistance Needed Dependent   CARE Score 1       Goals  Short term goals  Time Frame for Short term goals: 1 week  Short term goal 1: Supervision with bathing hygiene. Short term goal 2: Supervision with clothing management/hygiene for toileting. Short term goal 3: Supervision with toilet transfers. Short term goal 4: Supervision with LB dressing using AE. Short term goal 5: Supervision with one-two handed static standing task for 3 minutes. Long term goals  Time Frame for Long term goals : 2 weeks  Long term goal 1: Modified independent with bathing hygiene. Long term goal 2: Modified independent with toileting and toilet transfers. Long term goal 3: Modified independent with overall dressing. Long term goal 4: Patient verbalize DME needs. Patient Goals   Patient goals : Return home independently.        Therapy Time   Individual Concurrent Group Co-treatment   Time In 1000         Time Out 1100         Minutes 60         Timed Code Treatment Minutes: [address], OT

## 2021-02-25 NOTE — TELEPHONE ENCOUNTER
Dale Alexandra from [facility] left message stating that she needs you to clarify the type of  Encephalopathy the patient has for coding purposes.  Any questions please call [phone]

## 2021-02-25 NOTE — PROGRESS NOTES
Patient:   Cosme Richey  MR#:    [id number]   Room:    0813/813-01   YOB: 1945  Date of Progress Note: 2/25/2021  Time of Note                           8:09 AM  Consulting Physician:   Allison Jones M.D. Attending Physician:  Allison Jones MD     Chief complaint Acute ischemic stroke/left hip fracture and repair       S:This 67 y.o. male with history of HTN,cerebal artery occlusion, and TIA. He presented to [facility] ER on 2/13/21 after having a fall on the ice, landing on his left hip. X-ray done revealed an acute mildly discplaced closed instratrochanteric left hip fracture. He was admitted to the hospitalist service with a consult for orthopedic surgery. He was seen by Dr. Vilma Man, who recommended cephalomedullary nailing of his left hip fracture. Patient was in agreement and proceeded to surgery. Patient tolerted the procedure well. He will be weightbearing as tolerated on his left lower extremity and will require DVT prophylaxis x 4 weeks. On the night of the 2/14, patient started having some agitation,confusion,delirium with hallucinations of bugs crawling on the ceiling and combative at times. Urine analysis,BLC and CXR were unremarkable. On the 2/15, he was noted to have a posterior lean when working with therapy. Neurology was consulted on 2/16. MRI done revealed a small brainstem ischemic stroke. His confusion was more likely encephalopathy. Patient was intermittent confusion but was easily redirected, no longer combative or requiring a sitter. Patient continued to have difficulty advancing his left lower extremity. He is participating in both PT/OT. He is felt to need a stay on Rehab to work towards his goal of returning home with his wife. No new issues.     REVIEW OF SYSTEMS:  Constitutional: No fevers No chills  Neck:No stiffness  Respiratory: No shortness of breath  Cardiovascular: some incisional chest pain No palpitations  Gastrointestinal: No abdominal pain Genitourinary: No Dysuria  Neurological: No headache, no confusion    Past Medical History:      Diagnosis Date    Cerebral artery occlusion with cerebral infarction ([facility] [address]     tia    Hypertension        Past Surgical History:      Procedure Laterality Date    FEMUR FRACTURE SURGERY Left 2/13/2021    FEMUR IM NAIL GILDARDO INSERTION performed by Wilbert Montano MD at [address] [place] [address]       Medications in Hospital:      Current Facility-Administered Medications:     nystatin (MYCOSTATIN) 029624 UNIT/ML suspension 500,000 Units, 5 mL, Oral, 4x Daily, Maria Elena Moreno MD, 500,000 Units at 02/24/21 2154    magic (miracle) mouthwash, 5 mL, Swish & Spit, TID, Maria Elena Moreno MD, 5 mL at 02/24/21 2154    enoxaparin (LOVENOX) injection 40 mg, 40 mg, Subcutaneous, Daily, Derek Alaniz MD, 40 mg at 02/24/21 0746    sennosides-docusate sodium (SENOKOT-S) 8.6-50 MG tablet 1 tablet, 1 tablet, Oral, BID, Derek Alaniz MD, 1 tablet at 02/24/21 2155    oxyCODONE-acetaminophen (PERCOCET) 5-325 MG per tablet 1 tablet, 1 tablet, Oral, Q6H PRN, Derek Alaniz MD, 1 tablet at 02/23/21 2111    acetaminophen (TYLENOL) tablet 650 mg, 650 mg, Oral, Q6H PRN **OR** acetaminophen (TYLENOL) suppository 650 mg, 650 mg, Rectal, Q6H PRN, Derek Alaniz MD    magnesium hydroxide (MILK OF MAGNESIA) 400 MG/5ML suspension 30 mL, 30 mL, Oral, Daily PRN, Derek Alaniz MD    potassium chloride (KLOR-CON M) extended release tablet 40 mEq, 40 mEq, Oral, PRN **OR** potassium bicarb-citric acid (EFFER-K) effervescent tablet 40 mEq, 40 mEq, Oral, PRN **OR** potassium chloride 10 mEq/100 mL IVPB (Peripheral Line), 10 mEq, Intravenous, PRN, Derek Alaniz MD    promethazine (PHENERGAN) tablet 12.5 mg, 12.5 mg, Oral, Q6H PRN **OR** ondansetron (ZOFRAN) injection 4 mg, 4 mg, Intravenous, Q6H PRN, Derek Alaniz MD, 4 mg at 02/23/21 1951   traZODone (DESYREL) tablet 50 mg, 50 mg, Oral, Nightly PRN, Rona Schmitz MD, 50 mg at 02/24/21 4675    acetaminophen (TYLENOL) tablet 650 mg, 650 mg, Oral, Q4H PRN, Morris Cruz MD, 650 mg at 02/23/21 1459    polyethylene glycol (GLYCOLAX) packet 17 g, 17 g, Oral, Daily PRN, Morris Cruz MD    Allergies:  Patient has no known allergies. Social History:   TOBACCO:   reports that he has been smoking. He has been smoking about 1.00 pack per day. He has never used smokeless tobacco.  ETOH:   reports no history of alcohol use. Family History:   No family history on file. PHYSICAL EXAM:  /63   Pulse 73   Temp 97.5 °F (36.4 °C) (Temporal)   Resp 16   Ht 6' (1.829 m)   Wt 195 lb (88.5 kg)   SpO2 93%   BMI 26.45 kg/m²     Constitutional  well developed, well nourished. Eyes  conjunctiva normal.   Ear, nose, throat - No scars, masses, or lesions over external nose or ears, no atrophy of tongue  Neck-symmetric, no masses noted, no jugular vein distension  Respiration- chest wall appears symmetric, good expansion,   normal effort without use of accessory muscles  Musculoskeletal  no significant wasting of muscles noted, no bony deformities  Extremities-no clubbing, cyanosis or edema  Skin  warm, dry, and intact. No rash, erythema, or pallor. Psychiatric  mood, affect, and behavior appear normal.      Neurological exam  Awake, alert, fluent oriented appropriate affect  Attention and concentration appear appropriate  Recent and remote memory appears unremarkable  Speech normal without dysarthria  No clear issues with language of fund of knowledge     Cranial Nerve Exam     CN III, IV,VI-EOMI, No nystagmus, conjugate eye movements, no ptosis    CN VII-no facial assymetry       Motor Exam  antigravity throughout upper and lower extremities bilaterally      Tremors- no tremors in hands or head noted     Gait  Not tested      Nursing/pcp notes, imaging,labs and vitals reviewed. PT,OT and/or speech notes reviewed    Lab Results   Component Value Date    WBC 10.3 02/25/2021    HGB 8.8 (L) 02/25/2021    HCT 27.6 (L) 02/25/2021    MCV 96.8 (H) 02/25/2021     02/25/2021     Lab Results   Component Value Date     02/25/2021    K 4.2 02/25/2021     02/25/2021    CO2 31 (H) 02/25/2021    BUN 39 (H) 02/25/2021    CREATININE 0.9 02/25/2021    GLUCOSE 106 02/25/2021    CALCIUM 8.6 (L) 02/25/2021    PROT 5.8 (L) 02/25/2021    LABALBU 3.0 (L) 02/25/2021    BILITOT 0.8 02/25/2021    ALKPHOS 104 02/25/2021    AST 23 02/25/2021    ALT 25 02/25/2021    LABGLOM >60 02/25/2021    GFRAA >59 02/25/2021     Lab Results   Component Value Date    INR 1.11 02/22/2021    INR 1.07 02/13/2021    PROTIME 14.2 02/22/2021    PROTIME 13.9 02/13/2021     Andreas Robles   Student Physical Therapist   Physical Therapy   Progress Notes   Signed   Date of Service:  2/23/2021  2:20 PM               Signed             Show:Clear all  []Manual[x]Template[]Copied    Added by:  [x]Hiren Raza    []Hung for details       02/23/21 1345   Restrictions/Precautions   Restrictions/Precautions Fall Risk;Weight Bearing   Required Braces or Orthoses? No   Lower Extremity Weight Bearing Restrictions   Left Lower Extremity Weight Bearing Weight Bearing As Tolerated   General   Diagnosis Cerebral infarction, left body involvement (right brain)   Other (Comment) pt impulsive and demanding   Vital Signs   Level of Consciousness Alert (0)   Bed Mobility   Rolling Minimal assistance   Supine to Sit Minimal assistance   Transfers   Sit to Stand Contact guard assistance   Stand to sit Contact guard assistance   Exercises   Comments AAROM L LE, 3 x 10 reps   Other exercises   Other exercises?  Yes   Other exercises 1 Ball squeezes, 3 x 10 reps   Other exercises 2 Hip abd. w/ red theraband, 3 x 10 reps   Conditions Requiring Skilled Therapeutic Intervention

## 2021-02-25 NOTE — PLAN OF CARE
Problem: Falls - Risk of:  Goal: Will remain free from falls  Description: Will remain free from falls  2/25/2021 1448 by Analisa Cabral RN  Outcome: Ongoing  2/25/2021 0140 by Melody Waller LPN  Outcome: Ongoing  Goal: Absence of physical injury  Description: Absence of physical injury  2/25/2021 1448 by Analisa Cabral RN  Outcome: Ongoing  2/25/2021 0140 by Melody Waller LPN  Outcome: Ongoing   Up with 1 assist with walker

## 2021-02-25 NOTE — PROGRESS NOTES
02/25/21 8800 02/25/21 0834 02/25/21 0836   Pain Screening   Patient Currently in Pain No  --   --    Pain Assessment   Pain Assessment 0-10  --   --    Pain Level 0  --   --    Vital Signs   Pulse  --   --  90   BP  --   --  105/60   BP Location  --   --  Right upper arm   Bed Mobility   Supine to Sit  --  Minimal assistance  --    Transfers   Sit to Stand  --   --   --    Stand to sit  --   --   --    Bed to Chair  --   --   --    Ambulation   Ambulation?  --   --   --    WB Status  --   --   --    Ambulation 1   Surface  --   --   --    Device  --   --   --    Assistance  --   --   --    Quality of Gait  --   --   --    Distance  --   --   --    Comments  --   --   --    Propulsion 1   Propulsion  --   --   --    Level  --   --   --    Method  --   --   --    Level of Assistance  --   --   --    Description/ Details  --   --   --    Distance  --   --   --    Exercises   Comments  --   --   --    Conditions Requiring Skilled Therapeutic Intervention   Assessment  --   --   --    Activity Tolerance   Activity Tolerance  --   --   --       02/25/21 0837 02/25/21 0842 02/25/21 0848   Pain Screening   Patient Currently in Pain  --   --   --    Pain Assessment   Pain Assessment  --   --   --    Pain Level  --   --   --    Vital Signs   Pulse  --   --   --    BP  --   --   --    BP Location  --   --   --    Bed Mobility   Supine to Sit  --   --   --    Transfers   Sit to Stand Contact guard assistance  --   --    Stand to sit Contact guard assistance;Stand by assistance  --   --    Bed to Chair Contact guard assistance  (With RW)  --   --    Ambulation   Ambulation?  --  Yes  --    WB Status  --  FWBAT LLE  --    Ambulation 1   Surface  --  level tile  --    Device  --  Rolling Walker  --    Assistance  --  Contact guard assistance  --    Quality of Gait  --  3-point antalgic gait pattern  --    Distance  --  15' x2  --    Comments  --  Amb chair to chair; incorporated turns.   --    Propulsion 1 Propulsion  --  Manual  --    Level  --  Level Tile  --    Method  --  RUE;LUE  --    Level of Assistance  --  Stand by assistance  --    Description/ Details  --  Incorporated turns  --    Distance  --  160'  --    Exercises   Comments  --   --  Sitting LLE ROM ex x5 reps. Conditions Requiring Skilled Therapeutic Intervention   Assessment  --   --   --    Activity Tolerance   Activity Tolerance  --   --   --       02/25/21 0849   Pain Screening   Patient Currently in Pain  --    Pain Assessment   Pain Assessment  --    Pain Level  --    Vital Signs   Pulse  --    BP  --    BP Location  --    Bed Mobility   Supine to Sit  --    Transfers   Sit to Stand  --    Stand to sit  --    Bed to Chair  --    Ambulation   Ambulation?  --    WB Status  --    Ambulation 1   Surface  --    Device  --    Assistance  --    Quality of Gait  --    Distance  --    Comments  --    Propulsion 1   Propulsion  --    Level  --    Method  --    Level of Assistance  --    Description/ Details  --    Distance  --    Exercises   Comments  --    Conditions Requiring Skilled Therapeutic Intervention   Assessment Able to amb 15' x2 chair to chair incorporating turns. Activity Tolerance   Activity Tolerance Patient limited by fatigue;Patient limited by pain; Patient limited by endurance   Electronically signed by Ana Pacheco PTA on 2/25/2021 at 9:44 AM

## 2021-02-25 NOTE — PROGRESS NOTES
Moe Childs  [id number]     02/25/21 1125 02/25/21 1126   Subjective   Subjective Pt agreed to therapy this morning.  --    Pain Screening   Patient Currently in Pain No  --    Transfers   Sit to Stand Contact guard assistance  --    Stand to sit Contact guard assistance  --    Bed to Chair Contact guard assistance  --    Ambulation   Ambulation? Yes  --    WB Status FWBAT LLE  --    Ambulation 1   Surface level tile  --    Device Rolling Walker  --    Assistance Contact guard assistance  --    Quality of Gait Pt continues to show different gait deviations and antalgic gait pattern on LLE.  --    Distance 15' x2  --    Exercises   Comments  --  Sitting Noel LE ther ex x 20 reps. Conditions Requiring Skilled Therapeutic Intervention   Body structures, Functions, Activity limitations  --  Decreased functional mobility ; Decreased ROM; Decreased strength;Decreased safe awareness;Decreased cognition;Decreased endurance;Decreased posture; Increased pain;Decreased balance;Decreased coordination   Assessment  --  Pt tolerated amb and sitting ther ex w// some increase in L hip pain and fatigue. Prognosis  --  Fair   Activity Tolerance   Activity Tolerance  --  Patient limited by fatigue;Patient limited by pain; Patient limited by endurance   Electronically signed by Ashkan Marie PTA on 2/25/2021 at 11:28 AM

## 2021-02-25 NOTE — PLAN OF CARE
Problem: Falls - Risk of:  Goal: Will remain free from falls  Description: Will remain free from falls  2/25/2021 0140 by Martina Letty, LPN  Outcome: Ongoing  2/24/2021 1508 by Dale Xiao RN  Outcome: Ongoing  Goal: Absence of physical injury  Description: Absence of physical injury  2/25/2021 0140 by Martina Plate, LPN  Outcome: Ongoing  2/24/2021 1508 by Dale Xiao RN  Outcome: Ongoing     Problem: Mobility - Impaired:  Goal: Mobility will improve  Description: Mobility will improve  2/25/2021 0140 by Martina Plate, LPN  Outcome: Ongoing  2/24/2021 1508 by Dale Xiao RN  Outcome: Ongoing     Problem: Infection:  Goal: Will remain free from infection  Description: Will remain free from infection  2/25/2021 0140 by Martina Plate, LPN  Outcome: Ongoing  2/24/2021 1508 by Dale Xiao RN  Outcome: Ongoing     Problem: Safety:  Goal: Free from accidental physical injury  Description: Free from accidental physical injury  2/25/2021 0140 by Martina Letty, LPN  Outcome: Ongoing  2/24/2021 1508 by Dale Xiao RN  Outcome: Ongoing  Goal: Free from intentional harm  Description: Free from intentional harm  2/25/2021 0140 by Martina Letty, LPN  Outcome: Ongoing  2/24/2021 1508 by Dale Xiao RN  Outcome: Ongoing     Problem: Daily Care:  Goal: Daily care needs are met  Description: Daily care needs are met  2/25/2021 0140 by Martina Letty, LPN  Outcome: Ongoing  2/24/2021 1508 by Dale Xiao RN  Outcome: Ongoing     Problem: Pain:  Goal: Patient's pain/discomfort is manageable  Description: Patient's pain/discomfort is manageable  2/25/2021 0140 by Martina Letty, LPN  Outcome: Ongoing  2/24/2021 1508 by Dale Xiao RN  Outcome: Ongoing  Goal: Pain level will decrease  Description: Pain level will decrease  2/25/2021 0140 by Martina Plate, LPN  Outcome: Ongoing  2/24/2021 1508 by Dale Xiao RN  Outcome: Ongoing  Goal: Control of acute pain  Description: Control of acute pain 2/25/2021 0140 by Clementine Troncoso LPN  Outcome: Ongoing  2/24/2021 1508 by Marie Arroyo RN  Outcome: Ongoing  Goal: Control of chronic pain  Description: Control of chronic pain  2/25/2021 0140 by Clementine Troncoso LPN  Outcome: Ongoing  2/24/2021 1508 by Marie Arroyo RN  Outcome: Ongoing     Problem: Skin Integrity:  Goal: Skin integrity will stabilize  Description: Skin integrity will stabilize  2/25/2021 0140 by Clementine Troncoso LPN  Outcome: Ongoing  2/24/2021 1508 by Marie Arroyo RN  Outcome: Ongoing     Problem: Discharge Planning:  Goal: Patients continuum of care needs are met  Description: Patients continuum of care needs are met  2/25/2021 0140 by Clementine Troncoso LPN  Outcome: Ongoing  2/24/2021 1508 by Marie Arroyo RN  Outcome: Ongoing     Problem: Skin Integrity:  Goal: Will show no infection signs and symptoms  Description: Will show no infection signs and symptoms  2/25/2021 0140 by Clementine Troncoso LPN  Outcome: Ongoing  2/24/2021 1508 by Marie Arroyo RN  Outcome: Ongoing  Goal: Absence of new skin breakdown  Description: Absence of new skin breakdown  2/25/2021 0140 by Clementine Troncoso LPN  Outcome: Ongoing  2/24/2021 1508 by Marie Arroyo RN  Outcome: Ongoing     Problem: Nutrition  Goal: Optimal nutrition therapy  2/25/2021 0140 by Clementine Troncoso LPN  Outcome: Ongoing  2/24/2021 1508 by Marie Arroyo RN  Outcome: Ongoing

## 2021-02-25 NOTE — PROGRESS NOTES
Occupational Therapy     02/25/21 1300   Pain Assessment   Patient Currently in Pain No   Pain Assessment 0-10   Balance   Sitting Balance Stand by assistance   Standing Balance Contact guard assistance   Standing Balance   Time 2 min and 35 sec. Activity 1 handed BUE static standing task. Functional Mobility   Functional - Mobility Device Rolling Walker   Activity Other   Assist Level Stand by assistance   Functional Mobility Comments short distance in therapy VC jayy for wider AMY. Bed mobility   Sit to Supine Minimal assistance   Transfers   Stand Step Transfers Contact guard assistance   Sit to stand Contact guard assistance   Stand to sit Contact guard assistance   Transfer Comments Max VC to reach back to w/c before sitting. Type of ROM/Therapeutic Exercise   Type of ROM/Therapeutic Exercise Pulley   Comment 1 set- 1 min, 2 set-1 min and 39 sec. Assessment   Performance deficits / Impairments Decreased functional mobility ; Decreased ADL status; Decreased strength;Decreased endurance;Decreased high-level IADLs   Treatment Diagnosis Left IT hip fx s/p cephalomedullary nail   Prognosis Good   Timed Code Treatment Minutes 45 Minutes   Activity Tolerance   Activity Tolerance Patient Tolerated treatment well   Safety Devices   Safety Devices in place Yes   Type of devices Bed alarm in place;Call light within reach   Plan   Current Treatment Recommendations Strengthening;Balance Training;Functional Mobility Training;Patient/Caregiver Education & Training;Equipment Evaluation, Education, & procurement; Safety Education & Training;Self-Care / ADL;Endurance Training

## 2021-02-25 NOTE — PROGRESS NOTES
José Mishra  [id number]     02/25/21 1125 02/25/21 1126   Subjective   Subjective Pt agreed to therapy this morning.  --    Pain Screening   Patient Currently in Pain No  --    Transfers   Sit to Stand Contact guard assistance  --    Stand to sit Contact guard assistance  --    Bed to Chair Contact guard assistance  --    Ambulation   Ambulation? Yes  --    WB Status FWBAT LLE  --    Ambulation 1   Surface level tile  --    Device Rolling Walker  --    Assistance Contact guard assistance  --    Quality of Gait Pt continues to show different gait deviations and antalgic gait pattern on LLE.  --    Distance 15' x2  --    Exercises   Comments  --  Sitting Noel LE ther ex x 20 reps. Conditions Requiring Skilled Therapeutic Intervention   Body structures, Functions, Activity limitations  --  Decreased functional mobility ; Decreased ROM; Decreased strength;Decreased safe awareness;Decreased cognition;Decreased endurance;Decreased posture; Increased pain;Decreased balance;Decreased coordination   Assessment  --  Pt tolerated amb and sitting ther ex w// some increase in L hip pain and fatigue. Pt continues to be very unsteady and off balance at times during amb needing CGA-Min A. Prognosis  --  Fair   Activity Tolerance   Activity Tolerance  --  Patient limited by fatigue;Patient limited by pain; Patient limited by endurance   Electronically signed by Re Serrato PTA on 2/25/2021 at 11:46 AM

## 2021-02-26 LAB
GLUCOSE BLD-MCNC: 102 MG/DL (ref 70–99)
GLUCOSE BLD-MCNC: 108 MG/DL (ref 70–99)
GLUCOSE BLD-MCNC: 117 MG/DL (ref 70–99)
GLUCOSE BLD-MCNC: 126 MG/DL (ref 70–99)
PERFORMED ON: ABNORMAL

## 2021-02-26 PROCEDURE — 97110 THERAPEUTIC EXERCISES: CPT

## 2021-02-26 PROCEDURE — 6370000000 HC RX 637 (ALT 250 FOR IP): Performed by: INTERNAL MEDICINE

## 2021-02-26 PROCEDURE — 97116 GAIT TRAINING THERAPY: CPT

## 2021-02-26 PROCEDURE — 99232 SBSQ HOSP IP/OBS MODERATE 35: CPT | Performed by: PSYCHIATRY & NEUROLOGY

## 2021-02-26 PROCEDURE — 6370000000 HC RX 637 (ALT 250 FOR IP): Performed by: PSYCHIATRY & NEUROLOGY

## 2021-02-26 PROCEDURE — 97535 SELF CARE MNGMENT TRAINING: CPT

## 2021-02-26 PROCEDURE — 97530 THERAPEUTIC ACTIVITIES: CPT

## 2021-02-26 PROCEDURE — 2580000003 HC RX 258: Performed by: INTERNAL MEDICINE

## 2021-02-26 PROCEDURE — 82947 ASSAY GLUCOSE BLOOD QUANT: CPT

## 2021-02-26 PROCEDURE — 6360000002 HC RX W HCPCS: Performed by: INTERNAL MEDICINE

## 2021-02-26 PROCEDURE — 1180000000 HC REHAB R&B

## 2021-02-26 RX ADMIN — THERA TABS 1 TABLET: TAB at 08:36

## 2021-02-26 RX ADMIN — ENOXAPARIN SODIUM 40 MG: 100 INJECTION SUBCUTANEOUS at 08:37

## 2021-02-26 RX ADMIN — FOLIC ACID 1 MG: 1 TABLET ORAL at 08:36

## 2021-02-26 RX ADMIN — Medication 81 MG: at 08:37

## 2021-02-26 RX ADMIN — Medication 5 MG: at 21:37

## 2021-02-26 RX ADMIN — OXYCODONE HYDROCHLORIDE AND ACETAMINOPHEN 1 TABLET: 5; 325 TABLET ORAL at 08:37

## 2021-02-26 RX ADMIN — LISINOPRIL 10 MG: 10 TABLET ORAL at 08:36

## 2021-02-26 RX ADMIN — NYSTATIN 500000 UNITS: 100000 SUSPENSION ORAL at 08:37

## 2021-02-26 RX ADMIN — SODIUM CHLORIDE, PRESERVATIVE FREE 10 ML: 5 INJECTION INTRAVENOUS at 21:38

## 2021-02-26 RX ADMIN — ATORVASTATIN CALCIUM 20 MG: 20 TABLET, FILM COATED ORAL at 21:37

## 2021-02-26 RX ADMIN — SODIUM CHLORIDE, PRESERVATIVE FREE 10 ML: 5 INJECTION INTRAVENOUS at 10:08

## 2021-02-26 ASSESSMENT — PAIN DESCRIPTION - FREQUENCY: FREQUENCY: CONTINUOUS

## 2021-02-26 ASSESSMENT — PAIN DESCRIPTION - PAIN TYPE: TYPE: ACUTE PAIN

## 2021-02-26 ASSESSMENT — PAIN DESCRIPTION - DESCRIPTORS: DESCRIPTORS: ACHING;BURNING

## 2021-02-26 ASSESSMENT — PAIN DESCRIPTION - LOCATION: LOCATION: HIP

## 2021-02-26 ASSESSMENT — PAIN DESCRIPTION - ORIENTATION: ORIENTATION: LEFT

## 2021-02-26 ASSESSMENT — PAIN SCALES - GENERAL: PAINLEVEL_OUTOF10: 10

## 2021-02-26 NOTE — PROGRESS NOTES
Segun Thakur  [id number]     02/26/21 1407 02/26/21 1408 02/26/21 1409   Subjective   Subjective Pt agreed to therapy this afternoon. --   --    Pain Screening   Patient Currently in Pain Yes  --   --    Pain Assessment   Pain Assessment 0-10  --   --    Pain Level 8  --   --    Patient's Stated Pain Goal No pain  --   --    Pain Type Acute pain  --   --    Pain Location Hip  --   --    Pain Orientation Left  --   --    Pain Descriptors Aching;Burning  --   --    Pain Frequency Continuous  --   --    Clinical Progression Gradually improving  --   --    Response to Pain Intervention Patient Satisfied  --   --    Multiple Pain Sites No  --   --    Pre Treatment Pain Screening   Pain at present 8  --   --    Scale Used Numeric Score  --   --    Intervention List Patient able to continue with treatment  --   --    Transfers   Sit to Stand  --  Stand by assistance  --    Stand to sit  --  Stand by assistance  --    Ambulation   Ambulation?  --  Yes  --    WB Status  --  FWBAT LLE  --    Ambulation 1   Surface  --  level tile  --    Assistance  --  Contact guard assistance  --    Quality of Gait  --  Pt showed better 3 pt gait, but still antalgic pattern. --    Distance  --  22'  --    Exercises   Comments  --   --  Sitting Noel LE ther ex x 15 reps. Conditions Requiring Skilled Therapeutic Intervention   Assessment  --   --  Pt tolerated amb w/ an increase in L hip pain. Pt tolerated sitting ther ex w/ minimal increase in pain or fatigue.    Prognosis  --   --  Fair   Activity Tolerance   Activity Tolerance  --   --  Patient limited by pain   Electronically signed by Marlon Lyons PTA on 2/26/2021 at 2:10 PM

## 2021-02-26 NOTE — PROGRESS NOTES
Occupational Therapy  Facility/Department: [facility] 8 REHAB UNIT  Daily Treatment Note  NAME: Cosme Richey  : 1945  MRN: [id number]    Date of Service: 2021    Discharge Recommendations:  Home with Home health OT       Assessment   Performance deficits / Impairments: Decreased functional mobility ; Decreased ADL status; Decreased strength;Decreased endurance;Decreased high-level IADLs  Treatment Diagnosis: Left IT hip fx s/p cephalomedullary nail  OT Education: ADL Adaptive Strategies;Transfer Training  Activity Tolerance  Activity Tolerance: Patient Tolerated treatment well  Safety Devices  Safety Devices in place: Yes(Left with PT)  Type of devices: Left in chair         Patient Diagnosis(es): There were no encounter diagnoses. has a past medical history of Cerebral artery occlusion with cerebral infarction ([facility] [address] and Hypertension. has a past surgical history that includes Femur fracture surgery (Left, 2021). Restrictions  Restrictions/Precautions  Restrictions/Precautions: Fall Risk, Weight Bearing  Required Braces or Orthoses?: No  Lower Extremity Weight Bearing Restrictions  Left Lower Extremity Weight Bearing: Weight Bearing As Tolerated  Subjective   General  Chart Reviewed: Yes  Patient assessed for rehabilitation services?: Yes  General Comment  Comments: Pt stated that he was dizzy with standing. Orthostatic BP- see vital signs  Vital Signs  Pulse: 87  BP: 118/69        Objective    Functional Mobility  Functional - Mobility Device: Rolling Walker  Activity: Other  Assist Level: Contact guard assistance  Functional Mobility Comments:  In room     Transfers  Sit to stand: Contact guard assistance  Stand to sit: Contact guard assistance              Plan   Plan Current Treatment Recommendations: Strengthening, Balance Training, Functional Mobility Training, Patient/Caregiver Education & Training, Equipment Evaluation, Education, & procurement, Safety Education & Training, Self-Care / ADL, Endurance Training       OutComes Score       02/26/21 0815   Oral Hygiene   Assistance Needed Setup or clean-up assistance   CARE Score 5   Toileting Hygiene   Assistance Needed Supervision or touching assistance   Comment CGA   CARE Score 4   Shower/Bathe Self   Assistance Needed Partial/moderate assistance   CARE Score 3   Upper Body Dressing   Assistance Needed Setup or clean-up assistance   CARE Score 5   Lower Body Dressing   Assistance Needed Partial/moderate assistance   Comment Min A with reacher   CARE Score 3   Putting On/Taking Off Footwear   Assistance Needed Partial/moderate assistance   Comment Min A with donning socks with sock aide, will need to assess shoes   CARE Score 3     Goals  Short term goals  Time Frame for Short term goals: 1 week  Short term goal 1: Supervision with bathing hygiene. Short term goal 2: Supervision with clothing management/hygiene for toileting. Short term goal 3: Supervision with toilet transfers. Short term goal 4: Supervision with LB dressing using AE. Short term goal 5: Supervision with one-two handed static standing task for 3 minutes. Long term goals  Time Frame for Long term goals : 2 weeks  Long term goal 1: Modified independent with bathing hygiene. Long term goal 2: Modified independent with toileting and toilet transfers. Long term goal 3: Modified independent with overall dressing. Long term goal 4: Patient verbalize DME needs. Patient Goals   Patient goals : Return home independently.        Therapy Time   Individual Concurrent Group Co-treatment   Time In 0815         Time Out 0900         Minutes 45         Timed Code Treatment Minutes: [address], OT

## 2021-02-26 NOTE — PROGRESS NOTES
Occupational Therapy     02/26/21 1430   Pain Assessment   Patient Currently in Pain Yes   Pain Assessment 0-10   Pain Level 8   Pain Type Acute pain   Pain Location Hip   Pain Orientation Left   Balance   Sitting Balance Supervision   Standing Balance Stand by assistance   Standing Balance   Time 4 min   Activity 2 handed static standing task at raised table. Bed mobility   Sit to Supine Minimal assistance  (Required assistance w/LLE.)   Transfers   Stand Step Transfers Contact guard assistance   Sit to stand Stand by assistance   Stand to sit Stand by assistance   Wheelchair Bed Transfers   Wheelchair/Bed - Technique Stand pivot   Equipment Used Wheelchair   Level of Asssistance Contact guard assistance   Type of ROM/Therapeutic Exercise   Type of ROM/Therapeutic Exercise Brijesh   Comment 10# 2 sets of 15 reps. Assessment   Performance deficits / Impairments Decreased functional mobility ; Decreased ADL status; Decreased strength;Decreased endurance;Decreased high-level IADLs   Treatment Diagnosis Left IT hip fx s/p cephalomedullary nail   Prognosis Good   Timed Code Treatment Minutes 45 Minutes   Activity Tolerance   Activity Tolerance Patient Tolerated treatment well   Safety Devices   Safety Devices in place Yes   Type of devices Call light within reach; Bed alarm in place   Plan   Current Treatment Recommendations Strengthening;Balance Training;Functional Mobility Training;Patient/Caregiver Education & Training;Equipment Evaluation, Education, & procurement; Safety Education & Training;Self-Care / ADL;Endurance Training

## 2021-02-26 NOTE — PROGRESS NOTES
Alec Eufemia  [id number]     02/26/21 8431 02/26/21 0935 02/26/21 0936   Subjective   Subjective Pt agreed to therapy this morning. --   --    Pain Screening   Patient Currently in Pain Yes  --   --    Pain Assessment   Pain Assessment 0-10  --   --    Patient's Stated Pain Goal 8  --   --    Pain Type Acute pain  --   --    Pain Location Hip  --   --    Pain Orientation Left  --   --    Pain Descriptors Aching;Burning  --   --    Pain Frequency Continuous  --   --    Pain Onset On-going  --   --    Clinical Progression Gradually improving  --   --    Response to Pain Intervention None  --   --    Multiple Pain Sites No  --   --    Pre Treatment Pain Screening   Pain at present 8  --   --    Scale Used Numeric Score  --   --    Intervention List Patient able to continue with treatment  --   --    Bed Mobility   Rolling  --  Stand by assistance  --    Supine to Sit  --  Minimal assistance  (LLE)  --    Sit to Supine  --  Minimal assistance  (LLE)  --    Transfers   Sit to Stand  --  Stand by assistance  --    Stand to sit  --  Stand by assistance  --    Bed to Chair  --  Contact guard assistance  --    Car Transfer  --  Minimal Assistance  (LLE in and out)  --    Ambulation   Ambulation?  --   --  Yes   WB Status  --   --  FWBAT LLE   Ambulation 1   Surface  --   --  level tile   Device  --   --  Rolling Walker   Assistance  --   --  Contact guard assistance   Quality of Gait  --   --  Pt showed better 3 pt gait, but still antalgic pattern. Distance  --   --  20', 25'   Stairs/Curb   Stairs?  --   --  Yes   Stairs   # Steps   --   --  4   Rails  --   --  Bilateral   Curbs  --   --  4\"   Device  --   --  No Device   Assistance  --   --  Minimal assistance   Comment  --   --  Pt amb up and down stairs using correct technique, but was very unsteady.    Exercises   Comments  --   --   --    Conditions Requiring Skilled Therapeutic Intervention   Body structures, Functions, Activity limitations  --   --   -- Assessment  --   --   --    Prognosis  --   --   --    Activity Tolerance   Activity Tolerance  --   --   --       02/26/21 0937   Subjective   Subjective  --    Pain Screening   Patient Currently in Pain  --    Pain Assessment   Pain Assessment  --    Patient's Stated Pain Goal  --    Pain Type  --    Pain Location  --    Pain Orientation  --    Pain Descriptors  --    Pain Frequency  --    Pain Onset  --    Clinical Progression  --    Response to Pain Intervention  --    Multiple Pain Sites  --    Pre Treatment Pain Screening   Pain at present  --    Scale Used  --    Intervention List  --    Bed Mobility   Rolling  --    Supine to Sit  --    Sit to Supine  --    Transfers   Sit to Stand  --    Stand to sit  --    Bed to Chair  --    Car Transfer  --    Ambulation   Ambulation?  --    WB Status  --    Ambulation 1   Surface  --    Device  --    Assistance  --    Quality of Gait  --    Distance  --    Stairs/Curb   Stairs? --    Stairs   # Steps   --    Rails  --    Curbs  --    Device  --    Assistance  --    Comment  --    Exercises   Comments Practiced Car TF and Stairs training. Conditions Requiring Skilled Therapeutic Intervention   Body structures, Functions, Activity limitations Decreased functional mobility ; Decreased ADL status; Decreased strength;Decreased endurance   Assessment Pt performed Car TF well, but needed Min A getting LLE in and out of car. Pt also continues to need Min A getting LLE in and out of bed as well. Pt improving w/ Sit-Stands only needing SBA for safety and cues. Pt had an increase in L hip pain and fatigued quickly during Stair training, but performed technique well. Pt did require Min A for Stairs due to unsteadiness. Pt toleated amb w/ an increase in L hip pain as well.    Prognosis Fair   Activity Tolerance   Activity Tolerance Patient limited by pain   Electronically signed by Brandi Tilley PTA on 2/26/2021 at 10:01 AM

## 2021-02-26 NOTE — PROGRESS NOTES
Patient:   Bethany Azul  MR#:    [id number]   Room:    0813/813-01   YOB: 1945  Date of Progress Note: 2/26/2021  Time of Note                           8:10 AM  Consulting Physician:   Kristin Martin M.D. Attending Physician:  Kristin Martin MD     Chief complaint Acute ischemic stroke/left hip fracture and repair       S:This 67 y.o. male with history of HTN,cerebal artery occlusion, and TIA. He presented to [facility] ER on 2/13/21 after having a fall on the ice, landing on his left hip. X-ray done revealed an acute mildly discplaced closed instratrochanteric left hip fracture. He was admitted to the hospitalist service with a consult for orthopedic surgery. He was seen by Dr. Leola Qureshi, who recommended cephalomedullary nailing of his left hip fracture. Patient was in agreement and proceeded to surgery. Patient tolerted the procedure well. He will be weightbearing as tolerated on his left lower extremity and will require DVT prophylaxis x 4 weeks. On the night of the 2/14, patient started having some agitation,confusion,delirium with hallucinations of bugs crawling on the ceiling and combative at times. Urine analysis,BLC and CXR were unremarkable. On the 2/15, he was noted to have a posterior lean when working with therapy. Neurology was consulted on 2/16. MRI done revealed a small brainstem ischemic stroke. His confusion was more likely encephalopathy. Patient was intermittent confusion but was easily redirected, no longer combative or requiring a sitter. Patient continued to have difficulty advancing his left lower extremity. He is participating in both PT/OT. He is felt to need a stay on Rehab to work towards his goal of returning home with his wife. No acute issues.     REVIEW OF SYSTEMS:  Constitutional: No fevers No chills  Neck:No stiffness  Respiratory: No shortness of breath  Cardiovascular: some incisional chest pain No palpitations  Gastrointestinal: No abdominal pain Genitourinary: No Dysuria  Neurological: No headache, no confusion    Past Medical History:      Diagnosis Date    Cerebral artery occlusion with cerebral infarction ([facility] [address]     tia    Hypertension        Past Surgical History:      Procedure Laterality Date    FEMUR FRACTURE SURGERY Left 2/13/2021    FEMUR IM NAIL GILDARDO INSERTION performed by Pb Mclaughlin MD at [address] [place] [address]       Medications in Hospital:      Current Facility-Administered Medications:     lisinopril (PRINIVIL;ZESTRIL) tablet 10 mg, 10 mg, Oral, Daily, Robb Reddy MD    nystatin (MYCOSTATIN) 457124 UNIT/ML suspension 500,000 Units, 5 mL, Oral, 4x Daily, Robb Reddy MD, 500,000 Units at 02/25/21 1642    enoxaparin (LOVENOX) injection 40 mg, 40 mg, Subcutaneous, Daily, Everett Claudio MD, 40 mg at 02/25/21 0810    oxyCODONE-acetaminophen (PERCOCET) 5-325 MG per tablet 1 tablet, 1 tablet, Oral, Q6H PRN, Everett Claudio MD, 1 tablet at 02/23/21 2111    acetaminophen (TYLENOL) tablet 650 mg, 650 mg, Oral, Q6H PRN **OR** acetaminophen (TYLENOL) suppository 650 mg, 650 mg, Rectal, Q6H PRN, Everett Claudio MD    magnesium hydroxide (MILK OF MAGNESIA) 400 MG/5ML suspension 30 mL, 30 mL, Oral, Daily PRN, Everett Claudio MD    potassium chloride (KLOR-CON M) extended release tablet 40 mEq, 40 mEq, Oral, PRN **OR** potassium bicarb-citric acid (EFFER-K) effervescent tablet 40 mEq, 40 mEq, Oral, PRN **OR** potassium chloride 10 mEq/100 mL IVPB (Peripheral Line), 10 mEq, Intravenous, PRN, Everett Claudio MD    promethazine (PHENERGAN) tablet 12.5 mg, 12.5 mg, Oral, Q6H PRN **OR** ondansetron (ZOFRAN) injection 4 mg, 4 mg, Intravenous, Q6H PRN, Everett Claudio MD, 4 mg at 02/23/21 1951    sodium chloride flush 0.9 % injection 10 mL, 10 mL, Intravenous, 2 times per day, Everett Claudio MD, 10 mL at 02/25/21 2153    sodium chloride flush 0.9 % injection 10 mL, 10 mL, Intravenous, PRN, Everett Claudio MD   dextrose 5 % solution, 100 mL/hr, Intravenous, PRN, Everett Claudio MD    dextrose 50 % IV solution, 12.5 g, Intravenous, PRN, Everett Claudio MD    glucagon (rDNA) injection 1 mg, 1 mg, Intramuscular, PRN, Everett Claudio MD    glucose (GLUTOSE) 40 % oral gel 15 g, 15 g, Oral, PRN, Everett Claudio MD    aspirin EC tablet 81 mg, 81 mg, Oral, Daily, Everett Claudio MD, 81 mg at 02/25/21 0809    atorvastatin (LIPITOR) tablet 20 mg, 20 mg, Oral, Nightly, Everett Claudio MD, 20 mg at 02/25/21 2150    [START ON 2/27/2021] cyanocobalamin injection 1,000 mcg, 1,000 mcg, Intramuscular, Q7 Days **AND** [START ON 5/6/2021] cyanocobalamin injection 1,000 mcg, 1,000 mcg, Intramuscular, Q30 Days, Everett Claudio MD    folic acid (FOLVITE) tablet 1 mg, 1 mg, Oral, Daily, Everett Claudio MD, 1 mg at 02/25/21 0810    insulin lispro (HUMALOG) injection vial 0-6 Units, 0-6 Units, Subcutaneous, TID WC, Everett Claudio MD, Stopped at 02/24/21 1623    insulin lispro (HUMALOG) injection vial 0-3 Units, 0-3 Units, Subcutaneous, Nightly, Everett Claudio MD    melatonin disintegrating tablet 5 mg, 5 mg, Oral, Nightly, Everett Claudio MD, 5 mg at 02/25/21 2150    multivitamin 1 tablet, 1 tablet, Oral, Daily, Everett Claudio MD, 1 tablet at 02/25/21 0810    nicotine (NICODERM CQ) 21 MG/24HR 1 patch, 1 patch, Transdermal, Daily, Everett Claudio MD, 1 patch at 02/25/21 2029    traZODone (DESYREL) tablet 50 mg, 50 mg, Oral, Nightly PRN, Everett Claudio MD, 50 mg at 02/24/21 2155    acetaminophen (TYLENOL) tablet 650 mg, 650 mg, Oral, Q4H PRN, Robb Reddy MD, 650 mg at 02/23/21 7199    polyethylene glycol (GLYCOLAX) packet 17 g, 17 g, Oral, Daily PRN, Robb Reddy MD    Allergies:  Patient has no known allergies. Social History:   TOBACCO:   reports that he has been smoking. He has been smoking about 1.00 pack per day.  He has never used smokeless tobacco. ETOH:   reports no history of alcohol use. Family History:   No family history on file. PHYSICAL EXAM:  BP (!) 143/77   Pulse 75   Temp 97.2 °F (36.2 °C) (Temporal)   Resp 18   Ht 6' (1.829 m)   Wt 195 lb (88.5 kg)   SpO2 94%   BMI 26.45 kg/m²     Constitutional  well developed, well nourished. Eyes  conjunctiva normal.   Ear, nose, throat - No scars, masses, or lesions over external nose or ears, no atrophy of tongue  Neck-symmetric, no masses noted, no jugular vein distension  Respiration- chest wall appears symmetric, good expansion,   normal effort without use of accessory muscles  Musculoskeletal  no significant wasting of muscles noted, no bony deformities  Extremities-no clubbing, cyanosis or edema  Skin  warm, dry, and intact. No rash, erythema, or pallor. Psychiatric  mood, affect, and behavior appear normal.      Neurological exam  Awake, alert, fluent oriented appropriate affect  Attention and concentration appear appropriate  Recent and remote memory appears unremarkable  Speech normal without dysarthria  No clear issues with language of fund of knowledge     Cranial Nerve Exam     CN III, IV,VI-EOMI, No nystagmus, conjugate eye movements, no ptosis    CN VII-no facial assymetry       Motor Exam  antigravity throughout upper and lower extremities bilaterally      Tremors- no tremors in hands or head noted     Gait  Not tested      Nursing/pcp notes, imaging,labs and vitals reviewed.      PT,OT and/or speech notes reviewed    Lab Results   Component Value Date    WBC 10.3 02/25/2021    HGB 8.8 (L) 02/25/2021    HCT 27.6 (L) 02/25/2021    MCV 96.8 (H) 02/25/2021     02/25/2021     Lab Results   Component Value Date     02/25/2021    K 4.2 02/25/2021     02/25/2021    CO2 31 (H) 02/25/2021    BUN 39 (H) 02/25/2021    CREATININE 0.9 02/25/2021    GLUCOSE 106 02/25/2021    CALCIUM 8.6 (L) 02/25/2021    PROT 5.8 (L) 02/25/2021    LABALBU 3.0 (L) 02/25/2021 BILITOT 0.8 02/25/2021    ALKPHOS 104 02/25/2021    AST 23 02/25/2021    ALT 25 02/25/2021    LABGLOM >60 02/25/2021    GFRAA >59 02/25/2021     Lab Results   Component Value Date    INR 1.11 02/22/2021    INR 1.07 02/13/2021    PROTIME 14.2 02/22/2021    PROTIME 13.9 02/13/2021     Siomara Hammer PTA   Therapy Assistant   Physical Therapy   Progress Notes   Signed   Date of Service:  2/25/2021 11:46 AM               Signed             Show:Clear all  []Manual[x]Template[]Copied    Added by:  [x]Homar Leal PTA    []Hung for details  Hunter Kvng  [id number]       02/25/21 1125 02/25/21 1126   Subjective   Subjective Pt agreed to therapy this morning.  --    Pain Screening   Patient Currently in Pain No  --    Transfers   Sit to Stand Contact guard assistance  --    Stand to sit Contact guard assistance  --    Bed to Chair Contact guard assistance  --    Ambulation   Ambulation? Yes  --    WB Status FWBAT LLE  --    Ambulation 1   Surface level tile  --    Device Rolling Walker  --    Assistance Contact guard assistance  --    Quality of Gait Pt continues to show different gait deviations and antalgic gait pattern on LLE.  --    Distance 15' x2  --    Exercises   Comments  --  Sitting Noel LE ther ex x 20 reps. Conditions Requiring Skilled Therapeutic Intervention   Body structures, Functions, Activity limitations  --  Decreased functional mobility ; Decreased ROM; Decreased strength;Decreased safe awareness;Decreased cognition;Decreased endurance;Decreased posture; Increased pain;Decreased balance;Decreased coordination   Assessment  --  Pt tolerated amb and sitting ther ex w// some increase in L hip pain and fatigue. Pt continues to be very unsteady and off balance at times during amb needing CGA-Min A. Prognosis  --  Fair   Activity Tolerance   Activity Tolerance  --  Patient limited by fatigue;Patient limited by pain; Patient limited by endurance Electronically signed by Julito Tracey PTA on 2/25/2021 at 11:46 AM               Cosigned by: Carmen Taveras, JOSEPH at 2/25/2021  2:08 PM   Revision History                          RECORD REVIEW: Previous medical records, medications were reviewed at today's visit    IMPRESSION:   1. Acute ischemic stroke-ASA/statin  2. Hyperlipidemia-on statin  3. Vitamin B12 deficiency-on Vitamin B12  4. HTN-on meds monitor   5. Smoker-on nicoderm patch  6. GI-bowel regimen  7. Pain control -Percocet prn  8. S/p left hip surgery-WBAT  9. DVT prophylaxis-Lovenox  10. Encephalopathy-metabolic encephalopathy monitor   11. PT/OT  12.  Insomnia-melatonin    Continue present care      Expected duration and frequency therapy: 180 minutes per day, 5 days per week    [address] [place] [address]  [phone] CELL  Dr Peña [facility]

## 2021-02-26 NOTE — PROGRESS NOTES
Physician Progress Note      PATIENT:               Jaqui Langston  CSN #:                  [id number]  :                       1945  ADMIT DATE:       2021 11:14 AM  Letty Rodriguez DATE:        2021 3:42 PM  RESPONDING  PROVIDER #:        Cris Roberto MD          QUERY TEXT:    Pt admitted with fractured left femur and has encephalopathy documented. If   possible, please document in progress notes and discharge summary further   specificity regarding the type of encephalopathy:      The medical record reflects the following:  Risk Factors: s/p cephalomedullary nailing left hip fracture, post-op   antibiotics of Vancomycin and Maxipime  Clinical Indicators: intermittent confusion, seeing bugs, refused to cooperate   with physical exam, B12 level <150, neuro pnotes  \"MRI brain with small   area of restriction diffusion. .....doubt this is related to patient's   confusion which appears more of an encephalopathy'  Treatment: Neuro consult, EEG, CT head, MRI brain, Ativan, sitter at bedside,   Vitamin B-12 injections, IV thiamine/multivitamin/folic acid, stopping   antibiotics as \"may be contributing to confusion\"    Thank you,  [facility], CDS  [phone]  Options provided:  -- Metabolic encephalopathy  -- Toxic encephalopathy  -- Encephalopathy due to ###, # (please specify). -- Toxic metabolic encephalopathy  -- Other - I will add my own diagnosis  -- Disagree - Not applicable / Not valid  -- Disagree - Clinically unable to determine / Unknown  -- Refer to Clinical Documentation Reviewer    PROVIDER RESPONSE TEXT:    This patient has metabolic encephalopathy.     Query created by: Rachel Solis on 2021 7:32 AM      Electronically signed by:  Cris Roberto MD 2021 11:20 AM

## 2021-02-27 LAB
GLUCOSE BLD-MCNC: 103 MG/DL (ref 70–99)
GLUCOSE BLD-MCNC: 111 MG/DL (ref 70–99)
GLUCOSE BLD-MCNC: 114 MG/DL (ref 70–99)
GLUCOSE BLD-MCNC: 121 MG/DL (ref 70–99)
GLUCOSE BLD-MCNC: 175 MG/DL (ref 70–99)
PERFORMED ON: ABNORMAL

## 2021-02-27 PROCEDURE — 97530 THERAPEUTIC ACTIVITIES: CPT

## 2021-02-27 PROCEDURE — 6370000000 HC RX 637 (ALT 250 FOR IP): Performed by: PSYCHIATRY & NEUROLOGY

## 2021-02-27 PROCEDURE — 82947 ASSAY GLUCOSE BLOOD QUANT: CPT

## 2021-02-27 PROCEDURE — 1180000000 HC REHAB R&B

## 2021-02-27 PROCEDURE — 6370000000 HC RX 637 (ALT 250 FOR IP): Performed by: INTERNAL MEDICINE

## 2021-02-27 PROCEDURE — 6360000002 HC RX W HCPCS: Performed by: INTERNAL MEDICINE

## 2021-02-27 PROCEDURE — 99232 SBSQ HOSP IP/OBS MODERATE 35: CPT | Performed by: PSYCHIATRY & NEUROLOGY

## 2021-02-27 PROCEDURE — 97110 THERAPEUTIC EXERCISES: CPT

## 2021-02-27 PROCEDURE — 97116 GAIT TRAINING THERAPY: CPT

## 2021-02-27 PROCEDURE — 2580000003 HC RX 258: Performed by: INTERNAL MEDICINE

## 2021-02-27 RX ADMIN — SODIUM CHLORIDE, PRESERVATIVE FREE 10 ML: 5 INJECTION INTRAVENOUS at 09:52

## 2021-02-27 RX ADMIN — ATORVASTATIN CALCIUM 20 MG: 20 TABLET, FILM COATED ORAL at 21:56

## 2021-02-27 RX ADMIN — THERA TABS 1 TABLET: TAB at 09:35

## 2021-02-27 RX ADMIN — CYANOCOBALAMIN 1000 MCG: 1000 INJECTION, SOLUTION INTRAMUSCULAR; SUBCUTANEOUS at 09:36

## 2021-02-27 RX ADMIN — SODIUM CHLORIDE, PRESERVATIVE FREE 10 ML: 5 INJECTION INTRAVENOUS at 21:56

## 2021-02-27 RX ADMIN — LISINOPRIL 10 MG: 10 TABLET ORAL at 09:35

## 2021-02-27 RX ADMIN — Medication 5 MG: at 21:56

## 2021-02-27 RX ADMIN — OXYCODONE HYDROCHLORIDE AND ACETAMINOPHEN 1 TABLET: 5; 325 TABLET ORAL at 10:37

## 2021-02-27 RX ADMIN — ENOXAPARIN SODIUM 40 MG: 100 INJECTION SUBCUTANEOUS at 09:35

## 2021-02-27 RX ADMIN — FOLIC ACID 1 MG: 1 TABLET ORAL at 09:35

## 2021-02-27 RX ADMIN — Medication 81 MG: at 09:34

## 2021-02-27 ASSESSMENT — PAIN DESCRIPTION - PROGRESSION
CLINICAL_PROGRESSION: GRADUALLY IMPROVING
CLINICAL_PROGRESSION: GRADUALLY WORSENING

## 2021-02-27 ASSESSMENT — PAIN SCALES - GENERAL
PAINLEVEL_OUTOF10: 4
PAINLEVEL_OUTOF10: 4

## 2021-02-27 ASSESSMENT — PAIN DESCRIPTION - ORIENTATION: ORIENTATION: LEFT

## 2021-02-27 ASSESSMENT — PAIN DESCRIPTION - PAIN TYPE
TYPE: ACUTE PAIN
TYPE: ACUTE PAIN

## 2021-02-27 ASSESSMENT — PAIN DESCRIPTION - DESCRIPTORS: DESCRIPTORS: ACHING;SORE

## 2021-02-27 ASSESSMENT — PAIN DESCRIPTION - ONSET: ONSET: ON-GOING

## 2021-02-27 ASSESSMENT — PAIN DESCRIPTION - FREQUENCY: FREQUENCY: CONTINUOUS

## 2021-02-27 ASSESSMENT — PAIN DESCRIPTION - LOCATION
LOCATION: HIP
LOCATION: HIP

## 2021-02-27 NOTE — PROGRESS NOTES
Bethany Azul  [id number]     02/27/21 1130 02/27/21 1131 02/27/21 1132   Subjective   Subjective Pt agreed to therapy this morning. --   --    Bed Mobility   Rolling Stand by assistance  --   --    Supine to Sit Stand by assistance  (Using Leg  on LLE)  --   --    Sit to Supine Stand by assistance  (Using Leg  on LLE.)  --   --    Transfers   Sit to Stand  --  Stand by assistance  --    Stand to sit  --  Stand by assistance  --    Bed to Chair  --  Contact guard assistance  --    Ambulation   Ambulation?  --  Yes  --    WB Status  --  FWBAT LLE  --    Ambulation 1   Surface  --  level tile  --    Device  --  Rolling Walker  --    Assistance  --  Contact guard assistance  --    Quality of Gait  --  Pt showed better 3 pt gait, but still antalgic pattern. --    Distance  --  30'x2  --    Exercises   Comments  --  Practiced bed mobility on mat table using Leg  on LLE.  --    Conditions Requiring Skilled Therapeutic Intervention   Body structures, Functions, Activity limitations  --   --  Decreased functional mobility ; Decreased ADL status; Decreased strength;Decreased endurance   Assessment  --   --  Practiced bed mobility on mat table set at Pt's own bed height using Leg  on LLE. Pt is able to get LLE on and off mat table using Leg . Pt to continues using  for bed mobility. Pt tolerated increase in amb distance well.    Prognosis  --   --  Fair   Activity Tolerance   Activity Tolerance  --   --  Patient limited by pain   Electronically signed by Selina Watson PTA on 2/27/2021 at 11:34 AM

## 2021-02-27 NOTE — PROGRESS NOTES
Patient:   Dina Jolley  MR#:    [id number]   Room:    0813/813-01   YOB: 1945  Date of Progress Note: 2/27/2021  Time of Note                           5:44 PM  Consulting Physician:   Laverne Jones M.D. Attending Physician:  Laverne Jones MD     Chief complaint Acute ischemic stroke/left hip fracture and repair       S:This 67 y.o. male with history of HTN,cerebal artery occlusion, and TIA. He presented to [facility] ER on 2/13/21 after having a fall on the ice, landing on his left hip. X-ray done revealed an acute mildly discplaced closed instratrochanteric left hip fracture. He was admitted to the hospitalist service with a consult for orthopedic surgery. He was seen by Dr. Gissel Brunson, who recommended cephalomedullary nailing of his left hip fracture. Patient was in agreement and proceeded to surgery. Patient tolerted the procedure well. He will be weightbearing as tolerated on his left lower extremity and will require DVT prophylaxis x 4 weeks. On the night of the 2/14, patient started having some agitation,confusion,delirium with hallucinations of bugs crawling on the ceiling and combative at times. Urine analysis,BLC and CXR were unremarkable. On the 2/15, he was noted to have a posterior lean when working with therapy. Neurology was consulted on 2/16. MRI done revealed a small brainstem ischemic stroke. His confusion was more likely encephalopathy. Patient was intermittent confusion but was easily redirected, no longer combative or requiring a sitter. Patient continued to have difficulty advancing his left lower extremity. He is participating in both PT/OT. He is felt to need a stay on Rehab to work towards his goal of returning home with his wife. No new issues.     REVIEW OF SYSTEMS:  Constitutional: No fevers No chills  Neck:No stiffness  Respiratory: No shortness of breath  Cardiovascular: some incisional chest pain No palpitations  Gastrointestinal: No abdominal pain   dextrose 50 % IV solution, 12.5 g, Intravenous, PRN, Everett Claudio MD    glucagon (rDNA) injection 1 mg, 1 mg, Intramuscular, PRN, Everett Claudio MD    glucose (GLUTOSE) 40 % oral gel 15 g, 15 g, Oral, PRN, Everett Claudio MD    aspirin EC tablet 81 mg, 81 mg, Oral, Daily, Everett Claudio MD, 81 mg at 02/27/21 0934    atorvastatin (LIPITOR) tablet 20 mg, 20 mg, Oral, Nightly, Everett Claudio MD, 20 mg at 02/26/21 2137    cyanocobalamin injection 1,000 mcg, 1,000 mcg, Intramuscular, Q7 Days, 1,000 mcg at 02/27/21 0936 **AND** [START ON 5/6/2021] cyanocobalamin injection 1,000 mcg, 1,000 mcg, Intramuscular, Q30 Days, Everett Claudio MD    folic acid (FOLVITE) tablet 1 mg, 1 mg, Oral, Daily, Everett Claudio MD, 1 mg at 02/27/21 0935    insulin lispro (HUMALOG) injection vial 0-6 Units, 0-6 Units, Subcutaneous, TID WC, Everett Claudio MD, Stopped at 02/24/21 1623    insulin lispro (HUMALOG) injection vial 0-3 Units, 0-3 Units, Subcutaneous, Nightly, Everett Claudio MD    melatonin disintegrating tablet 5 mg, 5 mg, Oral, Nightly, Everett Claudio MD, 5 mg at 02/26/21 2137    multivitamin 1 tablet, 1 tablet, Oral, Daily, Everett Claudio MD, 1 tablet at 02/27/21 0935    nicotine (NICODERM CQ) 21 MG/24HR 1 patch, 1 patch, Transdermal, Daily, Everett Claudio MD, 1 patch at 02/27/21 3364    traZODone (DESYREL) tablet 50 mg, 50 mg, Oral, Nightly PRN, Everett Claudio MD, 50 mg at 02/24/21 2155    acetaminophen (TYLENOL) tablet 650 mg, 650 mg, Oral, Q4H PRN, Robb Reddy MD, 650 mg at 02/23/21 1459    polyethylene glycol (GLYCOLAX) packet 17 g, 17 g, Oral, Daily PRN, Robb Reddy MD    Allergies:  Patient has no known allergies. Social History:   TOBACCO:   reports that he has been smoking. He has been smoking about 1.00 pack per day. He has never used smokeless tobacco.  ETOH:   reports no history of alcohol use.     Family History: AST 23 02/25/2021    ALT 25 02/25/2021    LABGLOM >60 02/25/2021    GFRAA >59 02/25/2021     Lab Results   Component Value Date    INR 1.11 02/22/2021    INR 1.07 02/13/2021    PROTIME 14.2 02/22/2021    PROTIME 13.9 02/13/2021     Shana Marie PTA   Therapy Assistant   Physical Therapy   Progress Notes   Signed   Date of Service:  2/25/2021 11:46 AM               Signed             Show:Clear all  []Manual[x]Template[]Copied    Added by:  [x]Homar Leal PTA    []Hung for details  Harley Harbert  [id number]       02/25/21 1125 02/25/21 1126   Subjective   Subjective Pt agreed to therapy this morning.  --    Pain Screening   Patient Currently in Pain No  --    Transfers   Sit to Stand Contact guard assistance  --    Stand to sit Contact guard assistance  --    Bed to Chair Contact guard assistance  --    Ambulation   Ambulation? Yes  --    WB Status FWBAT LLE  --    Ambulation 1   Surface level tile  --    Device Rolling Walker  --    Assistance Contact guard assistance  --    Quality of Gait Pt continues to show different gait deviations and antalgic gait pattern on LLE.  --    Distance 15' x2  --    Exercises   Comments  --  Sitting Noel LE ther ex x 20 reps. Conditions Requiring Skilled Therapeutic Intervention   Body structures, Functions, Activity limitations  --  Decreased functional mobility ; Decreased ROM; Decreased strength;Decreased safe awareness;Decreased cognition;Decreased endurance;Decreased posture; Increased pain;Decreased balance;Decreased coordination   Assessment  --  Pt tolerated amb and sitting ther ex w// some increase in L hip pain and fatigue. Pt continues to be very unsteady and off balance at times during amb needing CGA-Min A. Prognosis  --  Fair   Activity Tolerance   Activity Tolerance  --  Patient limited by fatigue;Patient limited by pain; Patient limited by endurance   Electronically signed by Shana Marie PTA on 2/25/2021 at 11:46 AM    Cosigned by: Marybel Winslow, PT at 2/25/2021  2:08 PM   Revision History                          RECORD REVIEW: Previous medical records, medications were reviewed at today's visit    IMPRESSION:   1. Acute ischemic stroke-ASA/statin  2. Hyperlipidemia-on statin  3. Vitamin B12 deficiency-on Vitamin B12  4. HTN-on meds monitor   5. Smoker-on nicoderm patch  6. GI-bowel regimen  7. Pain control -Percocet prn  8. S/p left hip surgery-WBAT  9. DVT prophylaxis-Lovenox  10. Encephalopathy-metabolic encephalopathy monitor   11. PT/OT  12.  Insomnia-melatonin    Continue current care      Expected duration and frequency therapy: 180 minutes per day, 5 days per week    [address] [facility]  [phone] CELL  Dr Eriberto Arzola

## 2021-02-27 NOTE — PROGRESS NOTES
unspecified     CHIEF COMPLAINT:  Acute ischemic stroke/left hip fracture and repair           HISTORY OF PRESENT ILLNESS:   This 67 y.o. male  with history of HTN,cerebal artery occlusion, and TIA. He presented to [facility] ER on 2/13/21 after having a fall on the ice, landing on his left hip. X-ray done revealed an acute mildly discplaced closed instratrochanteric left hip fracture. He was admitted to the hospitalist service with a consult for orthopedic surgery. He was seen by Dr. Kwan Arnold, who recommended cephalomedullary nailing of his left hip fracture. Patient was in agreement and proceeded to surgery. Patient tolerted the procedure well. He will be weightbearing as tolerated on his left lower extremity and will require DVT prophylaxis x 4 weeks. On the night of the 2/14, patient started having some agitation,confusion,delirium with hallucinations of bugs crawling on the ceiling and combative at times. Urine analysis,BLC and CXR were unremarkable. On the 2/15, he was noted to have a posterior lean when working with therapy. Neurology was consulted on 2/16. MRI done revealed a small brainstem ischemic stroke. His confusion was more likely encephalopathy. Patient was intermittent confusion but was easily redirected, no longer combative or requiring a sitter. Patient continued to have difficulty advancing his left lower extremity. He is participating in both PT/OT.  He is felt to need a stay on Rehab to work towards his goal of returning home with his wife.    ____________________ _____________________ ________________   Physician Signature      Physician Name (print)   Physician NPI          Date

## 2021-02-27 NOTE — PROGRESS NOTES
[place]  [id number]     02/27/21 1502 02/27/21 1504 02/27/21 1505   Subjective   Subjective Pt agreed to therapy this afternoon. --   --    Pain Screening   Patient Currently in Pain Yes  --   --    Pain Assessment   Pain Assessment 0-10  --   --    Pain Level 4  --   --    Patient's Stated Pain Goal No pain  --   --    Pain Type Acute pain  --   --    Pain Location Hip  --   --    Pain Orientation Left  --   --    Pain Descriptors Aching  --   --    Pain Frequency Continuous  --   --    Pain Onset On-going  --   --    Clinical Progression Gradually improving  --   --    Response to Pain Intervention Patient Satisfied  --   --    Multiple Pain Sites No  --   --    Pre Treatment Pain Screening   Pain at present 4  --   --    Scale Used Numeric Score  --   --    Intervention List Patient able to continue with treatment  --   --    Exercises   Comments  --  Supine Noel LE ther ex Hip protocol x 10-15 reps. --    Conditions Requiring Skilled Therapeutic Intervention   Body structures, Functions, Activity limitations  --   --  Decreased functional mobility ; Decreased ADL status; Decreased strength;Decreased endurance   Assessment  --   --  Pt performed Supine Noel LE ther ex Hip protocol this afternoon. Pt tolerated supine ther ex w/ some increase in L hip pain.    Prognosis  --   --  Fair   Activity Tolerance   Activity Tolerance  --   --  Patient limited by pain   Electronically signed by Cyn Ruffin PTA on 2/27/2021 at 3:06 PM

## 2021-02-27 NOTE — PLAN OF CARE
Problem: Falls - Risk of:  Goal: Will remain free from falls  Description: Will remain free from falls  2/27/2021 0141 by Michaela Abraham LPN  Outcome: Ongoing  2/26/2021 1435 by Jose Daniel Rivera RN  Outcome: Ongoing  Goal: Absence of physical injury  Description: Absence of physical injury  2/27/2021 0141 by Michaela Abraham LPN  Outcome: Ongoing  2/26/2021 1435 by Jose Daniel Rivera RN  Outcome: Ongoing     Problem: Mobility - Impaired:  Goal: Mobility will improve  Description: Mobility will improve  2/27/2021 0141 by Michaela Abraham LPN  Outcome: Ongoing  2/26/2021 1435 by Jose Daniel Rivera RN  Outcome: Ongoing     Problem: Infection:  Goal: Will remain free from infection  Description: Will remain free from infection  2/27/2021 0141 by Michaela Abraham LPN  Outcome: Ongoing  2/26/2021 1435 by Jose Daniel Rivera RN  Outcome: Ongoing     Problem: Safety:  Goal: Free from accidental physical injury  Description: Free from accidental physical injury  2/27/2021 0141 by Michaela Abraham LPN  Outcome: Ongoing  2/26/2021 1435 by Jose Daniel Rivera RN  Outcome: Ongoing  Goal: Free from intentional harm  Description: Free from intentional harm  2/27/2021 0141 by Michaela Abraham LPN  Outcome: Ongoing  2/26/2021 1435 by Jose Daniel Rivera RN  Outcome: Ongoing     Problem: Daily Care:  Goal: Daily care needs are met  Description: Daily care needs are met  2/27/2021 0141 by Michaela Abraham LPN  Outcome: Ongoing  2/26/2021 1435 by Jose Daniel Rivera RN  Outcome: Ongoing     Problem: Pain:  Goal: Patient's pain/discomfort is manageable  Description: Patient's pain/discomfort is manageable  2/27/2021 0141 by Michaela Abraham LPN  Outcome: Ongoing  2/26/2021 1435 by Jose Daniel Rivera RN  Outcome: Ongoing  Goal: Pain level will decrease  Description: Pain level will decrease  2/27/2021 0141 by Michaela Abraham LPN  Outcome: Ongoing  2/26/2021 1435 by Jose Daniel Rivera RN  Outcome: Ongoing  Goal: Control of acute pain

## 2021-02-27 NOTE — PROGRESS NOTES
Occupational Therapy  Facility/Department: [facility] 8 REHAB UNIT  Daily Treatment Note  NAME: Lashell Garcia  : 1945  MRN: [id number]    Date of Service: 2021    Discharge Recommendations:  Home with Home health OT       Assessment   Performance deficits / Impairments: Decreased functional mobility ; Decreased ADL status; Decreased strength;Decreased endurance;Decreased high-level IADLs  Treatment Diagnosis: Left IT hip fx s/p cephalomedullary nail  Activity Tolerance  Activity Tolerance: Patient Tolerated treatment well  Safety Devices  Safety Devices in place: Yes(left with PT)  Type of devices: Left in chair         Patient Diagnosis(es): There were no encounter diagnoses. has a past medical history of Cerebral artery occlusion with cerebral infarction ([facility] [address] and Hypertension. has a past surgical history that includes Femur fracture surgery (Left, 2021). Restrictions  Restrictions/Precautions  Restrictions/Precautions: Fall Risk, Weight Bearing  Required Braces or Orthoses?: No  Lower Extremity Weight Bearing Restrictions  Left Lower Extremity Weight Bearing: Weight Bearing As Tolerated  Subjective   General  Chart Reviewed: Yes  Patient assessed for rehabilitation services?: Yes  General Comment  Comments: Pt stated that he was dizzy with standing. Orthostatic BP- see vital signs      Objective       Balance  Sitting Balance: Supervision  Standing Balance: Stand by assistance  Functional Mobility  Functional - Mobility Device: Rolling Walker  Activity: To/from bathroom  Assist Level: Contact guard assistance  Functional Mobility Comments: and in apartment area  Tub Transfers  Tub - Transfer From: Rolling walker  Tub - Transfer Type: To and From  Tub - Transfer To:  Transfer tub bench  Tub - Technique: Ambulating  Tub Transfers: Minimal assistance  Tub Transfers Comments: with leg      Transfers  Sit to stand: Stand by assistance  Stand to sit: Stand by assistance Transfer Comments: CGA for couch transfer        Plan   Plan  Current Treatment Recommendations: Strengthening, Balance Training, Functional Mobility Training, Patient/Caregiver Education & Training, Equipment Evaluation, Education, & procurement, Safety Education & Training, Self-Care / ADL, Endurance Training       OutComes Score       02/27/21 1000   Toilet Transfer   Assistance Needed Supervision or touching assistance   Comment CGA with BSC over toilet   CARE Score 4       Goals  Short term goals  Time Frame for Short term goals: 1 week  Short term goal 1: Supervision with bathing hygiene. Short term goal 2: Supervision with clothing management/hygiene for toileting. Short term goal 3: Supervision with toilet transfers. Short term goal 4: Supervision with LB dressing using AE. Short term goal 5: Supervision with one-two handed static standing task for 3 minutes. Long term goals  Time Frame for Long term goals : 2 weeks  Long term goal 1: Modified independent with bathing hygiene. Long term goal 2: Modified independent with toileting and toilet transfers. Long term goal 3: Modified independent with overall dressing. Long term goal 4: Patient verbalize DME needs. Patient Goals   Patient goals : Return home independently.        Therapy Time   Individual Concurrent Group Co-treatment   Time In 1000         Time Out 1100         Minutes 60         Timed Code Treatment Minutes: 75 Darek Cottrell OT

## 2021-02-28 LAB
GLUCOSE BLD-MCNC: 101 MG/DL (ref 70–99)
GLUCOSE BLD-MCNC: 118 MG/DL (ref 70–99)
GLUCOSE BLD-MCNC: 121 MG/DL (ref 70–99)
GLUCOSE BLD-MCNC: 122 MG/DL (ref 70–99)
PERFORMED ON: ABNORMAL

## 2021-02-28 PROCEDURE — 6370000000 HC RX 637 (ALT 250 FOR IP): Performed by: PSYCHIATRY & NEUROLOGY

## 2021-02-28 PROCEDURE — 1180000000 HC REHAB R&B

## 2021-02-28 PROCEDURE — 6370000000 HC RX 637 (ALT 250 FOR IP): Performed by: INTERNAL MEDICINE

## 2021-02-28 PROCEDURE — 82947 ASSAY GLUCOSE BLOOD QUANT: CPT

## 2021-02-28 PROCEDURE — 2500000003 HC RX 250 WO HCPCS: Performed by: PSYCHIATRY & NEUROLOGY

## 2021-02-28 PROCEDURE — 6360000002 HC RX W HCPCS: Performed by: INTERNAL MEDICINE

## 2021-02-28 PROCEDURE — 99232 SBSQ HOSP IP/OBS MODERATE 35: CPT | Performed by: PSYCHIATRY & NEUROLOGY

## 2021-02-28 PROCEDURE — 2580000003 HC RX 258: Performed by: INTERNAL MEDICINE

## 2021-02-28 RX ADMIN — THERA TABS 1 TABLET: TAB at 08:13

## 2021-02-28 RX ADMIN — Medication 81 MG: at 08:12

## 2021-02-28 RX ADMIN — ENOXAPARIN SODIUM 40 MG: 100 INJECTION SUBCUTANEOUS at 08:13

## 2021-02-28 RX ADMIN — LISINOPRIL 10 MG: 10 TABLET ORAL at 08:13

## 2021-02-28 RX ADMIN — TRAZODONE HYDROCHLORIDE 50 MG: 50 TABLET ORAL at 21:04

## 2021-02-28 RX ADMIN — ACETAMINOPHEN 650 MG: 325 TABLET ORAL at 15:06

## 2021-02-28 RX ADMIN — SODIUM CHLORIDE, PRESERVATIVE FREE 10 ML: 5 INJECTION INTRAVENOUS at 08:17

## 2021-02-28 RX ADMIN — Medication 5 MG: at 21:04

## 2021-02-28 RX ADMIN — ATORVASTATIN CALCIUM 20 MG: 20 TABLET, FILM COATED ORAL at 21:04

## 2021-02-28 RX ADMIN — FOLIC ACID 1 MG: 1 TABLET ORAL at 08:13

## 2021-02-28 RX ADMIN — MICONAZOLE NITRATE: 2 POWDER TOPICAL at 23:26

## 2021-02-28 RX ADMIN — SODIUM CHLORIDE, PRESERVATIVE FREE 10 ML: 5 INJECTION INTRAVENOUS at 21:05

## 2021-02-28 ASSESSMENT — PAIN DESCRIPTION - PROGRESSION: CLINICAL_PROGRESSION: GRADUALLY IMPROVING

## 2021-02-28 ASSESSMENT — PAIN SCALES - GENERAL
PAINLEVEL_OUTOF10: 5
PAINLEVEL_OUTOF10: 2
PAINLEVEL_OUTOF10: 0

## 2021-02-28 NOTE — PROGRESS NOTES
Patient:   Debra Costello  MR#:    [id number]   Room:    0813/813-01   YOB: 1945  Date of Progress Note: 2/28/2021  Time of Note                           11:55 AM  Consulting Physician:   Anatoliy Paz M.D. Attending Physician:  Anatoliy Paz MD     Chief complaint Acute ischemic stroke/left hip fracture and repair       S:This 67 y.o. male with history of HTN,cerebal artery occlusion, and TIA. He presented to [facility] ER on 2/13/21 after having a fall on the ice, landing on his left hip. X-ray done revealed an acute mildly discplaced closed instratrochanteric left hip fracture. He was admitted to the hospitalist service with a consult for orthopedic surgery. He was seen by Dr. Santina Moritz, who recommended cephalomedullary nailing of his left hip fracture. Patient was in agreement and proceeded to surgery. Patient tolerted the procedure well. He will be weightbearing as tolerated on his left lower extremity and will require DVT prophylaxis x 4 weeks. On the night of the 2/14, patient started having some agitation,confusion,delirium with hallucinations of bugs crawling on the ceiling and combative at times. Urine analysis,BLC and CXR were unremarkable. On the 2/15, he was noted to have a posterior lean when working with therapy. Neurology was consulted on 2/16. MRI done revealed a small brainstem ischemic stroke. His confusion was more likely encephalopathy. Patient was intermittent confusion but was easily redirected, no longer combative or requiring a sitter. Patient continued to have difficulty advancing his left lower extremity. He is participating in both PT/OT. He is felt to need a stay on Rehab to work towards his goal of returning home with his wife. No new complaints.     REVIEW OF SYSTEMS:  Constitutional: No fevers No chills  Neck:No stiffness  Respiratory: No shortness of breath  Cardiovascular: some incisional chest pain No palpitations  Gastrointestinal: No abdominal pain Genitourinary: No Dysuria  Neurological: No headache, no confusion    Past Medical History:      Diagnosis Date    Cerebral artery occlusion with cerebral infarction ([facility] [address]     tia    Hypertension        Past Surgical History:      Procedure Laterality Date    FEMUR FRACTURE SURGERY Left 2/13/2021    FEMUR IM NAIL GILDARDO INSERTION performed by Madalynn Simmonds, MD at [address] [place] [address]       Medications in Hospital:      Current Facility-Administered Medications:     lisinopril (PRINIVIL;ZESTRIL) tablet 10 mg, 10 mg, Oral, Daily, Antonina Mancilla MD, 10 mg at 02/28/21 0813    enoxaparin (LOVENOX) injection 40 mg, 40 mg, Subcutaneous, Daily, Selena Perez MD, 40 mg at 02/28/21 0813    oxyCODONE-acetaminophen (PERCOCET) 5-325 MG per tablet 1 tablet, 1 tablet, Oral, Q6H PRN, Selena Perez MD, 1 tablet at 02/27/21 1037    acetaminophen (TYLENOL) tablet 650 mg, 650 mg, Oral, Q6H PRN **OR** acetaminophen (TYLENOL) suppository 650 mg, 650 mg, Rectal, Q6H PRN, Selena Perez MD    magnesium hydroxide (MILK OF MAGNESIA) 400 MG/5ML suspension 30 mL, 30 mL, Oral, Daily PRN, Selena Perez MD    potassium chloride (KLOR-CON M) extended release tablet 40 mEq, 40 mEq, Oral, PRN **OR** potassium bicarb-citric acid (EFFER-K) effervescent tablet 40 mEq, 40 mEq, Oral, PRN **OR** potassium chloride 10 mEq/100 mL IVPB (Peripheral Line), 10 mEq, Intravenous, PRN, Selena Perez MD    promethazine (PHENERGAN) tablet 12.5 mg, 12.5 mg, Oral, Q6H PRN **OR** ondansetron (ZOFRAN) injection 4 mg, 4 mg, Intravenous, Q6H PRN, Selena Perez MD, 4 mg at 02/23/21 1951    sodium chloride flush 0.9 % injection 10 mL, 10 mL, Intravenous, 2 times per day, Selena Perez MD, 10 mL at 02/28/21 0817    sodium chloride flush 0.9 % injection 10 mL, 10 mL, Intravenous, PRN, Selena Perez MD    dextrose 5 % solution, 100 mL/hr, Intravenous, PRN, Selena Perez MD   dextrose 50 % IV solution, 12.5 g, Intravenous, PRN, Jalen Ricks MD    glucagon (rDNA) injection 1 mg, 1 mg, Intramuscular, PRN, Jalen Ricks MD    glucose (GLUTOSE) 40 % oral gel 15 g, 15 g, Oral, PRN, Jalen Ricks MD    aspirin EC tablet 81 mg, 81 mg, Oral, Daily, Jalen Ricks MD, 81 mg at 02/28/21 9219    atorvastatin (LIPITOR) tablet 20 mg, 20 mg, Oral, Nightly, Jalen Ricks MD, 20 mg at 02/27/21 2156    cyanocobalamin injection 1,000 mcg, 1,000 mcg, Intramuscular, Q7 Days, 1,000 mcg at 02/27/21 0936 **AND** [START ON 5/6/2021] cyanocobalamin injection 1,000 mcg, 1,000 mcg, Intramuscular, Q30 Days, Jalen Ricks MD    folic acid (FOLVITE) tablet 1 mg, 1 mg, Oral, Daily, Jalen Ricks MD, 1 mg at 02/28/21 0813    insulin lispro (HUMALOG) injection vial 0-6 Units, 0-6 Units, Subcutaneous, TID WC, Jalen Ricks MD, Stopped at 02/24/21 1623    insulin lispro (HUMALOG) injection vial 0-3 Units, 0-3 Units, Subcutaneous, Nightly, Jalen Ricks MD    melatonin disintegrating tablet 5 mg, 5 mg, Oral, Nightly, Jalen Ricks MD, 5 mg at 02/27/21 2156    multivitamin 1 tablet, 1 tablet, Oral, Daily, Jalen Ricks MD, 1 tablet at 02/28/21 0813    nicotine (NICODERM CQ) 21 MG/24HR 1 patch, 1 patch, Transdermal, Daily, Jalen Ricks MD, 1 patch at 02/28/21 9656    traZODone (DESYREL) tablet 50 mg, 50 mg, Oral, Nightly PRN, Jalen Ricks MD, 50 mg at 02/24/21 2155    acetaminophen (TYLENOL) tablet 650 mg, 650 mg, Oral, Q4H PRN, Tad Hernández MD, 650 mg at 02/23/21 1459    polyethylene glycol (GLYCOLAX) packet 17 g, 17 g, Oral, Daily PRN, Tad Hernández MD    Allergies:  Patient has no known allergies. Social History:   TOBACCO:   reports that he has been smoking. He has been smoking about 1.00 pack per day. He has never used smokeless tobacco.  ETOH:   reports no history of alcohol use.     Family History: No family history on file. PHYSICAL EXAM:  BP (!) 146/75   Pulse 71   Temp 96.6 °F (35.9 °C) (Temporal)   Resp 16   Ht 6' (1.829 m)   Wt 173 lb 0.3 oz (78.5 kg)   SpO2 91%   BMI 23.47 kg/m²     Constitutional  well developed, well nourished. Eyes  conjunctiva normal.   Ear, nose, throat - No scars, masses, or lesions over external nose or ears, no atrophy of tongue  Neck-symmetric, no masses noted, no jugular vein distension  Respiration- chest wall appears symmetric, good expansion,   normal effort without use of accessory muscles  Musculoskeletal  no significant wasting of muscles noted, no bony deformities  Extremities-no clubbing, cyanosis or edema  Skin  warm, dry, and intact. No rash, erythema, or pallor. Psychiatric  mood, affect, and behavior appear normal.      Neurological exam  Awake, alert, fluent oriented appropriate affect  Attention and concentration appear appropriate  Recent and remote memory appears unremarkable  Speech normal without dysarthria  No clear issues with language of fund of knowledge     Cranial Nerve Exam     CN III, IV,VI-EOMI, No nystagmus, conjugate eye movements, no ptosis    CN VII-no facial assymetry       Motor Exam  antigravity throughout upper and lower extremities bilaterally      Tremors- no tremors in hands or head noted     Gait  Not tested      Nursing/pcp notes, imaging,labs and vitals reviewed.      PT,OT and/or speech notes reviewed    Lab Results   Component Value Date    WBC 10.3 02/25/2021    HGB 8.8 (L) 02/25/2021    HCT 27.6 (L) 02/25/2021    MCV 96.8 (H) 02/25/2021     02/25/2021     Lab Results   Component Value Date     02/25/2021    K 4.2 02/25/2021     02/25/2021    CO2 31 (H) 02/25/2021    BUN 39 (H) 02/25/2021    CREATININE 0.9 02/25/2021    GLUCOSE 106 02/25/2021    CALCIUM 8.6 (L) 02/25/2021    PROT 5.8 (L) 02/25/2021    LABALBU 3.0 (L) 02/25/2021    BILITOT 0.8 02/25/2021    ALKPHOS 104 02/25/2021 Cosigned by: Namita Beltran, PT at 2/25/2021  2:08 PM   Revision History                          RECORD REVIEW: Previous medical records, medications were reviewed at today's visit    IMPRESSION:   1. Acute ischemic stroke-ASA/statin  2. Hyperlipidemia-on statin  3. Vitamin B12 deficiency-on Vitamin B12  4. HTN-on meds monitor   5. Smoker-on nicoderm patch  6. GI-bowel regimen  7. Pain control -Percocet prn  8. S/p left hip surgery-WBAT  9. DVT prophylaxis-Lovenox  10. Encephalopathy-metabolic encephalopathy monitor   11. PT/OT  12.  Insomnia-melatonin    Wants to go home this week  Continue current care      Expected duration and frequency therapy: 180 minutes per day, 5 days per week    Hayden Moore  [phone] CELL  Dr Claude Bologna

## 2021-02-28 NOTE — PLAN OF CARE
Goal: Control of acute pain  Description: Control of acute pain  2/28/2021 1540 by Sofia Whitaker RN  Outcome: Ongoing  2/28/2021 0213 by Jason Nieves LPN  Outcome: Ongoing  Goal: Control of chronic pain  Description: Control of chronic pain  2/28/2021 1540 by Sofia Whitaker RN  Outcome: Ongoing  2/28/2021 0213 by Jason Nieves LPN  Outcome: Ongoing     Problem: Skin Integrity:  Goal: Skin integrity will stabilize  Description: Skin integrity will stabilize  2/28/2021 1540 by Sofia Whitaker RN  Outcome: Ongoing  2/28/2021 0213 by Jason Nieves LPN  Outcome: Ongoing     Problem: Skin Integrity:  Goal: Will show no infection signs and symptoms  Description: Will show no infection signs and symptoms  2/28/2021 1540 by Sofia Whitaker RN  Outcome: Ongoing  2/28/2021 0213 by Jason Nieves LPN  Outcome: Ongoing  Goal: Absence of new skin breakdown  Description: Absence of new skin breakdown  2/28/2021 1540 by Sofia Whitaker RN  Outcome: Ongoing  2/28/2021 0213 by Jason Nieves LPN  Outcome: Ongoing     Problem: Nutrition  Goal: Optimal nutrition therapy  2/28/2021 1540 by Sofia Whitaker RN  Outcome: Ongoing  2/28/2021 0213 by Jason Nieves LPN  Outcome: Ongoing

## 2021-02-28 NOTE — PLAN OF CARE
Problem: Falls - Risk of:  Goal: Will remain free from falls  Description: Will remain free from falls  Outcome: Ongoing  Goal: Absence of physical injury  Description: Absence of physical injury  Outcome: Ongoing     Problem: Mobility - Impaired:  Goal: Mobility will improve  Description: Mobility will improve  Outcome: Ongoing     Problem: Infection:  Goal: Will remain free from infection  Description: Will remain free from infection  Outcome: Ongoing     Problem: Safety:  Goal: Free from accidental physical injury  Description: Free from accidental physical injury  Outcome: Ongoing  Goal: Free from intentional harm  Description: Free from intentional harm  Outcome: Ongoing     Problem: Daily Care:  Goal: Daily care needs are met  Description: Daily care needs are met  Outcome: Ongoing     Problem: Pain:  Goal: Patient's pain/discomfort is manageable  Description: Patient's pain/discomfort is manageable  Outcome: Ongoing  Goal: Pain level will decrease  Description: Pain level will decrease  Outcome: Ongoing  Goal: Control of acute pain  Description: Control of acute pain  Outcome: Ongoing  Goal: Control of chronic pain  Description: Control of chronic pain  Outcome: Ongoing     Problem: Skin Integrity:  Goal: Skin integrity will stabilize  Description: Skin integrity will stabilize  Outcome: Ongoing     Problem: Discharge Planning:  Goal: Patients continuum of care needs are met  Description: Patients continuum of care needs are met  Outcome: Ongoing     Problem: Skin Integrity:  Goal: Will show no infection signs and symptoms  Description: Will show no infection signs and symptoms  Outcome: Ongoing  Goal: Absence of new skin breakdown  Description: Absence of new skin breakdown  Outcome: Ongoing     Problem: Nutrition  Goal: Optimal nutrition therapy  Outcome: Ongoing

## 2021-03-01 LAB
ALBUMIN SERPL-MCNC: 3 G/DL (ref 3.5–5.2)
ALP BLD-CCNC: 105 U/L (ref 40–130)
ALT SERPL-CCNC: 26 U/L (ref 5–41)
ANION GAP SERPL CALCULATED.3IONS-SCNC: 6 MMOL/L (ref 7–19)
AST SERPL-CCNC: 21 U/L (ref 5–40)
BASOPHILS ABSOLUTE: 0 K/UL (ref 0–0.2)
BASOPHILS RELATIVE PERCENT: 0.4 % (ref 0–1)
BILIRUB SERPL-MCNC: 0.5 MG/DL (ref 0.2–1.2)
BUN BLDV-MCNC: 27 MG/DL (ref 8–23)
CALCIUM SERPL-MCNC: 8.7 MG/DL (ref 8.8–10.2)
CHLORIDE BLD-SCNC: 104 MMOL/L (ref 98–111)
CO2: 28 MMOL/L (ref 22–29)
CREAT SERPL-MCNC: 0.8 MG/DL (ref 0.5–1.2)
EOSINOPHILS ABSOLUTE: 0.2 K/UL (ref 0–0.6)
EOSINOPHILS RELATIVE PERCENT: 2.3 % (ref 0–5)
GFR AFRICAN AMERICAN: >59
GFR NON-AFRICAN AMERICAN: >60
GLUCOSE BLD-MCNC: 101 MG/DL (ref 74–109)
GLUCOSE BLD-MCNC: 109 MG/DL (ref 70–99)
GLUCOSE BLD-MCNC: 111 MG/DL (ref 70–99)
GLUCOSE BLD-MCNC: 121 MG/DL (ref 70–99)
GLUCOSE BLD-MCNC: 131 MG/DL (ref 70–99)
HCT VFR BLD CALC: 27.6 % (ref 42–52)
HEMOGLOBIN: 8.8 G/DL (ref 14–18)
IMMATURE GRANULOCYTES #: 0 K/UL
LYMPHOCYTES ABSOLUTE: 1.7 K/UL (ref 1.1–4.5)
LYMPHOCYTES RELATIVE PERCENT: 22.3 % (ref 20–40)
MCH RBC QN AUTO: 31 PG (ref 27–31)
MCHC RBC AUTO-ENTMCNC: 31.9 G/DL (ref 33–37)
MCV RBC AUTO: 97.2 FL (ref 80–94)
MONOCYTES ABSOLUTE: 0.5 K/UL (ref 0–0.9)
MONOCYTES RELATIVE PERCENT: 7 % (ref 0–10)
NEUTROPHILS ABSOLUTE: 5.2 K/UL (ref 1.5–7.5)
NEUTROPHILS RELATIVE PERCENT: 67.5 % (ref 50–65)
PDW BLD-RTO: 12 % (ref 11.5–14.5)
PERFORMED ON: ABNORMAL
PLATELET # BLD: 350 K/UL (ref 130–400)
PMV BLD AUTO: 10.7 FL (ref 9.4–12.4)
POTASSIUM REFLEX MAGNESIUM: 4.3 MMOL/L (ref 3.5–5)
RBC # BLD: 2.84 M/UL (ref 4.7–6.1)
SODIUM BLD-SCNC: 138 MMOL/L (ref 136–145)
TOTAL PROTEIN: 6.1 G/DL (ref 6.6–8.7)
WBC # BLD: 7.7 K/UL (ref 4.8–10.8)

## 2021-03-01 PROCEDURE — 99232 SBSQ HOSP IP/OBS MODERATE 35: CPT | Performed by: PSYCHIATRY & NEUROLOGY

## 2021-03-01 PROCEDURE — 97535 SELF CARE MNGMENT TRAINING: CPT

## 2021-03-01 PROCEDURE — 97110 THERAPEUTIC EXERCISES: CPT

## 2021-03-01 PROCEDURE — 1180000000 HC REHAB R&B

## 2021-03-01 PROCEDURE — 80053 COMPREHEN METABOLIC PANEL: CPT

## 2021-03-01 PROCEDURE — 85025 COMPLETE CBC W/AUTO DIFF WBC: CPT

## 2021-03-01 PROCEDURE — 97530 THERAPEUTIC ACTIVITIES: CPT

## 2021-03-01 PROCEDURE — 6370000000 HC RX 637 (ALT 250 FOR IP): Performed by: INTERNAL MEDICINE

## 2021-03-01 PROCEDURE — 6370000000 HC RX 637 (ALT 250 FOR IP): Performed by: PSYCHIATRY & NEUROLOGY

## 2021-03-01 PROCEDURE — 82947 ASSAY GLUCOSE BLOOD QUANT: CPT

## 2021-03-01 PROCEDURE — 6360000002 HC RX W HCPCS: Performed by: INTERNAL MEDICINE

## 2021-03-01 PROCEDURE — 36415 COLL VENOUS BLD VENIPUNCTURE: CPT

## 2021-03-01 PROCEDURE — 97116 GAIT TRAINING THERAPY: CPT

## 2021-03-01 RX ORDER — POLYETHYLENE GLYCOL 3350 17 G/17G
17 POWDER, FOR SOLUTION ORAL DAILY
Status: DISCONTINUED | OUTPATIENT
Start: 2021-03-01 | End: 2021-03-05 | Stop reason: HOSPADM

## 2021-03-01 RX ADMIN — ENOXAPARIN SODIUM 40 MG: 100 INJECTION SUBCUTANEOUS at 08:14

## 2021-03-01 RX ADMIN — ATORVASTATIN CALCIUM 20 MG: 20 TABLET, FILM COATED ORAL at 21:26

## 2021-03-01 RX ADMIN — MICONAZOLE NITRATE: 2 POWDER TOPICAL at 21:26

## 2021-03-01 RX ADMIN — TRAZODONE HYDROCHLORIDE 50 MG: 50 TABLET ORAL at 21:26

## 2021-03-01 RX ADMIN — Medication 5 MG: at 21:26

## 2021-03-01 RX ADMIN — Medication 81 MG: at 08:14

## 2021-03-01 RX ADMIN — FOLIC ACID 1 MG: 1 TABLET ORAL at 08:14

## 2021-03-01 RX ADMIN — THERA TABS 1 TABLET: TAB at 08:15

## 2021-03-01 RX ADMIN — LISINOPRIL 10 MG: 10 TABLET ORAL at 08:14

## 2021-03-01 ASSESSMENT — PAIN DESCRIPTION - PROGRESSION: CLINICAL_PROGRESSION: GRADUALLY IMPROVING

## 2021-03-01 ASSESSMENT — PAIN SCALES - GENERAL
PAINLEVEL_OUTOF10: 0
PAINLEVEL_OUTOF10: 0

## 2021-03-01 NOTE — PATIENT CARE CONFERENCE
[facility] ACUTE INPATIENT REHABILITATION  TEAM CONFERENCE NOTE    Date: 3/2/2021  Patient Name: Bethany Azul        MRN: [id number]    : 1945  (67 y.o.)  Gender: male      Diagnosis: Cerebral infarction, left body involvement (right brain)      PHYSICAL THERAPY  STRENGTH  Strength RLE  Strength RLE: WNL  Strength LLE  Strength LLE: Exception  Comment: ankle 3+/5, knee and hip grossly 2-/5  ROM  AROM RLE (degrees)  RLE AROM: WNL     BED MOBILITY  Bed Mobility  Rolling: Stand by assistance  Supine to Sit: Stand by assistance(Using Leg  on LLE)  Sit to Supine: Stand by assistance, Modified independent(Uses leg .)  Scooting: Moderate assistance  TRANSFERS  Transfers  Sit to Stand: Stand by assistance  Stand to sit: Stand by assistance  Bed to Chair: Stand by assistance  Car Transfer: Minimal Assistance(LLE in and out)  Comment: Pt. disregarded most VCs dealing with technique, safety. WHEELCHAIR PROPULSION  Propulsion 1  Propulsion: Manual  Level: Level Tile  Method: CHERELLE COE  Level of Assistance: Stand by assistance  Description/ Details: Incorporated turns  Distance: 160'  AMBULATION  Ambulation 1  Surface: level tile  Device: Rolling Walker  Assistance: Contact guard assistance, Stand by assistance  Quality of Gait: Antalgic gait pattern. No LOB. Gait Deviations: Slow Kayleen, Decreased step length, Decreased step height  Distance: 60'x2, 35'  Comments: Incorporated turns. STAIRS  Stairs  # Steps : 4  Rails: Bilateral  Curbs: 4\"  Device: No Device  Assistance: Minimal assistance  Comment: Pt amb up and down stairs using correct technique, but was very unsteady. GOALS:  Short term goals  Time Frame for Short term goals: 1 week  Short term goal 1: Car TF min assist  Short term goal 2: Sit<>Stand contact guard  Short term goal 3: L LE grossly 3/5 on MMT  Short term goal 4: Ambulate 12' x 2 in parallel bars   Short term goal 5: Propel 150' in WC independent.     Long term goals Time Frame for Long term goals : 2-3 weeks  Long term goal 1: Car TF SBA  Long term goal 2: Bed mobility SBA/independent  Long term goal 3: Sit<>Supine SBA/Independent  Long term goal 4: Ambulate 48' in RW with SBA  Long term goal 5: Perform 3 stairs with SBA    ASSESSMENT:  Assessment: Pt continues to perform bed mobility well using Leg  for LLE. Pt has improved to SBA for TF's and amb. Pt tolerated increase in amb distance well w/ minimal increase in L hip pain. SPEECH THERAPY: N/A      OCCUPATIONAL THERAPY    CURRENT IRF-FENG SCORES  Eating: CARE Score: 6       Oral Hygiene: CARE Score: 5        Toileting: CARE Score: 4  Comment: SBA      Shower/Bathe: CARE Score: 4  Comment: with LB sponge, shower        Upper Body Dressing: CARE Score: 6         Lower Body Dressing: CARE Score: 3  Comment: Min A with reacher       Footwear: CARE Score: 3  Comment: Min A with donning socks with sock aide, will need to assess shoes       Toilet Transfers: CARE Score: 4  Comment: SBA       Picking Up Object:  CARE Score: 4          UE Functioning:  WFLs    Pain Assessment:  Pain Level: 4  Pain Location: Hip    STGs:  Short term goals  Time Frame for Short term goals: 1 week  Short term goal 1: MET  Short term goal 2: Supervision with clothing management/hygiene for toileting. Short term goal 3: Supervision with toilet transfers. Short term goal 4: Supervision with LB dressing using AE. Short term goal 5: MET    LTGs:  Long term goals  Time Frame for Long term goals : 2 weeks  Long term goal 1: Modified independent with bathing hygiene. Long term goal 2: Modified independent with toileting and toilet transfers. Long term goal 3: Modified independent with overall dressing.   Long term goal 4: MET    Assessment:  Performance deficits / Impairments: Decreased functional mobility , Decreased ADL status, Decreased strength, Decreased endurance, Decreased high-level IADLs              NUTRITION Current Wt: Weight: 173 lb 0.3 oz (78.5 kg) / Body mass index is 23.47 kg/m². Admission Wt: Admission Body Weight: 195 lb (88.5 kg)  Oral Diet Orders:General  Oral Nutrition Supplement (ONS) Orders: Magic Cup BID    Po intake adequate (%) despite documented wt loss- some fluid related. Will start ONS BID. Please see nutrition notes for further details. NURSING    Wounds/Incisions/Ulcers: Left hip x2 incisions staples/Mepilex. Ольга red. Wyatt Scale Score: 19    Pain: Patient's pain is currently controlled with Tylenol prn. Consultations/Labs/X-rays:     Family Education: Family available and participating in education     Fall Risk:  Huang Score: 72    Fall in the last week?no      Other Nursing Issues: Lovenox. General diet. Accucheks qid. Pills whole. BM 26. Up x1 assist.         SOCIAL WORK/CASE MANAGEMENT  Assessment: Wife is attentive, participated in care, tried to encourage/cajole; cried about his demands. She feels she can handle him at home. 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 Discharge Plan   Estimated Length of Stay: 3/5/21  Destination: home health    Pass: No    Services at Discharge: [address] [employer], Occupational Therapy and Nursing per evaluations    Equipment at Discharge: Will need a RW, additional items to be determined    Progress made in the prior week:  1. CGA for toileting  2. SBA BED MOBILITY  3.  4.  5.      Goals for following week:  1. Supervision for ADLS  2.  INDEP BED MOB  3.   4.   5.     Factors facilitating achievement of predicted outcomes: Family support    Barriers to the achievement of predicted outcomes: Pain, Decreased endurance, Upper extremity weakness and Lower extremity weakness    Team Members Present at Conference:  : [facility]   Occupational Therapist: Jeni Perry OTR/L  Physical Therapist: Viet Talley PT,DPT  Speech Therapist: N/A Nurse: Lety Salazar, RN,BSN   Nurse Manager:  Lety Salazar RN, BSN  Dietitian:  Minor Le, MS, RD, LD  Rehab Director:  Tg Ascencio approve the established interdisciplinary plan of care as documented within the medical record of [address] [place] [address].

## 2021-03-01 NOTE — PROGRESS NOTES
Durable Medical Equipment   Physician Order     Patient Name Cecile Klein  Patient Phone: [phone] [facility])     Patient Address: [facility]   [address] [address]    Patient Height Height: 6' (182.9 cm)  Patient Weight 173 lb 0.3 oz (78.5 kg)   1945     DME NEEDED:  · STANDARD WHEELCHAIR, FULL LENGTH ARM RESTS, ANTI-TIPPERS, B SWING AWAY FOOT RESTS  · STANDARD WHEELCHAIR CUSHION    F/O Payor/Plan Precert #   MEDICARE/MEDICARE PART A AND B    Subscriber Subscriber #   Jade Perico [id number]   Address Phone   [address] [facility] [address] [facility] [address], [place] [address] [phone]         DIAGNOSIS:  Hospital Problem List  Date Reviewed: 2021     ICD-10-CM Priority Class Noted POA    Acute ischemic stroke ([facility] [address] I63.9   2021 Yes   Hip fracture requiring operative repair, left, closed, initial encounter ([facility] [address] S72.002A   2021 Yes   Hyperglycemia R73.9   2021 Yes   Essential hypertension I10   Unknown Yes   Fall from slipping on ice W00. 9XXA   2021 Yes   Altered mental state R41.82   2021 Yes   Smoker F17.200   2021 Yes   Pain in hip M25.559   Unknown Yes   Gait abnormality R26.9   Unknown Yes   Vitamin B12 deficiency E53.8   2021 Yes   Other hyperlipidemia E78.49   2021 Yes   Other insomnia G47.09   2021 Yes     Admitting diagnosis:Cerebral infarction, unspecified     Secondary diagnoses:Essential (primary) hypertension,Hyperglycemia, unspecified,Displaced intertrochanteric fracture of left femur, initial encounter for closed fracture,Nicotine dependence, cigarettes, uncomplicated,Encephalopathy, unspecified     CHIEF COMPLAINT:  Acute ischemic stroke/left hip fracture and repair           HISTORY OF PRESENT ILLNESS:   This 67 y.o. male  with history of HTN,cerebal artery occlusion, and TIA. He presented to [facility] ER on 21 after having a fall on the ice, landing on his left hip.  X-ray done revealed an acute mildly discplaced closed instratrochanteric left hip fracture. He was admitted to the hospitalist service with a consult for orthopedic surgery. He was seen by Dr. Al Olivarez, who recommended cephalomedullary nailing of his left hip fracture. Patient was in agreement and proceeded to surgery. Patient tolerted the procedure well. He will be weightbearing as tolerated on his left lower extremity and will require DVT prophylaxis x 4 weeks. On the night of the 2/14, patient started having some agitation,confusion,delirium with hallucinations of bugs crawling on the ceiling and combative at times. Urine analysis,BLC and CXR were unremarkable. On the 2/15, he was noted to have a posterior lean when working with therapy. Neurology was consulted on 2/16. MRI done revealed a small brainstem ischemic stroke. His confusion was more likely encephalopathy. Patient was intermittent confusion but was easily redirected, no longer combative or requiring a sitter. Patient continued to have difficulty advancing his left lower extremity. He is participating in both PT/OT.  He is felt to need a stay on Rehab to work towards his goal of returning home with his wife.     ____________________ _____________________ ________________   Physician Signature      Physician Name (print)   Physician NPI          Date

## 2021-03-01 NOTE — CARE COORDINATION
Mr. Yosef Murguia always asking to go home as soon as possible - his wife is worn from the constant begging. He has improved- She is to participate in formal family instruction 3/2/21 in a.m. and can plan discharge after that- he may agree to stay a day or two after.

## 2021-03-01 NOTE — PROGRESS NOTES
Nutrition Assessment     Type and Reason for Visit: Reassess    Nutrition Recommendations/Plan: Start ONS BID. Nutrition Assessment:  Noted pt with significant wt loss since admission, some fluid related but also ?wt accuracy considering difference in bed scale versus standing. Pt provided complaints about diet, addressed pt concerns and provided with Magic cup. Will update preferences and send Magic cup BID to promote protein intake. Malnutrition Assessment:  Malnutrition Status: At risk for malnutrition (Comment)    Nutrition Related Findings: LLE non pitting edema      Current Nutrition Therapies:    DIET GENERAL; Anthropometric Measures:  · Height: 6' (182.9 cm)  · Current Body Wt: 173 lb (78.5 kg)   · BMI: 23.5    Nutrition Interventions:   Food and/or Nutrient Delivery:  Continue Current Diet, Start Oral Nutrition Supplement  Nutrition Education/Counseling:  No recommendation at this time   Coordination of Nutrition Care:  Continue to monitor while inpatient    Goals:  Pt will consume 75% or greater of meals.        Nutrition Monitoring and Evaluation:   Food/Nutrient Intake Outcomes:  Food and Nutrient Intake  Physical Signs/Symptoms Outcomes:  Biochemical Data, Nutrition Focused Physical Findings, Skin, Weight, Fluid Status or Edema     Electronically signed by Sravan Diaz, MS, RD, LD on 3/1/21 at 1:32 PM CST    Contact: [phone]

## 2021-03-01 NOTE — PROGRESS NOTES
Occupational Therapy  Facility/Department: [facility] 8 REHAB UNIT  Daily Treatment Note  NAME: Nasra Holbrook  : 1945  MRN: [id number]    Date of Service: 3/1/2021    Discharge Recommendations:  Home with Home health OT       Assessment   Performance deficits / Impairments: Decreased functional mobility ; Decreased ADL status; Decreased strength;Decreased endurance;Decreased high-level IADLs  Treatment Diagnosis: Left IT hip fx s/p cephalomedullary nail  Prognosis: Good  OT Education: Transfer Training  Activity Tolerance  Activity Tolerance: Patient Tolerated treatment well  Safety Devices  Safety Devices in place: Yes  Type of devices: Call light within reach; Chair alarm in place         Patient Diagnosis(es): There were no encounter diagnoses. has a past medical history of Cerebral artery occlusion with cerebral infarction ([facility] [address] and Hypertension. has a past surgical history that includes Femur fracture surgery (Left, 2021).     Restrictions  Restrictions/Precautions  Restrictions/Precautions: Fall Risk, Weight Bearing  Required Braces or Orthoses?: No  Lower Extremity Weight Bearing Restrictions  Left Lower Extremity Weight Bearing: Weight Bearing As Tolerated       Objective             Balance  Sitting Balance: Independent  Standing Balance: Supervision  Functional Mobility  Functional - Mobility Device: Rolling Walker  Activity: To/from bathroom  Assist Level: Stand by assistance     Transfers  Sit to stand: Stand by assistance  Stand to sit: Stand by assistance          Plan   Plan  Current Treatment Recommendations: Strengthening, Balance Training, Functional Mobility Training, Patient/Caregiver Education & Training, Equipment Evaluation, Education, & procurement, Safety Education & Training, Self-Care / ADL, Endurance Training  OutComes Score       21 0815   [address] Needed Supervision or touching assistance   Comment SBA   CARE Score 4   Shower/Bathe Self Assistance Needed Supervision or touching assistance   Comment with LB sponge, shower   CARE Score 4   Upper Body Dressing   Assistance Needed Independent   CARE Score 6   Lower Body Dressing   Assistance Needed Partial/moderate assistance   Comment Min A with reacher   CARE Score 3      03/01/21 0815   Toilet Transfer   Assistance Needed Supervision or touching assistance   Comment SBA   CARE Score 4       Goals  Short term goals  Time Frame for Short term goals: 1 week  Short term goal 1: MET  Short term goal 2: Supervision with clothing management/hygiene for toileting. Short term goal 3: Supervision with toilet transfers. Short term goal 4: Supervision with LB dressing using AE. Short term goal 5: MET  Long term goals  Time Frame for Long term goals : 2 weeks  Long term goal 1: Modified independent with bathing hygiene. Long term goal 2: Modified independent with toileting and toilet transfers. Long term goal 3: Modified independent with overall dressing. Long term goal 4: MET  Patient Goals   Patient goals : Return home independently.        Therapy Time     03/01/21 0815   OT Individual Minutes   Time In 0815   Time Out 0900   Minutes 45   Time Code Minutes    Timed Code Treatment Minutes [facility] [facility],

## 2021-03-01 NOTE — PROGRESS NOTES
03/01/21 0905 03/01/21 0908 03/01/21 0934   Pain Screening   Patient Currently in Pain No  --   --    Bed Mobility   Sit to Supine  --   --   --    Transfers   Sit to Stand Stand by assistance  --   --    Stand to sit Stand by assistance  --   --    Bed to Chair Contact guard assistance;Stand by assistance  (With RW)  --   --    Ambulation   Ambulation?  --  Yes  --    WB Status  --  FWBAT LLE  --    Ambulation 1   Surface  --  level tile  --    Device  --  Rolling Walker  --    Assistance  --  Contact guard assistance;Stand by assistance  --    Quality of Gait  --  Antalgic gait pattern. No LOB. --    Gait Deviations  --  Slow Kayleen;Decreased step length;Decreased step height  --    Distance  --  40' x3  --    Comments  --  Incorporated turns. --    Exercises   Comments  --   --  Sitting BLE ex  x20 reps. A-AAROM with LLE. Conditions Requiring Skilled Therapeutic Intervention   Assessment  --   --   --    Activity Tolerance   Activity Tolerance  --   --   --    Safety Devices   Type of devices  --   --   --       03/01/21 0935 03/01/21 0936 03/01/21 0945   Pain Screening   Patient Currently in Pain  --   --   --    Bed Mobility   Sit to Supine  --   --  Stand by assistance;Modified independent  (Uses leg .)   Transfers   Sit to Stand  --   --   --    Stand to sit  --   --   --    Bed to Chair  --   --   --    Ambulation   Ambulation?  --   --   --    WB Status  --   --   --    Ambulation 1   Surface  --   --   --    Device  --   --   --    Assistance  --   --   --    Quality of Gait  --   --   --    Gait Deviations  --   --   --    Distance  --   --   --    Comments  --   --   --    Exercises   Comments  --   --   --    Conditions Requiring Skilled Therapeutic Intervention   Assessment  --  Able to increase amb distance today.   --    Activity Tolerance   Activity Tolerance Patient limited by endurance  --   --    Safety Devices   Type of devices  --   --   --       03/01/21 0948   Pain Screening Patient Currently in Pain  --    Bed Mobility   Sit to Supine  --    Transfers   Sit to Stand  --    Stand to sit  --    Bed to Chair  --    Ambulation   Ambulation?  --    WB Status  --    Ambulation 1   Surface  --    Device  --    Assistance  --    Quality of Gait  --    Gait Deviations  --    Distance  --    Comments  --    Exercises   Comments  --    Conditions Requiring Skilled Therapeutic Intervention   Assessment  --    Activity Tolerance   Activity Tolerance  --    Safety Devices   Type of devices Bed alarm in place;Call light within reach   Electronically signed by Digna Correia PTA on 3/1/2021 at 9:49 AM

## 2021-03-01 NOTE — PROGRESS NOTES
Patient:   Rufino Tsai  MR#:    [id number]   Room:    0813/813-01   YOB: 1945  Date of Progress Note: 3/1/2021  Time of Note                           9:46 AM  Consulting Physician:   Mariana Munroe M.D. Attending Physician:  Mariana Munroe MD     Chief complaint Acute ischemic stroke/left hip fracture and repair       S:This 67 y.o. male with history of HTN,cerebal artery occlusion, and TIA. He presented to [facility] on 2/13/21 after having a fall on the ice, landing on his left hip. X-ray done revealed an acute mildly discplaced closed instratrochanteric left hip fracture. He was admitted to the hospitalist service with a consult for orthopedic surgery. He was seen by Dr. Jacob Lu, who recommended cephalomedullary nailing of his left hip fracture. Patient was in agreement and proceeded to surgery. Patient tolerted the procedure well. He will be weightbearing as tolerated on his left lower extremity and will require DVT prophylaxis x 4 weeks. On the night of the 2/14, patient started having some agitation,confusion,delirium with hallucinations of bugs crawling on the ceiling and combative at times. Urine analysis,BLC and CXR were unremarkable. On the 2/15, he was noted to have a posterior lean when working with therapy. Neurology was consulted on 2/16. MRI done revealed a small brainstem ischemic stroke. His confusion was more likely encephalopathy. Patient was intermittent confusion but was easily redirected, no longer combative or requiring a sitter. Patient continued to have difficulty advancing his left lower extremity. He is participating in both PT/OT. He is felt to need a stay on Rehab to work towards his goal of returning home with his wife. No new issues.     REVIEW OF SYSTEMS:  Constitutional: No fevers No chills  Neck:No stiffness  Respiratory: No shortness of breath  Cardiovascular: some incisional chest pain No palpitations  Gastrointestinal: No abdominal pain Genitourinary: No Dysuria  Neurological: No headache, no confusion    Past Medical History:      Diagnosis Date    Cerebral artery occlusion with cerebral infarction ([facility] [address]     tia    Hypertension        Past Surgical History:      Procedure Laterality Date    FEMUR FRACTURE SURGERY Left 2/13/2021    FEMUR IM NAIL GILDARDO INSERTION performed by Bharat Sanchez MD at [address] [place] [address]       Medications in Hospital:      Current Facility-Administered Medications:     miconazole (MICOTIN) 2 % powder, , Topical, BID, Miguel Angel Cabral MD, Given at 02/28/21 2326    lisinopril (PRINIVIL;ZESTRIL) tablet 10 mg, 10 mg, Oral, Daily, Miguel Angel Cabral MD, 10 mg at 03/01/21 0814    enoxaparin (LOVENOX) injection 40 mg, 40 mg, Subcutaneous, Daily, Vivienne Benito MD, 40 mg at 03/01/21 0814    oxyCODONE-acetaminophen (PERCOCET) 5-325 MG per tablet 1 tablet, 1 tablet, Oral, Q6H PRN, Vivienne Benito MD, 1 tablet at 02/27/21 1037    acetaminophen (TYLENOL) tablet 650 mg, 650 mg, Oral, Q6H PRN **OR** acetaminophen (TYLENOL) suppository 650 mg, 650 mg, Rectal, Q6H PRN, Vivienne Benito MD    magnesium hydroxide (MILK OF MAGNESIA) 400 MG/5ML suspension 30 mL, 30 mL, Oral, Daily PRN, Vivienne Benito MD    potassium chloride (KLOR-CON M) extended release tablet 40 mEq, 40 mEq, Oral, PRN **OR** potassium bicarb-citric acid (EFFER-K) effervescent tablet 40 mEq, 40 mEq, Oral, PRN **OR** potassium chloride 10 mEq/100 mL IVPB (Peripheral Line), 10 mEq, Intravenous, PRN, Vivienne Benito MD    promethazine (PHENERGAN) tablet 12.5 mg, 12.5 mg, Oral, Q6H PRN **OR** ondansetron (ZOFRAN) injection 4 mg, 4 mg, Intravenous, Q6H PRN, Vivienne Benito MD, 4 mg at 02/23/21 1951    sodium chloride flush 0.9 % injection 10 mL, 10 mL, Intravenous, 2 times per day, Vivienne Benito MD, 10 mL at 02/28/21 2105    sodium chloride flush 0.9 % injection 10 mL, 10 mL, Intravenous, PRN, Vivienne Benito MD   dextrose 5 % solution, 100 mL/hr, Intravenous, PRN, Nadya Jeffrey MD    dextrose 50 % IV solution, 12.5 g, Intravenous, PRN, Nadya Jeffrey MD    glucagon (rDNA) injection 1 mg, 1 mg, Intramuscular, PRN, Nadya Jeffrey MD    glucose (GLUTOSE) 40 % oral gel 15 g, 15 g, Oral, PRN, Nadya Jeffrey MD    aspirin EC tablet 81 mg, 81 mg, Oral, Daily, Nadya Jeffrey MD, 81 mg at 03/01/21 0814    atorvastatin (LIPITOR) tablet 20 mg, 20 mg, Oral, Nightly, Nadya Jeffrey MD, 20 mg at 02/28/21 2104    cyanocobalamin injection 1,000 mcg, 1,000 mcg, Intramuscular, Q7 Days, 1,000 mcg at 02/27/21 0936 **AND** [START ON 5/6/2021] cyanocobalamin injection 1,000 mcg, 1,000 mcg, Intramuscular, Q30 Days, Nadya Jeffrey MD    folic acid (FOLVITE) tablet 1 mg, 1 mg, Oral, Daily, Nadya Jeffrey MD, 1 mg at 03/01/21 0814    insulin lispro (HUMALOG) injection vial 0-6 Units, 0-6 Units, Subcutaneous, TID WC, Nadya Jeffrey MD, Stopped at 02/24/21 1623    insulin lispro (HUMALOG) injection vial 0-3 Units, 0-3 Units, Subcutaneous, Nightly, Nadya Jeffrey MD    melatonin disintegrating tablet 5 mg, 5 mg, Oral, Nightly, Nadya Jeffrey MD, 5 mg at 02/28/21 2104    multivitamin 1 tablet, 1 tablet, Oral, Daily, Nadya Jeffrey MD, 1 tablet at 03/01/21 0815    nicotine (NICODERM CQ) 21 MG/24HR 1 patch, 1 patch, Transdermal, Daily, Nadya Jeffrey MD, 1 patch at 03/01/21 0814    traZODone (DESYREL) tablet 50 mg, 50 mg, Oral, Nightly PRN, Nadya Jeffrey MD, 50 mg at 02/28/21 2104    acetaminophen (TYLENOL) tablet 650 mg, 650 mg, Oral, Q4H PRN, Claude Bologna, MD, 650 mg at 02/28/21 1506    polyethylene glycol (GLYCOLAX) packet 17 g, 17 g, Oral, Daily PRN, Claude Bologna, MD    Allergies:  Patient has no known allergies. Social History:   TOBACCO:   reports that he has been smoking. He has been smoking about 1.00 pack per day.  He has never used smokeless tobacco. ETOH:   reports no history of alcohol use. Family History:   No family history on file. PHYSICAL EXAM:  /80   Pulse 75   Temp 97.1 °F (36.2 °C) (Temporal)   Resp 18   Ht 6' (1.829 m)   Wt 173 lb 0.3 oz (78.5 kg)   SpO2 94%   BMI 23.47 kg/m²     Constitutional  well developed, well nourished. Eyes  conjunctiva normal.   Ear, nose, throat - No scars, masses, or lesions over external nose or ears, no atrophy of tongue  Neck-symmetric, no masses noted, no jugular vein distension  Respiration- chest wall appears symmetric, good expansion,   normal effort without use of accessory muscles  Musculoskeletal  no significant wasting of muscles noted, no bony deformities  Extremities-no clubbing, cyanosis or edema  Skin  warm, dry, and intact. No rash, erythema, or pallor. Psychiatric  mood, affect, and behavior appear normal.      Neurological exam  Awake, alert, fluent oriented appropriate affect  Attention and concentration appear appropriate  Recent and remote memory appears unremarkable  Speech normal without dysarthria  No clear issues with language of fund of knowledge     Cranial Nerve Exam     CN III, IV,VI-EOMI, No nystagmus, conjugate eye movements, no ptosis    CN VII-no facial assymetry       Motor Exam  antigravity throughout upper and lower extremities bilaterally      Tremors- no tremors in hands or head noted     Gait  Not tested      Nursing/pcp notes, imaging,labs and vitals reviewed.      PT,OT and/or speech notes reviewed    Lab Results   Component Value Date    WBC 7.7 03/01/2021    HGB 8.8 (L) 03/01/2021    HCT 27.6 (L) 03/01/2021    MCV 97.2 (H) 03/01/2021     03/01/2021     Lab Results   Component Value Date     03/01/2021    K 4.3 03/01/2021     03/01/2021    CO2 28 03/01/2021    BUN 27 (H) 03/01/2021    CREATININE 0.8 03/01/2021    GLUCOSE 101 03/01/2021    CALCIUM 8.7 (L) 03/01/2021    PROT 6.1 (L) 03/01/2021    LABALBU 3.0 (L) 03/01/2021 BILITOT 0.5 03/01/2021    ALKPHOS 105 03/01/2021    AST 21 03/01/2021    ALT 26 03/01/2021    LABGLOM >60 03/01/2021    GFRAA >59 03/01/2021     Lab Results   Component Value Date    INR 1.11 02/22/2021    INR 1.07 02/13/2021    PROTIME 14.2 02/22/2021    PROTIME 13.9 02/13/2021       Randal Lau PTA   Therapy Assistant   Physical Therapy   Progress Notes   Signed   Date of Service:  2/27/2021  3:06 PM               Signed             Show:Clear all  []Manual[x]Template[]Copied    Added by:  [x]Homar Leal PTA    []Hung for details  [place]als L[place]  [id number]       02/27/21 1502 02/27/21 1504 02/27/21 1505   Subjective   Subjective Pt agreed to therapy this afternoon. --   --    Pain Screening   Patient Currently in Pain Yes  --   --    Pain Assessment   Pain Assessment 0-10  --   --    Pain Level 4  --   --    Patient's Stated Pain Goal No pain  --   --    Pain Type Acute pain  --   --    Pain Location Hip  --   --    Pain Orientation Left  --   --    Pain Descriptors Aching  --   --    Pain Frequency Continuous  --   --    Pain Onset On-going  --   --    Clinical Progression Gradually improving  --   --    Response to Pain Intervention Patient Satisfied  --   --    Multiple Pain Sites No  --   --    Pre Treatment Pain Screening   Pain at present 4  --   --    Scale Used Numeric Score  --   --    Intervention List Patient able to continue with treatment  --   --    Exercises   Comments  --  Supine Noel LE ther ex Hip protocol x 10-15 reps. --    Conditions Requiring Skilled Therapeutic Intervention   Body structures, Functions, Activity limitations  --   --  Decreased functional mobility ; Decreased ADL status; Decreased strength;Decreased endurance   Assessment  --   --  Pt performed Supine Noel LE ther ex Hip protocol this afternoon. Pt tolerated supine ther ex w/ some increase in L hip pain.    Prognosis  --   --  Fair   Activity Tolerance Activity Tolerance  --   --  Patient limited by pain   Electronically signed by Patsy Rios PTA on 2/27/2021 at 3:06 PM               Cosigned by: Hubert Luo, PT at 2/27/2021  3:26 PM   Revision History                          RECORD REVIEW: Previous medical records, medications were reviewed at today's visit    IMPRESSION:   1. Acute ischemic stroke-ASA/statin  2. Hyperlipidemia-on statin  3. Vitamin B12 deficiency-on Vitamin B12  4. HTN-on meds monitor   5. Smoker-on nicoderm patch  6. GI-bowel regimen  7. Pain control -Percocet prn  8. S/p left hip surgery-WBAT  9. DVT prophylaxis-Lovenox  10. Encephalopathy-metabolic encephalopathy monitor   11. PT/OT  12.  Insomnia-melatonin    Wants to go home this week  Continue present care      Expected duration and frequency therapy: 180 minutes per day, 5 days per week    Maikel Mari  [phone] CELL  Dr Aydee Rod

## 2021-03-01 NOTE — PLAN OF CARE
Nutrition Problem #1: Inadequate oral intake  Intervention: Food and/or Nutrient Delivery: Continue Current Diet, Start Oral Nutrition Supplement  Nutritional Goals: Pt will consume 75% or greater of meals.

## 2021-03-02 LAB
GLUCOSE BLD-MCNC: 102 MG/DL (ref 70–99)
GLUCOSE BLD-MCNC: 105 MG/DL (ref 70–99)
GLUCOSE BLD-MCNC: 120 MG/DL (ref 70–99)
GLUCOSE BLD-MCNC: 168 MG/DL (ref 70–99)
PERFORMED ON: ABNORMAL

## 2021-03-02 PROCEDURE — 99233 SBSQ HOSP IP/OBS HIGH 50: CPT | Performed by: PSYCHIATRY & NEUROLOGY

## 2021-03-02 PROCEDURE — 97530 THERAPEUTIC ACTIVITIES: CPT

## 2021-03-02 PROCEDURE — 6370000000 HC RX 637 (ALT 250 FOR IP): Performed by: INTERNAL MEDICINE

## 2021-03-02 PROCEDURE — 82947 ASSAY GLUCOSE BLOOD QUANT: CPT

## 2021-03-02 PROCEDURE — 97116 GAIT TRAINING THERAPY: CPT

## 2021-03-02 PROCEDURE — 6360000002 HC RX W HCPCS: Performed by: INTERNAL MEDICINE

## 2021-03-02 PROCEDURE — 97110 THERAPEUTIC EXERCISES: CPT

## 2021-03-02 PROCEDURE — 6370000000 HC RX 637 (ALT 250 FOR IP): Performed by: PSYCHIATRY & NEUROLOGY

## 2021-03-02 PROCEDURE — 1180000000 HC REHAB R&B

## 2021-03-02 RX ADMIN — ATORVASTATIN CALCIUM 20 MG: 20 TABLET, FILM COATED ORAL at 21:29

## 2021-03-02 RX ADMIN — INSULIN LISPRO 1 UNITS: 100 INJECTION, SOLUTION INTRAVENOUS; SUBCUTANEOUS at 21:30

## 2021-03-02 RX ADMIN — FOLIC ACID 1 MG: 1 TABLET ORAL at 09:14

## 2021-03-02 RX ADMIN — LISINOPRIL 10 MG: 10 TABLET ORAL at 09:14

## 2021-03-02 RX ADMIN — Medication 5 MG: at 21:29

## 2021-03-02 RX ADMIN — ENOXAPARIN SODIUM 40 MG: 100 INJECTION SUBCUTANEOUS at 09:15

## 2021-03-02 RX ADMIN — Medication 81 MG: at 09:14

## 2021-03-02 RX ADMIN — THERA TABS 1 TABLET: TAB at 09:14

## 2021-03-02 RX ADMIN — MICONAZOLE NITRATE: 2 POWDER TOPICAL at 10:01

## 2021-03-02 RX ADMIN — MICONAZOLE NITRATE: 2 POWDER TOPICAL at 21:31

## 2021-03-02 ASSESSMENT — PAIN DESCRIPTION - LOCATION: LOCATION: HIP

## 2021-03-02 ASSESSMENT — PAIN DESCRIPTION - DESCRIPTORS: DESCRIPTORS: DISCOMFORT

## 2021-03-02 ASSESSMENT — PAIN SCALES - GENERAL: PAINLEVEL_OUTOF10: 0

## 2021-03-02 ASSESSMENT — PAIN DESCRIPTION - ORIENTATION: ORIENTATION: LEFT

## 2021-03-02 NOTE — PROGRESS NOTES
03/02/21 1312 03/02/21 1313 03/02/21 1331   Pain Screening   Patient Currently in Pain No  --   --    Bed Mobility   Supine to Sit Modified independent  --   --    Comment Uses leg   --   --    Transfers   Sit to Stand  --  Stand by assistance  --    Stand to sit  --  Stand by assistance  --    Bed to Chair  --  Stand by assistance  (With RW)  --    Ambulation   Ambulation?  --  Yes  --    WB Status  --  FWBAT LLE  --    Ambulation 1   Surface  --  level tile  --    Device  --  Rolling Walker  --    Assistance  --  Contact guard assistance;Stand by assistance  --    Quality of Gait  --  Antalgic gait pattern. No LOB. --    Gait Deviations  --  Slow Kayleen;Decreased step length;Decreased step height  --    Distance  --  79' x2  --    Comments  --  Incorporated turns. --    Conditions Requiring Skilled Therapeutic Intervention   Assessment  --   --  Jazmyn session with rests. Activity Tolerance   Activity Tolerance  --   --  Patient limited by endurance; Patient limited by pain   Safety Devices   Type of devices  --   --   --       03/02/21 1340   Pain Screening   Patient Currently in Pain  --    Bed Mobility   Supine to Sit  --    Comment  --    Transfers   Sit to Stand  --    Stand to sit  --    Bed to Chair  --    Ambulation   Ambulation?  --    WB Status  --    Ambulation 1   Surface  --    Device  --    Assistance  --    Quality of Gait  --    Gait Deviations  --    Distance  --    Comments  --    Conditions Requiring Skilled Therapeutic Intervention   Assessment  --    Activity Tolerance   Activity Tolerance  --    Safety Devices   Type of devices Call light within reach; Chair alarm in place   Electronically signed by Shasta Love PTA on 3/2/2021 at 1:58 PM

## 2021-03-02 NOTE — PROGRESS NOTES
Occupational Therapy     03/02/21 1500   Pain Assessment   Patient Currently in Pain No   Balance   Sitting Balance Independent   Standing Balance Supervision   Standing Balance   Time 8 min   Activity 1 handed static standing task. Functional Mobility   Functional - Mobility Device Rolling Walker   Activity To/from bathroom   Assist Level Supervision   Bed mobility   Supine to Sit Modified independent   Transfers   Stand Step Transfers Stand by assistance   Sit to stand Stand by assistance   Stand to sit Stand by assistance   Tub Transfers   Tub - Transfer From Rolling walker   Tub - Transfer Type To and From   Tub - Transfer To Transfer tub bench   Tub - Technique Ambulating   Tub Transfers Stand by assistance   Tub Transfers Comments utilized leg  to bring LLE into the tub. Wheelchair Bed Transfers   Wheelchair/Bed - Technique Ambulating   Equipment Used Wheelchair;Bed   Level of Asssistance Stand by assistance   Assessment   Performance deficits / Impairments Decreased functional mobility ; Decreased ADL status; Decreased strength;Decreased endurance;Decreased high-level IADLs   Treatment Diagnosis Left IT hip fx s/p cephalomedullary nail   Prognosis Good   Timed Code Treatment Minutes 30 Minutes   Activity Tolerance   Activity Tolerance Patient Tolerated treatment well   Safety Devices   Safety Devices in place Yes   Type of devices Call light within reach; Bed alarm in place   Plan   Current Treatment Recommendations Strengthening;Balance Training;Functional Mobility Training;Patient/Caregiver Education & Training;Equipment Evaluation, Education, & procurement; Safety Education & Training;Self-Care / ADL;Endurance Training

## 2021-03-02 NOTE — PROGRESS NOTES
Occupational Therapy  Facility/Department: [facility] REHAB UNIT  Daily Treatment Note  NAME: Shyla Nugent  : 1945  MRN: [id number]    Date of Service: 3/2/2021    Discharge Recommendations:  Home with Home health OT       Assessment   Performance deficits / Impairments: Decreased functional mobility ; Decreased ADL status; Decreased strength;Decreased endurance;Decreased high-level IADLs  Treatment Diagnosis: Left IT hip fx s/p cephalomedullary nail  Prognosis: Good  OT Education: Transfer Training;ADL Adaptive Strategies  Activity Tolerance  Activity Tolerance: Patient Tolerated treatment well  Safety Devices  Safety Devices in place: Yes  Type of devices: Call light within reach; Bed alarm in place         Patient Diagnosis(es): There were no encounter diagnoses. has a past medical history of Cerebral artery occlusion with cerebral infarction ([facility] [address] and Hypertension. has a past surgical history that includes Femur fracture surgery (Left, 2021). Restrictions  Restrictions/Precautions  Restrictions/Precautions: Fall Risk, Weight Bearing  Required Braces or Orthoses?: No  Lower Extremity Weight Bearing Restrictions  Left Lower Extremity Weight Bearing: Weight Bearing As Tolerated       Objective    Balance  Sitting Balance: Independent  Standing Balance: Supervision  Functional Mobility  Functional - Mobility Device: Rolling Walker  Activity: To/from bathroom; Other  Assist Level: Supervision  Functional Mobility Comments: to therapy gym  Bed mobility  Supine to Sit: Modified independent  Sit to Supine: Modified independent  Comment: leg   Transfers  Sit to stand: Supervision  Stand to sit: Supervision              Plan   Plan  Current Treatment Recommendations: Strengthening, Balance Training, Functional Mobility Training, Self-Care / ADL, Endurance Training         Goals  Short term goals  Time Frame for Short term goals: 1 week  Short term goal 1: MET  Short term goal 2: MET Short term goal 3: MET  Short term goal 4: Supervision with LB dressing using AE. Short term goal 5: MET  Long term goals  Time Frame for Long term goals : 2 weeks  Long term goal 1: Modified independent with bathing hygiene. Long term goal 2: Modified independent with toileting and toilet transfers. Long term goal 3: Modified independent with overall dressing. Long term goal 4: MET  Patient Goals   Patient goals : Return home independently.        Therapy Time     03/02/21 0900   OT Individual Minutes   Time In 0900   Time Out 1000   Minutes 60   Time Code Minutes    Timed Code Treatment Minutes [facility] Ronit, OT

## 2021-03-03 ENCOUNTER — APPOINTMENT (OUTPATIENT)
Dept: GENERAL RADIOLOGY | Age: 76
DRG: 056 | End: 2021-03-03
Attending: PSYCHIATRY & NEUROLOGY
Payer: MEDICARE

## 2021-03-03 LAB
GLUCOSE BLD-MCNC: 102 MG/DL (ref 70–99)
GLUCOSE BLD-MCNC: 108 MG/DL (ref 70–99)
GLUCOSE BLD-MCNC: 134 MG/DL (ref 70–99)
GLUCOSE BLD-MCNC: 138 MG/DL (ref 70–99)
PERFORMED ON: ABNORMAL

## 2021-03-03 PROCEDURE — 97116 GAIT TRAINING THERAPY: CPT

## 2021-03-03 PROCEDURE — 97530 THERAPEUTIC ACTIVITIES: CPT

## 2021-03-03 PROCEDURE — 1180000000 HC REHAB R&B

## 2021-03-03 PROCEDURE — 99232 SBSQ HOSP IP/OBS MODERATE 35: CPT | Performed by: PSYCHIATRY & NEUROLOGY

## 2021-03-03 PROCEDURE — 6360000002 HC RX W HCPCS: Performed by: INTERNAL MEDICINE

## 2021-03-03 PROCEDURE — 6370000000 HC RX 637 (ALT 250 FOR IP): Performed by: INTERNAL MEDICINE

## 2021-03-03 PROCEDURE — 73552 X-RAY EXAM OF FEMUR 2/>: CPT

## 2021-03-03 PROCEDURE — 97110 THERAPEUTIC EXERCISES: CPT

## 2021-03-03 PROCEDURE — 82947 ASSAY GLUCOSE BLOOD QUANT: CPT

## 2021-03-03 PROCEDURE — 6370000000 HC RX 637 (ALT 250 FOR IP): Performed by: PSYCHIATRY & NEUROLOGY

## 2021-03-03 RX ADMIN — Medication 81 MG: at 08:25

## 2021-03-03 RX ADMIN — LISINOPRIL 10 MG: 10 TABLET ORAL at 08:25

## 2021-03-03 RX ADMIN — ATORVASTATIN CALCIUM 20 MG: 20 TABLET, FILM COATED ORAL at 20:44

## 2021-03-03 RX ADMIN — MAGNESIUM HYDROXIDE 30 ML: 400 SUSPENSION ORAL at 20:44

## 2021-03-03 RX ADMIN — ENOXAPARIN SODIUM 40 MG: 100 INJECTION SUBCUTANEOUS at 08:25

## 2021-03-03 RX ADMIN — THERA TABS 1 TABLET: TAB at 08:25

## 2021-03-03 RX ADMIN — FOLIC ACID 1 MG: 1 TABLET ORAL at 08:25

## 2021-03-03 RX ADMIN — POLYETHYLENE GLYCOL 3350 17 G: 17 POWDER, FOR SOLUTION ORAL at 08:35

## 2021-03-03 RX ADMIN — MICONAZOLE NITRATE: 2 POWDER TOPICAL at 08:27

## 2021-03-03 ASSESSMENT — PAIN DESCRIPTION - LOCATION
LOCATION: HIP
LOCATION: HIP

## 2021-03-03 ASSESSMENT — PAIN DESCRIPTION - PROGRESSION: CLINICAL_PROGRESSION: NOT CHANGED

## 2021-03-03 ASSESSMENT — PAIN DESCRIPTION - ORIENTATION
ORIENTATION: LEFT
ORIENTATION: LEFT

## 2021-03-03 ASSESSMENT — PAIN DESCRIPTION - PAIN TYPE: TYPE: ACUTE PAIN

## 2021-03-03 ASSESSMENT — PAIN - FUNCTIONAL ASSESSMENT: PAIN_FUNCTIONAL_ASSESSMENT: ACTIVITIES ARE NOT PREVENTED

## 2021-03-03 ASSESSMENT — PAIN DESCRIPTION - DESCRIPTORS
DESCRIPTORS: DISCOMFORT;SORE
DESCRIPTORS: DISCOMFORT;SORE

## 2021-03-03 ASSESSMENT — PAIN DESCRIPTION - ONSET
ONSET: ON-GOING
ONSET: ON-GOING

## 2021-03-03 NOTE — PLAN OF CARE
Problem: Falls - Risk of:  Goal: Will remain free from falls  Description: Will remain free from falls  3/2/2021 2325 by Odessa Jeans, RN  Outcome: Ongoing  3/2/2021 1201 by Shanta Montalvo RN  Outcome: Ongoing  Goal: Absence of physical injury  Description: Absence of physical injury  3/2/2021 2325 by Odessa Jeans, RN  Outcome: Ongoing  3/2/2021 1201 by Shanta Montalvo RN  Outcome: Ongoing     Problem: Infection:  Goal: Will remain free from infection  Description: Will remain free from infection  3/2/2021 2325 by Odessa Jeans, RN  Outcome: Ongoing  3/2/2021 1201 by Shanta Montalvo RN  Outcome: Ongoing     Problem: Safety:  Goal: Free from accidental physical injury  Description: Free from accidental physical injury  3/2/2021 2325 by Odessa Jeans, RN  Outcome: Ongoing  3/2/2021 1201 by Shanta Montalvo RN  Outcome: Ongoing  Goal: Free from intentional harm  Description: Free from intentional harm  3/2/2021 2325 by Odessa Jeans, RN  Outcome: Ongoing  3/2/2021 1201 by Shanta Montalvo RN  Outcome: Ongoing     Problem: Pain:  Goal: Patient's pain/discomfort is manageable  Description: Patient's pain/discomfort is manageable  3/2/2021 2325 by Odessa Jeans, RN  Outcome: Ongoing  3/2/2021 1201 by Shanta Montalvo RN  Outcome: Ongoing  Goal: Pain level will decrease  Description: Pain level will decrease  3/2/2021 2325 by Odessa Jeans, RN  Outcome: Ongoing  3/2/2021 1201 by Shanta Montalvo RN  Outcome: Ongoing  Goal: Control of acute pain  Description: Control of acute pain  3/2/2021 2325 by Odessa Jeans, RN  Outcome: Ongoing  3/2/2021 1201 by Shanta Montalvo RN  Outcome: Ongoing  Goal: Control of chronic pain  Description: Control of chronic pain  3/2/2021 2325 by Odessa Jeans, RN  Outcome: Ongoing  3/2/2021 1201 by Shanta Montalvo RN  Outcome: Ongoing

## 2021-03-03 NOTE — PROGRESS NOTES
Occupational Therapy     03/03/21 1430   Pre Treatment Pain Screening   Intervention List Patient able to continue with treatment   Pain Assessment   Patient Currently in Pain Yes   Pain Assessment 0-10   Pain Level 9   Pain Type Acute pain   Pain Location Hip   Pain Orientation Left   Pain Descriptors Discomfort; Sore   Pain Frequency Intermittent   Clinical Progression Not changed   Response to Pain Intervention Patient Satisfied   Balance   Sitting Balance Independent   Standing Balance Supervision   Functional Mobility   Functional - Mobility Device Rolling Walker   Activity Other   Assist Level Supervision   Bed mobility   Supine to Sit Modified independent   Comment Utilizes leg  PRN. Transfers   Sit to stand Supervision   Stand to sit Supervision   Type of ROM/Therapeutic Exercise   Type of ROM/Therapeutic Exercise Free weights   Comment 2# 1 set x 20 reps, all planes. Assessment   Performance deficits / Impairments Decreased functional mobility ; Decreased ADL status; Decreased strength;Decreased endurance;Decreased high-level IADLs   Treatment Diagnosis Left IT hip fx s/p cephalomedullary nail   Prognosis Good   Timed Code Treatment Minutes 45 Minutes   Activity Tolerance   Activity Tolerance Patient Tolerated treatment well   Safety Devices   Safety Devices in place Yes   Type of devices Gait belt  (Given to PT.)   Plan   Current Treatment Recommendations Strengthening;Balance Training;Functional Mobility Training;Self-Care / ADL;Endurance Training

## 2021-03-03 NOTE — PROGRESS NOTES
03/03/21 0915 03/03/21 0917 03/03/21 0937   Pain Screening   Patient Currently in Pain No  --   --    Bed Mobility   Supine to Sit Modified independent  --   --    Scooting Modified independent  --   --    Comment Uses leg   --   --    Transfers   Sit to Stand  --  Stand by assistance  --    Stand to sit  --  Stand by assistance  --    Bed to Chair  --  Stand by assistance  (With RW)  --    Ambulation   Ambulation?  --  Yes  --    WB Status  --  FWBAT LLE  --    Ambulation 1   Surface  --  level tile  --    Device  --  Rolling Walker  --    Assistance  --  Contact guard assistance;Stand by assistance  --    Quality of Gait  --  Mild antalgic gait pattern. No LOB. --    Distance  --  80' x2  --    Comments  --  Incorporated turns. --    Exercises   Comments  --   --  Sitting RLE ex  x20 reps. A-AAROM with LLE. Conditions Requiring Skilled Therapeutic Intervention   Assessment  --   --   --    Activity Tolerance   Activity Tolerance  --   --   --    Safety Devices   Type of devices  --   --   --       03/03/21 0942 03/03/21 0959   Pain Screening   Patient Currently in Pain  --   --    Bed Mobility   Supine to Sit  --   --    Scooting  --   --    Comment  --   --    Transfers   Sit to Stand  --   --    Stand to sit  --   --    Bed to Chair  --   --    Ambulation   Ambulation?  --   --    WB Status  --   --    Ambulation 1   Surface  --   --    Device  --   --    Assistance  --   --    Quality of Gait  --   --    Distance  --   --    Comments  --   --    Exercises   Comments  --   --    Conditions Requiring Skilled Therapeutic Intervention   Assessment Jazmyn session with rests. --    Activity Tolerance   Activity Tolerance Patient limited by endurance; Patient limited by fatigue  --    Safety Devices   Type of devices  --  Call light within reach; Chair alarm in place   Electronically signed by Tegan Sun PTA on 3/3/2021 at 10:00 AM

## 2021-03-03 NOTE — PROGRESS NOTES
Patient:   Marck Damon  MR#:    [id number]   Room:    0813/813-01   YOB: 1945  Date of Progress Note: 3/3/2021  Time of Note                           8:53 AM  Consulting Physician:   Ankit Flowers M.D. Attending Physician:  Ankit Flowers MD     Chief complaint Acute ischemic stroke/left hip fracture and repair       S:This 67 y.o. male with history of HTN,cerebal artery occlusion, and TIA. He presented to [facility] ER on 2/13/21 after having a fall on the ice, landing on his left hip. X-ray done revealed an acute mildly discplaced closed instratrochanteric left hip fracture. He was admitted to the hospitalist service with a consult for orthopedic surgery. He was seen by Dr. Jody Key, who recommended cephalomedullary nailing of his left hip fracture. Patient was in agreement and proceeded to surgery. Patient tolerted the procedure well. He will be weightbearing as tolerated on his left lower extremity and will require DVT prophylaxis x 4 weeks. On the night of the 2/14, patient started having some agitation,confusion,delirium with hallucinations of bugs crawling on the ceiling and combative at times. Urine analysis,BLC and CXR were unremarkable. On the 2/15, he was noted to have a posterior lean when working with therapy. Neurology was consulted on 2/16. MRI done revealed a small brainstem ischemic stroke. His confusion was more likely encephalopathy. Patient was intermittent confusion but was easily redirected, no longer combative or requiring a sitter. Patient continued to have difficulty advancing his left lower extremity. He is participating in both PT/OT. He is felt to need a stay on Rehab to work towards his goal of returning home with his wife. No new issues.     REVIEW OF SYSTEMS:  Constitutional: No fevers No chills  Neck:No stiffness  Respiratory: No shortness of breath  Cardiovascular: some incisional chest pain No palpitations  Gastrointestinal: No abdominal pain Genitourinary: No Dysuria  Neurological: No headache, no confusion    Past Medical History:      Diagnosis Date    Cerebral artery occlusion with cerebral infarction ([facility] [address]     tia    Hypertension        Past Surgical History:      Procedure Laterality Date    FEMUR FRACTURE SURGERY Left 2/13/2021    FEMUR IM NAIL GILDARDO INSERTION performed by Fatimah Meneses MD at [address] [place] [address]       Medications in Hospital:      Current Facility-Administered Medications:     polyethylene glycol (GLYCOLAX) packet 17 g, 17 g, Oral, Daily, Chris Cota MD, 17 g at 03/03/21 0835    miconazole (MICOTIN) 2 % powder, , Topical, BID, Chris Cota MD, Given at 03/03/21 0827    lisinopril (PRINIVIL;ZESTRIL) tablet 10 mg, 10 mg, Oral, Daily, Chris Cota MD, 10 mg at 03/03/21 0825    enoxaparin (LOVENOX) injection 40 mg, 40 mg, Subcutaneous, Daily, Natanael Taveras MD, 40 mg at 03/03/21 0825    oxyCODONE-acetaminophen (PERCOCET) 5-325 MG per tablet 1 tablet, 1 tablet, Oral, Q6H PRN, Natanael Taveras MD, 1 tablet at 02/27/21 1037    acetaminophen (TYLENOL) tablet 650 mg, 650 mg, Oral, Q6H PRN **OR** acetaminophen (TYLENOL) suppository 650 mg, 650 mg, Rectal, Q6H PRN, Natanael Taveras MD    magnesium hydroxide (MILK OF MAGNESIA) 400 MG/5ML suspension 30 mL, 30 mL, Oral, Daily PRN, Natanael Taveras MD    potassium chloride (KLOR-CON M) extended release tablet 40 mEq, 40 mEq, Oral, PRN **OR** potassium bicarb-citric acid (EFFER-K) effervescent tablet 40 mEq, 40 mEq, Oral, PRN **OR** potassium chloride 10 mEq/100 mL IVPB (Peripheral Line), 10 mEq, Intravenous, PRN, Natanael Taveras MD    promethazine (PHENERGAN) tablet 12.5 mg, 12.5 mg, Oral, Q6H PRN **OR** ondansetron (ZOFRAN) injection 4 mg, 4 mg, Intravenous, Q6H PRN, Natanael Taveras MD, 4 mg at 02/23/21 1951    sodium chloride flush 0.9 % injection 10 mL, 10 mL, Intravenous, PRN, Natanael MD Darcy No family history on file. PHYSICAL EXAM:  /74   Pulse 71   Temp 97 °F (36.1 °C) (Temporal)   Resp 16   Ht 6' (1.829 m)   Wt 173 lb 0.3 oz (78.5 kg)   SpO2 90%   BMI 23.47 kg/m²     Constitutional  well developed, well nourished. Eyes  conjunctiva normal.   Ear, nose, throat - No scars, masses, or lesions over external nose or ears, no atrophy of tongue  Neck-symmetric, no masses noted, no jugular vein distension  Respiration- chest wall appears symmetric, good expansion,   normal effort without use of accessory muscles  Musculoskeletal  no significant wasting of muscles noted, no bony deformities  Extremities-no clubbing, cyanosis or edema  Skin  warm, dry, and intact. No rash, erythema, or pallor. Psychiatric  mood, affect, and behavior appear normal.      Neurological exam  Awake, alert, fluent oriented appropriate affect  Attention and concentration appear appropriate  Recent and remote memory appears unremarkable  Speech normal without dysarthria  No clear issues with language of fund of knowledge     Cranial Nerve Exam     CN III, IV,VI-EOMI, No nystagmus, conjugate eye movements, no ptosis    CN VII-no facial assymetry       Motor Exam  antigravity throughout upper and lower extremities bilaterally      Tremors- no tremors in hands or head noted     Gait  Not tested      Nursing/pcp notes, imaging,labs and vitals reviewed.      PT,OT and/or speech notes reviewed    Lab Results   Component Value Date    WBC 7.7 03/01/2021    HGB 8.8 (L) 03/01/2021    HCT 27.6 (L) 03/01/2021    MCV 97.2 (H) 03/01/2021     03/01/2021     Lab Results   Component Value Date     03/01/2021    K 4.3 03/01/2021     03/01/2021    CO2 28 03/01/2021    BUN 27 (H) 03/01/2021    CREATININE 0.8 03/01/2021    GLUCOSE 101 03/01/2021    CALCIUM 8.7 (L) 03/01/2021    PROT 6.1 (L) 03/01/2021    LABALBU 3.0 (L) 03/01/2021    BILITOT 0.5 03/01/2021    ALKPHOS 105 03/01/2021    AST 21 03/01/2021 ALT 26 03/01/2021    LABGLOM >60 03/01/2021    GFRAA >59 03/01/2021     Lab Results   Component Value Date    INR 1.11 02/22/2021    INR 1.07 02/13/2021    PROTIME 14.2 02/22/2021    PROTIME 13.9 02/13/2021     Oleg Adrian PTA   Physical Therapist Assistant   Specialty:  Physical Therapy   Progress Notes   Signed   Date of Service:  3/2/2021  1:57 PM               Signed             Show:Clear all  []Manual[x]Template[]Copied    Added by:  [x]Sherry Damon PTA    []Hung for details       03/02/21 1312 03/02/21 1313 03/02/21 1331   Pain Screening   Patient Currently in Pain No  --   --    Bed Mobility   Supine to Sit Modified independent  --   --    Comment Uses leg   --   --    Transfers   Sit to Stand  --  Stand by assistance  --    Stand to sit  --  Stand by assistance  --    Bed to Chair  --  Stand by assistance  (With RW)  --    Ambulation   Ambulation?  --  Yes  --    WB Status  --  FWBAT LLE  --    Ambulation 1   Surface  --  level tile  --    Device  --  Rolling Walker  --    Assistance  --  Contact guard assistance;Stand by assistance  --    Quality of Gait  --  Antalgic gait pattern. No LOB. --    Gait Deviations  --  Slow Kayleen;Decreased step length;Decreased step height  --    Distance  --  79' x2  --    Comments  --  Incorporated turns. --    Conditions Requiring Skilled Therapeutic Intervention   Assessment  --   --  Jazmyn session with rests. Activity Tolerance   Activity Tolerance  --   --  Patient limited by endurance; Patient limited by pain   Safety Devices   Type of devices  --   --   --         03/02/21 1340   Pain Screening   Patient Currently in Pain  --    Bed Mobility   Supine to Sit  --    Comment  --    Transfers   Sit to Stand  --    Stand to sit  --    Bed to Chair  --    Ambulation   Ambulation?  --    WB Status  --    Ambulation 1   Surface  --    Device  --    Assistance  --    Quality of Gait  --    Gait Deviations  --    Distance  --    Comments  -- Conditions Requiring Skilled Therapeutic Intervention   Assessment  --    Activity Tolerance   Activity Tolerance  --    Safety Devices   Type of devices Call light within reach; Chair alarm in place   Electronically signed by Svitlana Melgar PTA on 3/2/2021 at 1:58 PM            Cosigned by: Greg Hernandez PT at 3/2/2021  3:08 PM   Revision History                        RECORD REVIEW: Previous medical records, medications were reviewed at today's visit    IMPRESSION:   1. Acute ischemic stroke-ASA/statin  2. Hyperlipidemia-on statin  3. Vitamin B12 deficiency-on Vitamin B12  4. HTN-on meds monitor   5. Smoker-on nicoderm patch  6. GI-bowel regimen  7. Pain control -Percocet prn  8. S/p left hip surgery-WBAT  9. DVT prophylaxis-Lovenox  10. Encephalopathy-metabolic encephalopathy monitor   11. PT/OT  12.  Insomnia-melatonin    Continue current care    ELOS Friday    Expected duration and frequency therapy: 180 minutes per day, 5 days per week    Esperanza Astudillo  [phone] CELL  Dr Anatoliy Paz

## 2021-03-03 NOTE — PROGRESS NOTES
Occupational Therapy     03/03/21 1100   Pre Treatment Pain Screening   Intervention List Patient able to continue with treatment   Pain Assessment   Patient Currently in Pain Yes   Pain Assessment 0-10   Pain Level 8   Pain Type Acute pain   Pain Location Hip   Pain Orientation Left   Pain Descriptors Discomfort; Sore   Pain Frequency Intermittent   Clinical Progression Not changed   Response to Pain Intervention Patient Satisfied   Balance   Sitting Balance Independent   Standing Balance Supervision   Functional Mobility   Functional - Mobility Device Rolling Walker   Activity Retrieve items;Transport items; Other   Assist Level Supervision   Functional Mobility Comments Light homemaking task in ADL apartment. Bed mobility   Supine to Sit Modified independent   Sit to Supine Modified independent   Transfers   Sit to stand Supervision   Stand to sit Supervision   Type of ROM/Therapeutic Exercise   Type of ROM/Therapeutic Exercise Brijesh   Comment 15# BUE 3 sets x 20 reps. Assessment   Performance deficits / Impairments Decreased functional mobility ; Decreased ADL status; Decreased strength;Decreased endurance;Decreased high-level IADLs   Treatment Diagnosis Left IT hip fx s/p cephalomedullary nail   Prognosis Good   Timed Code Treatment Minutes 50 Minutes   Activity Tolerance   Activity Tolerance Patient Tolerated treatment well   Safety Devices   Safety Devices in place Yes   Type of devices Call light within reach; Bed alarm in place   Plan   Current Treatment Recommendations Strengthening;Balance Training;Functional Mobility Training;Self-Care / ADL;Endurance Training

## 2021-03-03 NOTE — PROGRESS NOTES
03/03/21 1516 03/03/21 1522 03/03/21 1533   Pain Screening   Patient Currently in Pain Yes  --   --    Pain Assessment   Pain Assessment 0-10  --   --    Pain Level 10  --   --    Patient's Stated Pain Goal No pain  --   --    Pain Location Hip  --   --    Pain Orientation Left  --   --    Pain Descriptors Discomfort; Sore  --   --    Functional Pain Assessment Activities are not prevented  --   --    Non-Pharmaceutical Pain Intervention(s) Distraction  --   --    Bed Mobility   Sit to Supine  --   --   --    Scooting  --   --   --    Comment  --   --   --    Transfers   Sit to Stand  --  Stand by assistance; Independent  --    Stand to sit  --  Stand by assistance; Independent  --    Bed to Chair  --  Stand by assistance  (With RW)  --    Ambulation   Ambulation?  --  Yes  --    WB Status  --  FWBAT LLE  --    Ambulation 1   Surface  --  level tile  --    Device  --  Rolling Walker  --    Assistance  --  Contact guard assistance;Stand by assistance  --    Quality of Gait  --  Mild antalgic gait pattern. No LOB. --    Gait Deviations  --  Slow Kayleen;Decreased step length;Decreased step height  --    Distance  --  80' x2  --    Comments  --  Incorporated turns. --    Exercises   Comments  --   --  Sitting RLE ex  x15 reps. A-AAROM with LLE. Conditions Requiring Skilled Therapeutic Intervention   Assessment  --   --  Jazmyn session with rests. Activity Tolerance   Activity Tolerance  --   --  Patient limited by endurance; Patient Tolerated treatment well   Safety Devices   Type of devices  --   --   --       03/03/21 1545 03/03/21 1548   Pain Screening   Patient Currently in Pain  --   --    Pain Assessment   Pain Assessment  --   --    Pain Level  --   --    Patient's Stated Pain Goal  --   --    Pain Location  --   --    Pain Orientation  --   --    Pain Descriptors  --   --    Functional Pain Assessment  --   --    Non-Pharmaceutical Pain Intervention(s)  --   --    Bed Mobility   Sit to Supine Modified independent  --    Scooting Modified independent  --    Comment Uses leg   --    Transfers   Sit to Stand  --   --    Stand to sit  --   --    Bed to Chair  --   --    Ambulation   Ambulation?  --   --    WB Status  --   --    Ambulation 1   Surface  --   --    Device  --   --    Assistance  --   --    Quality of Gait  --   --    Gait Deviations  --   --    Distance  --   --    Comments  --   --    Exercises   Comments  --   --    Conditions Requiring Skilled Therapeutic Intervention   Assessment  --   --    Activity Tolerance   Activity Tolerance  --   --    Safety Devices   Type of devices  --  Bed alarm in place;Call light within reach   Electronically signed by Ritika Penn PTA on 3/3/2021 at 3:49 PM

## 2021-03-03 NOTE — PLAN OF CARE
Problem: Falls - Risk of:  Goal: Will remain free from falls  Description: Will remain free from falls  3/3/2021 1116 by Lacie Burton RN  Outcome: Ongoing  3/2/2021 2325 by Emily Back RN  Outcome: Ongoing  Goal: Absence of physical injury  Description: Absence of physical injury  3/3/2021 1116 by Lacie Burton RN  Outcome: Ongoing  3/2/2021 2325 by Emily Back RN  Outcome: Ongoing

## 2021-03-04 LAB
GLUCOSE BLD-MCNC: 102 MG/DL (ref 70–99)
GLUCOSE BLD-MCNC: 109 MG/DL (ref 70–99)
GLUCOSE BLD-MCNC: 132 MG/DL (ref 70–99)
PERFORMED ON: ABNORMAL

## 2021-03-04 PROCEDURE — 97110 THERAPEUTIC EXERCISES: CPT

## 2021-03-04 PROCEDURE — 6370000000 HC RX 637 (ALT 250 FOR IP): Performed by: INTERNAL MEDICINE

## 2021-03-04 PROCEDURE — 1180000000 HC REHAB R&B

## 2021-03-04 PROCEDURE — 97535 SELF CARE MNGMENT TRAINING: CPT

## 2021-03-04 PROCEDURE — 97116 GAIT TRAINING THERAPY: CPT

## 2021-03-04 PROCEDURE — 97530 THERAPEUTIC ACTIVITIES: CPT

## 2021-03-04 PROCEDURE — 6360000002 HC RX W HCPCS: Performed by: INTERNAL MEDICINE

## 2021-03-04 PROCEDURE — 6370000000 HC RX 637 (ALT 250 FOR IP): Performed by: PSYCHIATRY & NEUROLOGY

## 2021-03-04 PROCEDURE — 99232 SBSQ HOSP IP/OBS MODERATE 35: CPT | Performed by: PSYCHIATRY & NEUROLOGY

## 2021-03-04 PROCEDURE — 82947 ASSAY GLUCOSE BLOOD QUANT: CPT

## 2021-03-04 RX ORDER — NICOTINE 21 MG/24HR
1 PATCH, TRANSDERMAL 24 HOURS TRANSDERMAL DAILY
Qty: 30 PATCH | Refills: 0 | Status: SHIPPED | OUTPATIENT
Start: 2021-03-05

## 2021-03-04 RX ORDER — LISINOPRIL 10 MG/1
10 TABLET ORAL DAILY
Qty: 30 TABLET | Refills: 0 | Status: SHIPPED | OUTPATIENT
Start: 2021-03-05

## 2021-03-04 RX ORDER — MECOBALAMIN 5000 MCG
5 TABLET,DISINTEGRATING ORAL NIGHTLY
Qty: 30 TABLET | Refills: 0 | Status: SHIPPED | OUTPATIENT
Start: 2021-03-04 | End: 2021-04-03

## 2021-03-04 RX ORDER — ATORVASTATIN CALCIUM 20 MG/1
20 TABLET, FILM COATED ORAL NIGHTLY
Qty: 30 TABLET | Refills: 0 | Status: SHIPPED | OUTPATIENT
Start: 2021-03-04 | End: 2021-04-03

## 2021-03-04 RX ORDER — CYANOCOBALAMIN 1000 UG/ML
1000 INJECTION INTRAMUSCULAR; SUBCUTANEOUS
Qty: 1 VIAL | Refills: 0 | Status: SHIPPED | OUTPATIENT
Start: 2021-05-06

## 2021-03-04 RX ORDER — ASPIRIN 325 MG
325 TABLET, DELAYED RELEASE (ENTERIC COATED) ORAL DAILY
Qty: 30 TABLET | Refills: 0 | Status: SHIPPED | OUTPATIENT
Start: 2021-03-04 | End: 2022-03-04

## 2021-03-04 RX ADMIN — ENOXAPARIN SODIUM 40 MG: 100 INJECTION SUBCUTANEOUS at 08:21

## 2021-03-04 RX ADMIN — POLYETHYLENE GLYCOL 3350 17 G: 17 POWDER, FOR SOLUTION ORAL at 08:17

## 2021-03-04 RX ADMIN — TRAZODONE HYDROCHLORIDE 50 MG: 50 TABLET ORAL at 20:16

## 2021-03-04 RX ADMIN — FOLIC ACID 1 MG: 1 TABLET ORAL at 08:21

## 2021-03-04 RX ADMIN — THERA TABS 1 TABLET: TAB at 08:21

## 2021-03-04 RX ADMIN — Medication 5 MG: at 20:16

## 2021-03-04 RX ADMIN — LISINOPRIL 10 MG: 10 TABLET ORAL at 08:21

## 2021-03-04 RX ADMIN — OXYCODONE HYDROCHLORIDE AND ACETAMINOPHEN 1 TABLET: 5; 325 TABLET ORAL at 20:16

## 2021-03-04 RX ADMIN — ATORVASTATIN CALCIUM 20 MG: 20 TABLET, FILM COATED ORAL at 20:16

## 2021-03-04 RX ADMIN — MICONAZOLE NITRATE: 2 POWDER TOPICAL at 08:29

## 2021-03-04 RX ADMIN — Medication 81 MG: at 08:21

## 2021-03-04 ASSESSMENT — PAIN DESCRIPTION - LOCATION
LOCATION: HIP

## 2021-03-04 ASSESSMENT — PAIN DESCRIPTION - ONSET
ONSET: ON-GOING
ONSET: ON-GOING

## 2021-03-04 ASSESSMENT — PAIN DESCRIPTION - FREQUENCY: FREQUENCY: INTERMITTENT

## 2021-03-04 ASSESSMENT — PAIN SCALES - GENERAL
PAINLEVEL_OUTOF10: 0
PAINLEVEL_OUTOF10: 3
PAINLEVEL_OUTOF10: 5
PAINLEVEL_OUTOF10: 5

## 2021-03-04 ASSESSMENT — PAIN DESCRIPTION - ORIENTATION
ORIENTATION: LEFT

## 2021-03-04 ASSESSMENT — PAIN DESCRIPTION - PAIN TYPE: TYPE: CHRONIC PAIN

## 2021-03-04 ASSESSMENT — PAIN DESCRIPTION - DESCRIPTORS
DESCRIPTORS: ACHING;DISCOMFORT
DESCRIPTORS: SORE;TIGHTNESS

## 2021-03-04 ASSESSMENT — PAIN - FUNCTIONAL ASSESSMENT: PAIN_FUNCTIONAL_ASSESSMENT: ACTIVITIES ARE NOT PREVENTED

## 2021-03-04 NOTE — PROGRESS NOTES
Occupational Therapy     03/04/21 1100   Pre Treatment Pain Screening   Intervention List Patient able to continue with treatment   Pain Assessment   Patient Currently in Pain Yes   Pain Assessment 0-10   Pain Level 5   Pain Type Surgical pain   Pain Location Hip   Pain Orientation Left   Pain Descriptors Sore;Tightness   Pain Frequency Intermittent   Clinical Progression Not changed   Response to Pain Intervention Patient Satisfied   Balance   Sitting Balance Independent   Standing Balance Supervision   Functional Mobility   Functional - Mobility Device Rolling Walker   Activity Other;Retrieve items;Transport items   Assist Level Supervision   Functional Mobility Comments Light homemaking task in OT kitchen. Bed mobility   Supine to Sit Modified independent   Sit to Supine Modified independent   Transfers   Sit to stand Supervision   Stand to sit Supervision   Transfer Comments To/from various types of seats, multiple attempts before able to come to full stand from short height seat. Assessment   Performance deficits / Impairments Decreased functional mobility ; Decreased ADL status; Decreased strength;Decreased endurance;Decreased high-level IADLs   Treatment Diagnosis Left IT hip fx s/p cephalomedullary nail   Prognosis Good   Timed Code Treatment Minutes 60 Minutes   Activity Tolerance   Activity Tolerance Patient Tolerated treatment well   Safety Devices   Safety Devices in place Yes   Type of devices Call light within reach; Bed alarm in place   Plan   Current Treatment Recommendations Strengthening;Balance Training;Functional Mobility Training;Self-Care / ADL;Endurance Training      03/04/21 1100   Putting On/Taking Off Footwear   Assistance Needed Partial/moderate assistance   Comment Required assistance with donning shoe on L foot.    CARE Score 3

## 2021-03-04 NOTE — PROGRESS NOTES
03/04/21 0900   Restrictions/Precautions   Restrictions/Precautions Fall Risk;Weight Bearing   Lower Extremity Weight Bearing Restrictions   Left Lower Extremity Weight Bearing Weight Bearing As Tolerated   General   Diagnosis Cerebral infarction, left body involvement (right brain)   Pain Assessment   Pain Assessment 0-10   Pain Level 3   Orientation   Overall Orientation Status WFL   Bed Mobility   Rolling Stand by assistance   Supine to Sit Modified independent   Sit to Supine Modified independent   Transfers   Sit to Stand Stand by assistance; Independent   Stand to sit Stand by assistance; Independent   Ambulation 1   Surface level tile   Device Rolling Walker   Other [place] Wheelchair follow   Assistance Stand by assistance   Quality of Gait Mild antalgic gait pattern. No LOB. Gait Deviations Slow Kayleen;Decreased step length;Decreased step height   Distance 150'    Comments Incorporated turns. Stairs   # Steps  4   Stairs Height 4\"   Rails Bilateral   Device No Device   Assistance Minimal assistance   Comment Pt requires verbal cues for technique. Does not always heed them. Propulsion 1   Propulsion Manual   Level Level Tile   Method RUE;LUE   Level of Assistance Stand by assistance   Description/ Details Incorporated turns   Distance 150'   Exercises   Comments Sitting RLE ex x 15 reps. A-AAROM with LLE. Conditions Requiring Skilled Therapeutic Intervention   Body structures, Functions, Activity limitations Decreased functional mobility ; Decreased ADL status; Decreased strength;Decreased endurance   Assessment Had good tolerance for therapy today. Improved ambulation and ther. ex tolerance. Activity Tolerance   Activity Tolerance Patient limited by endurance; Patient Tolerated treatment well   Safety Devices   Type of devices Bed alarm in place;Call light within reach

## 2021-03-04 NOTE — PROGRESS NOTES
Reported to me by  that patient refused lab draw this am.  Electronically signed by Marty Wang RN on 3/4/21 at 4:43 AM CST

## 2021-03-04 NOTE — PROGRESS NOTES
Genitourinary: No Dysuria  Neurological: No headache, no confusion    Past Medical History:      Diagnosis Date    Cerebral artery occlusion with cerebral infarction ([facility] [address]     tia    Hypertension        Past Surgical History:      Procedure Laterality Date    FEMUR FRACTURE SURGERY Left 2/13/2021    FEMUR IM NAIL GILDARDO INSERTION performed by Evangelina Monzon MD at [address] [place] [address]       Medications in Hospital:      Current Facility-Administered Medications:     polyethylene glycol (GLYCOLAX) packet 17 g, 17 g, Oral, Daily, Meek Goetz MD, 17 g at 03/04/21 0817    miconazole (MICOTIN) 2 % powder, , Topical, BID, Meek Goetz MD, Given at 03/04/21 0829    lisinopril (PRINIVIL;ZESTRIL) tablet 10 mg, 10 mg, Oral, Daily, Meek Goetz MD, 10 mg at 03/04/21 0821    enoxaparin (LOVENOX) injection 40 mg, 40 mg, Subcutaneous, Daily, Richard Wall MD, 40 mg at 03/04/21 0821    oxyCODONE-acetaminophen (PERCOCET) 5-325 MG per tablet 1 tablet, 1 tablet, Oral, Q6H PRN, Richard Wall MD, 1 tablet at 02/27/21 1037    acetaminophen (TYLENOL) tablet 650 mg, 650 mg, Oral, Q6H PRN **OR** acetaminophen (TYLENOL) suppository 650 mg, 650 mg, Rectal, Q6H PRN, Richard Wall MD    magnesium hydroxide (MILK OF MAGNESIA) 400 MG/5ML suspension 30 mL, 30 mL, Oral, Daily PRN, Richard Wall MD, 30 mL at 03/03/21 2044    potassium chloride (KLOR-CON M) extended release tablet 40 mEq, 40 mEq, Oral, PRN **OR** potassium bicarb-citric acid (EFFER-K) effervescent tablet 40 mEq, 40 mEq, Oral, PRN **OR** potassium chloride 10 mEq/100 mL IVPB (Peripheral Line), 10 mEq, Intravenous, PRN, Richard Wall MD    promethazine (PHENERGAN) tablet 12.5 mg, 12.5 mg, Oral, Q6H PRN **OR** ondansetron (ZOFRAN) injection 4 mg, 4 mg, Intravenous, Q6H PRN, Richard Wall MD, 4 mg at 02/23/21 1951    sodium chloride flush 0.9 % injection 10 mL, 10 mL, Intravenous, PRN, Richard Wall MD   dextrose 5 % solution, 100 mL/hr, Intravenous, PRN, Clary Lomeli MD    dextrose 50 % IV solution, 12.5 g, Intravenous, PRN, Clary Lomeli MD    glucagon (rDNA) injection 1 mg, 1 mg, Intramuscular, PRN, Clary Lomeli MD    glucose (GLUTOSE) 40 % oral gel 15 g, 15 g, Oral, PRN, Clary Lomeli MD    aspirin EC tablet 81 mg, 81 mg, Oral, Daily, Clary Lomeli MD, 81 mg at 03/04/21 1610    atorvastatin (LIPITOR) tablet 20 mg, 20 mg, Oral, Nightly, Clary Lomeli MD, 20 mg at 03/03/21 2044    cyanocobalamin injection 1,000 mcg, 1,000 mcg, Intramuscular, Q7 Days, 1,000 mcg at 02/27/21 0936 **AND** [START ON 5/6/2021] cyanocobalamin injection 1,000 mcg, 1,000 mcg, Intramuscular, Q30 Days, Clary Lomeli MD    folic acid (FOLVITE) tablet 1 mg, 1 mg, Oral, Daily, Clary Lomeli MD, 1 mg at 03/04/21 4756    insulin lispro (HUMALOG) injection vial 0-6 Units, 0-6 Units, Subcutaneous, TID WC, Clary Lomeli MD, Stopped at 02/24/21 1623    insulin lispro (HUMALOG) injection vial 0-3 Units, 0-3 Units, Subcutaneous, Nightly, Clary Lomeli MD, 1 Units at 03/02/21 2130    melatonin disintegrating tablet 5 mg, 5 mg, Oral, Nightly, Clary Lomeli MD, 5 mg at 03/02/21 2129    multivitamin 1 tablet, 1 tablet, Oral, Daily, Clary Lomeli MD, 1 tablet at 03/04/21 0821    nicotine (NICODERM CQ) 21 MG/24HR 1 patch, 1 patch, Transdermal, Daily, Clary Lomeli MD, 1 patch at 03/04/21 0828    traZODone (DESYREL) tablet 50 mg, 50 mg, Oral, Nightly PRN, Clary Lomeli MD, 50 mg at 03/01/21 2126    acetaminophen (TYLENOL) tablet 650 mg, 650 mg, Oral, Q4H PRN, Jenny James MD, 650 mg at 02/28/21 1506    Allergies:  Patient has no known allergies. Social History:   TOBACCO:   reports that he has been smoking. He has been smoking about 1.00 pack per day. He has never used smokeless tobacco.  ETOH:   reports no history of alcohol use.     Family History: No family history on file. PHYSICAL EXAM:  BP (!) 145/68   Pulse 70   Temp 97.1 °F (36.2 °C) (Temporal)   Resp 16   Ht 6' (1.829 m)   Wt 173 lb 0.3 oz (78.5 kg)   SpO2 94%   BMI 23.47 kg/m²     Constitutional  well developed, well nourished. Eyes  conjunctiva normal.   Ear, nose, throat - No scars, masses, or lesions over external nose or ears, no atrophy of tongue  Neck-symmetric, no masses noted, no jugular vein distension  Respiration- chest wall appears symmetric, good expansion,   normal effort without use of accessory muscles  Musculoskeletal  no significant wasting of muscles noted, no bony deformities  Extremities-no clubbing, cyanosis or edema  Skin  warm, dry, and intact. No rash, erythema, or pallor. Psychiatric  mood, affect, and behavior appear normal.      Neurological exam  Awake, alert, fluent oriented appropriate affect  Attention and concentration appear appropriate  Recent and remote memory appears unremarkable  Speech normal without dysarthria  No clear issues with language of fund of knowledge     Cranial Nerve Exam     CN III, IV,VI-EOMI, No nystagmus, conjugate eye movements, no ptosis    CN VII-no facial assymetry       Motor Exam  antigravity throughout upper and lower extremities bilaterally      Tremors- no tremors in hands or head noted     Gait  Not tested      Nursing/pcp notes, imaging,labs and vitals reviewed.      PT,OT and/or speech notes reviewed    Lab Results   Component Value Date    WBC 7.7 03/01/2021    HGB 8.8 (L) 03/01/2021    HCT 27.6 (L) 03/01/2021    MCV 97.2 (H) 03/01/2021     03/01/2021     Lab Results   Component Value Date     03/01/2021    K 4.3 03/01/2021     03/01/2021    CO2 28 03/01/2021    BUN 27 (H) 03/01/2021    CREATININE 0.8 03/01/2021    GLUCOSE 101 03/01/2021    CALCIUM 8.7 (L) 03/01/2021    PROT 6.1 (L) 03/01/2021    LABALBU 3.0 (L) 03/01/2021    BILITOT 0.5 03/01/2021    ALKPHOS 105 03/01/2021    AST 21 03/01/2021 ALT 26 03/01/2021    LABGLOM >60 03/01/2021    GFRAA >59 03/01/2021     Lab Results   Component Value Date    INR 1.11 02/22/2021    INR 1.07 02/13/2021    PROTIME 14.2 02/22/2021    PROTIME 13.9 02/13/2021     Denise Chambers PTA   Physical Therapist Assistant   Specialty:  Physical Therapy   Progress Notes   Signed   Date of Service:  3/2/2021  1:57 PM               Signed             Show:Clear all  []Manual[x]Template[]Copied    Added by:  [x]Sherry An PTA    []Hung for details       03/02/21 1312 03/02/21 1313 03/02/21 1331   Pain Screening   Patient Currently in Pain No  --   --    Bed Mobility   Supine to Sit Modified independent  --   --    Comment Uses leg   --   --    Transfers   Sit to Stand  --  Stand by assistance  --    Stand to sit  --  Stand by assistance  --    Bed to Chair  --  Stand by assistance  (With RW)  --    Ambulation   Ambulation?  --  Yes  --    WB Status  --  FWBAT LLE  --    Ambulation 1   Surface  --  level tile  --    Device  --  Rolling Walker  --    Assistance  --  Contact guard assistance;Stand by assistance  --    Quality of Gait  --  Antalgic gait pattern. No LOB. --    Gait Deviations  --  Slow Kayleen;Decreased step length;Decreased step height  --    Distance  --  79' x2  --    Comments  --  Incorporated turns. --    Conditions Requiring Skilled Therapeutic Intervention   Assessment  --   --  Jazmyn session with rests. Activity Tolerance   Activity Tolerance  --   --  Patient limited by endurance; Patient limited by pain   Safety Devices   Type of devices  --   --   --         03/02/21 1340   Pain Screening   Patient Currently in Pain  --    Bed Mobility   Supine to Sit  --    Comment  --    Transfers   Sit to Stand  --    Stand to sit  --    Bed to Chair  --    Ambulation   Ambulation?  --    WB Status  --    Ambulation 1   Surface  --    Device  --    Assistance  --    Quality of Gait  --    Gait Deviations  --    Distance  --    Comments  -- Conditions Requiring Skilled Therapeutic Intervention   Assessment  --    Activity Tolerance   Activity Tolerance  --    Safety Devices   Type of devices Call light within reach; Chair alarm in place   Electronically signed by Sarah Pacheco PTA on 3/2/2021 at 1:58 PM            Cosigned by: Jose D Escudero, PT at 3/2/2021  3:08 PM   Revision History                        RECORD REVIEW: Previous medical records, medications were reviewed at today's visit    IMPRESSION:   1. Acute ischemic stroke-ASA/statin  2. Hyperlipidemia-on statin  3. Vitamin B12 deficiency-on Vitamin B12  4. HTN-on meds monitor   5. Smoker-on nicoderm patch  6. GI-bowel regimen  7. Pain control -Percocet prn  8. S/p left hip surgery-WBAT  9. DVT prophylaxis-Lovenox  10. Encephalopathy-metabolic encephalopathy monitor   11. PT/OT  12.  Insomnia-melatonin    Continue current care    DC tomorrow   DC Lovenox on discharge and start ECASA 325 mg one month post surgery     Expected duration and frequency therapy: 180 minutes per day, 5 days per week    Alice Kenny  [phone] CELL  Dr Carlos Mcqueen

## 2021-03-04 NOTE — PLAN OF CARE
Problem: Falls - Risk of:  Goal: Will remain free from falls  Description: Will remain free from falls  3/3/2021 2247 by Pollo Bone RN  Outcome: Ongoing  3/3/2021 1116 by Jose Daniel Rivera RN  Outcome: Ongoing  Goal: Absence of physical injury  Description: Absence of physical injury  3/3/2021 2247 by Pollo Bone RN  Outcome: Ongoing  3/3/2021 1116 by Jose Daniel Rivera RN  Outcome: Ongoing     Problem: Mobility - Impaired:  Goal: Mobility will improve  Description: Mobility will improve  3/3/2021 2247 by Pollo Bone RN  Outcome: Ongoing  3/3/2021 1116 by Jose Daniel Rivera RN  Outcome: Ongoing     Problem: Infection:  Goal: Will remain free from infection  Description: Will remain free from infection  3/3/2021 2247 by Pollo Bone RN  Outcome: Ongoing  3/3/2021 1116 by Jose Daniel Rivera RN  Outcome: Ongoing     Problem: Safety:  Goal: Free from accidental physical injury  Description: Free from accidental physical injury  3/3/2021 2247 by Pollo Bone RN  Outcome: Ongoing  3/3/2021 1116 by Jose Daniel Rivera RN  Outcome: Ongoing  Goal: Free from intentional harm  Description: Free from intentional harm  3/3/2021 2247 by Pollo Bone RN  Outcome: Ongoing  3/3/2021 1116 by Jose Daniel Rivera RN  Outcome: Ongoing

## 2021-03-04 NOTE — PROGRESS NOTES
Nutrition Assessment     Type and Reason for Visit: Reassess    Nutrition Recommendations/Plan: Continue ONS     Nutrition Assessment:  Pt reports good appetite and po intake appears adequate per records. Pt consuming magic cups. Will continue to monitor while admitted. Nutrition Related Findings: LLE Non-pitting edema      Current Nutrition Therapies:    Dietary Nutrition Supplements: Frozen Oral Supplement  DIET GENERAL; Anthropometric Measures:  · Height: 6' (182.9 cm)  · Current Body Wt: 173 lb (78.5 kg)   · BMI: 23.5    Nutrition Interventions:   Food and/or Nutrient Delivery:  Continue Current Diet, Continue Oral Nutrition Supplement  Coordination of Nutrition Care:  Continue to monitor while inpatient    Goals:  Pt will consume 75% or greater of meals.        Nutrition Monitoring and Evaluation:   Food/Nutrient Intake Outcomes:  Food and Nutrient Intake, Supplement Intake  Physical Signs/Symptoms Outcomes:  Biochemical Data, Nutrition Focused Physical Findings, Skin, Weight     Electronically signed by Brie Pa, MS, RD, LD on 3/4/21 at 1:34 PM CST    Contact: [phone]

## 2021-03-04 NOTE — PROGRESS NOTES
Occupational Therapy     03/04/21 1300   Pain Assessment   Patient Currently in Pain Yes   Pain Assessment 0-10   Pain Level 5   Pain Type Chronic pain   Pain Location Hip   Pain Orientation Left   Balance   Sitting Balance Independent   Standing Balance Supervision   Bed mobility   Supine to Sit Modified independent   Transfers   Stand Step Transfers Stand by assistance   Sit to stand Supervision   Stand to sit Supervision   Type of ROM/Therapeutic Exercise   Type of ROM/Therapeutic Exercise Free weights   Comment 2# 2 sets of 20 reps, HEP. Assessment   Performance deficits / Impairments Decreased functional mobility ; Decreased ADL status; Decreased strength;Decreased endurance;Decreased high-level IADLs   Assessment Pt educated on HEP, completed w/VC. Treatment Diagnosis Left IT hip fx s/p cephalomedullary nail   Prognosis Good   Timed Code Treatment Minutes 45 Minutes   Activity Tolerance   Activity Tolerance Patient Tolerated treatment well   Safety Devices   Safety Devices in place Yes   Type of devices Call light within reach; Bed alarm in place   Plan   Current Treatment Recommendations Strengthening;Balance Training;Functional Mobility Training;Self-Care / ADL;Endurance Training

## 2021-03-04 NOTE — CARE COORDINATION
Mr. 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 His wife has participated in care and believes she can manage him at home-expect discharge 3/5/21. Referral made to [facility].

## 2021-03-04 NOTE — DISCHARGE SUMMARY
Neurology Discharge Summary     Patient Identification:  Marck Damon is a 67 y.o. male. :  1945  Admit Date:  2021  Discharge date : 2021   Attending Provider: Ankit Flowers MD     Account Number: [de-identified]                                   Admission Diagnoses:   CVA (cerebral vascular accident) [facility]) [I63.9]  Acute ischemic stroke [facility]) [I63.9]    Discharge Diagnoses:  Principal Problem:    Acute ischemic stroke [facility])  Active Problems:    Hip fracture requiring operative repair, left, closed, initial encounter ([facility] [address]    Hyperglycemia    Essential hypertension    Fall from slipping on ice    Altered mental state    Smoker    Pain in hip    Gait abnormality    Vitamin B12 deficiency    Other hyperlipidemia    Other insomnia  Resolved Problems:    * No resolved hospital problems. *      Discharge Medications:    Current Discharge Medication List           Details   lisinopril (PRINIVIL;ZESTRIL) 10 MG tablet Take 1 tablet by mouth daily  Qty: 30 tablet, Refills: 0      miconazole (MICOTIN) 2 % powder Apply topically 2 times daily.   Qty: 45 g, Refills: 0      cyanocobalamin 1000 MCG/ML injection Inject 1 mL into the muscle every 30 days  Qty: 1 vial, Refills: 0              Details   aspirin 325 MG EC tablet Take 1 tablet by mouth daily  Qty: 30 tablet, Refills: 0      atorvastatin (LIPITOR) 20 MG tablet Take 1 tablet by mouth nightly  Qty: 30 tablet, Refills: 0      melatonin 5 MG TBDP disintegrating tablet Take 1 tablet by mouth nightly  Qty: 30 tablet, Refills: 0      nicotine (NICODERM CQ) 21 MG/24HR Place 1 patch onto the skin daily  Qty: 30 patch, Refills: 0              Details   folic acid (FOLVITE) 1 MG tablet Take 1 tablet by mouth daily  Qty: 30 tablet, Refills: 0      Multiple Vitamin (MULTIVITAMIN) TABS tablet Take 1 tablet by mouth daily  Qty: 30 tablet, Refills: 0           Current Discharge Medication List      STOP taking these medications       oxyCODONE-acetaminophen

## 2021-03-04 NOTE — PROGRESS NOTES
Disposition: Continue current course, follow-up in my office in 1 month.       Electronically signed by Kristy Vasquez MD on 3/4/2021 at 6:43 AM

## 2021-03-05 VITALS
DIASTOLIC BLOOD PRESSURE: 71 MMHG | WEIGHT: 173.02 LBS | TEMPERATURE: 97.1 F | SYSTOLIC BLOOD PRESSURE: 128 MMHG | OXYGEN SATURATION: 90 % | BODY MASS INDEX: 23.43 KG/M2 | HEIGHT: 72 IN | HEART RATE: 66 BPM | RESPIRATION RATE: 16 BRPM

## 2021-03-05 PROCEDURE — 6370000000 HC RX 637 (ALT 250 FOR IP): Performed by: PSYCHIATRY & NEUROLOGY

## 2021-03-05 PROCEDURE — 6370000000 HC RX 637 (ALT 250 FOR IP): Performed by: INTERNAL MEDICINE

## 2021-03-05 RX ADMIN — Medication 81 MG: at 08:47

## 2021-03-05 RX ADMIN — LISINOPRIL 10 MG: 10 TABLET ORAL at 08:47

## 2021-03-05 RX ADMIN — THERA TABS 1 TABLET: TAB at 08:46

## 2021-03-05 RX ADMIN — FOLIC ACID 1 MG: 1 TABLET ORAL at 08:47

## 2021-03-05 NOTE — PLAN OF CARE
Problem: Falls - Risk of:  Goal: Will remain free from falls  Description: Will remain free from falls  Outcome: Ongoing  Goal: Absence of physical injury  Description: Absence of physical injury  Outcome: Ongoing     Problem: Mobility - Impaired:  Goal: Mobility will improve  Description: Mobility will improve  Outcome: Ongoing     Problem: Infection:  Goal: Will remain free from infection  Description: Will remain free from infection  Outcome: Ongoing     Problem: Safety:  Goal: Free from accidental physical injury  Description: Free from accidental physical injury  Outcome: Ongoing  Goal: Free from intentional harm  Description: Free from intentional harm  Outcome: Ongoing     Problem: Daily Care:  Goal: Daily care needs are met  Description: Daily care needs are met  Outcome: Ongoing

## 2021-03-06 NOTE — DISCHARGE SUMMARY
Occupational Therapy Discharge Summary         Date: 3/6/2021  Patient Name: Jean Minor        MRN: [id number]    : 1945  (67 y.o.)  Gender: male      Diagnosis: Cerebral infarction, left body involvement (right brain)  Restrictions/Precautions  Restrictions/Precautions: Fall Risk, Weight Bearing  Required Braces or Orthoses?: No      Discharge Date: 21      UE Functioning:  WFLs    Home Management:  Functional Mobility  Functional - Mobility Device: Rolling Walker  Activity: Other, Retrieve items, Transport items, MRADLs and home making tasks  Assist Level: Supervision      Adaptive Equipment/DME Status:   Arranged wheelchair, BSC  Home Equipment: RW and TTB      Pain Assessment:  Pain Level: 5  Pain Location: Hip    Remaining Problems:  Decreased functional mobility ; Decreased ADL status; Decreased strength; Decreased endurance; Decreased high-level IADLs    STGs:  Short term goals  Time Frame for Short term goals: 1 week  Short term goal 1: Supervision with bathing hygiene. Short term goal 2: Supervision with clothing management/hygiene for toileting. Short term goal 3: Supervision with toilet transfers. Short term goal 4: Supervision with LB dressing using AE. Short term goal 5: Supervision with one-two handed static standing task for 3 minutes. Met 4/5 STGs    LTGs:  Long term goals  Time Frame for Long term goals : 2 weeks  Long term goal 1: Modified independent with bathing hygiene. Long term goal 2: Modified independent with toileting and toilet transfers. Long term goal 3: Modified independent with overall dressing. Long term goal 4: Patient verbalize DME needs. Met 1/4 LTGs    Discharge Setting and Recommendations:  Home with spouse. Home health Occupational Therapy to address remaining deficits.      Discharge Care Scores  Eating: CARE Score: 6  Oral Hygiene: CARE Score: 5  Toileting: CARE Score: 4  Shower/Bathe: CARE Score: 4  Upper Body Dressing: CARE Score: 6  Lower Body Dressing: CARE Score: 3  Footwear: CARE Score: 3  Toilet Transfers: CARE Score: 4  Picking Up Object:  CARE Score: 4    Electronically signed by STEFANIE Hammer on 3/6/21 at 10:24 AM CST

## 2021-03-06 NOTE — DISCHARGE SUMMARY
Physical Therapy DISCHARGE Note  DATE:  3/6/2021  NAME:  Jessica Mcmahon  :  1945  (75 y.o.,male)  MRN:  [id number]    HEIGHT:  Height: 6' (182.9 cm)  WEIGHT:  Weight: 173 lb 0.3 oz (78.5 kg)    PATIENT DIAGNOSIS(ES):    Diagnosis: Cerebral infarction, left body involvement (right brain)    Additional Pertinent Hx: primary HTN, hyperglycemia, displaced intertrochanteric fx of L femur, nicotine dependence (cigarettes), encephalopathy, TIA, cerebral artery occlusion  RESTRICTIONS/PRECAUTIONS:    Restrictions/Precautions  Restrictions/Precautions: Fall Risk, Weight Bearing  Required Braces or Orthoses?: No     OVERALL  ORIENTATION STATUS:  Overall Orientation Status: Within Functional Limits  PAIN:  Pain Level: 0  Pain Type: Acute pain    Pain Location: Hip     Pain Orientation: Left      NEUROLOGICAL                          STRENGTH  Strength RLE  Strength RLE: WNL  Strength LLE  Strength LLE: Exception  Comment: ankle 3+/5, knee and hip grossly 2-/5  ROM  AROM RLE (degrees)  RLE AROM: WNL     POSTURE/BALANCE  Balance  Sitting - Static: Fair  Standing - Dynamic: Poor       ACTIVITY TOLERANCE  Activity Tolerance  Activity Tolerance: Patient limited by endurance, Patient Tolerated treatment well      BED MOBILITY  Bed Mobility  Rolling: Independent  Supine to Sit: Independent  Sit to Supine: Independent  Scooting: Independent  Comment: Uses leg   TRANSFERS  Sit to Stand: Independent      Bed to Chair: Independent  Car Transfer: Stand by assistance     WHEELCHAIR PROPULSION 1  Propulsion 1  Propulsion: Manual  Level: Level Tile  Method: CHERELLE COE  Level of Assistance: Stand by assistance  Description/ Details: Incorporated turns  Distance: 150'  WHEELCHAIR PROPULSION 2     AMBULATION 1  Ambulation 1  Surface: level tile  Device: Rolling Walker  Other Apparatus: Wheelchair follow  Assistance: Stand by assistance  Quality of Gait: Mild antalgic gait pattern. No LOB.   Gait Deviations: Slow Kayleen, Decreased step length, Decreased step height  Distance: 150'   Comments: Incorporated turns. AMBULATION 2     STAIRS  Stairs  # Steps : 4  Stairs Height: 4\"  Rails: Bilateral  Curbs: 4\"  Device: No Device  Assistance: Minimal assistance  Comment: Pt requires verbal cues for technique. Does not always heed them. GOALS:  Short term goals  Time Frame for Short term goals: 1 week  Short term goal 1: Car TF min assist  Short term goal 2: Sit<>Stand contact guard  Short term goal 3: L LE grossly 3/5 on MMT  Short term goal 4: Ambulate 12' x 2 in parallel bars   Short term goal 5: Propel 150' in WC independent. Long term goals  Time Frame for Long term goals : 2-3 weeks  Long term goal 1: Car TF SBA  Long term goal 2: Bed mobility SBA/independent  Long term goal 3: Sit<>Supine SBA/Independent  Long term goal 4: Ambulate 48' in RW with SBA  Long term goal 5: Perform 3 stairs with SBA  HOME LIVING:     Type of Home: House  Home Layout: One level  Home Access: Stairs to enter with rails  Entrance Stairs - Number of Steps: 3  Entrance Stairs - Rails: Both  ASSESSMENT (IMPAIRMENTS/BARRIERS): Body structures, Functions, Activity limitations: Decreased functional mobility , Decreased ADL status, Decreased strength, Decreased endurance  Assessment: Had good tolerance for therapy today. Improved ambulation and ther. ex tolerance.    Activity Tolerance: Patient limited by endurance, Patient Tolerated treatment well     PLAN:  Plan  Times per week: 5-6  Times per day: Twice a day  Plan weeks: 2-3  Current Treatment Recommendations: Strengthening, ROM, Balance Training, Functional Mobility Training, Transfer Training, Gait Training, Safety Education & Training, Positioning, Equipment Evaluation, Education, & procurement, Patient/Caregiver Education & Training, Home Exercise Program  Discharge Recommendations: Continue to assess pending progress, Patient would benefit from continued therapy after discharge, 24 hour supervision or assist, Home with Home health PT  PATIENT REQUIRES AND IS REASONABLY EXPECTED TO ACTIVELY PARTICIPATE IN AT LEAST 3 HOURS OF INTENSIVE THERAPY PER DAY AT LEAST 5 DAYS PER WEEK, AND BE EXPECTED TO MAKE MEASURABLE IMPROVEMENT THAT WILL BE OF PRACTICAL VALUE TO IMPROVE THE PATIENT'S FUNCTIONAL CAPACITY OR ADAPTATION TO IMPAIRMENTS.    PATIENT GOAL FOR REHAB:  RETURN TO PRIOR LEVEL OF FUNCTION       IRF/FENG  Roll Left and Right  Assistance Needed: Independent  CARE Score: 6  Discharge Goal: Independent    Sit to Lying  Assistance Needed: Partial/moderate assistance  Comment: IND  CARE Score: 3  Discharge Goal: Independent    Lying to Sitting on Side of Bed  Assistance Needed: Independent  CARE Score: 6  Discharge Goal: Independent    Sit to Stand  Assistance Needed: Independent  CARE Score: 6  Discharge Goal: Independent    Chair/Bed-to-Chair Transfer  Assistance Needed: Independent  CARE Score: 6  Discharge Goal: Independent    Car Transfer  Assistance Needed: Supervision or touching assistance  CARE Score: 4  Discharge Goal: Supervision or touching assistance    Walk 10 Feet  Assistance Needed: Supervision or touching assistance  Reason if not Attempted: Not attempted due to medical condition or safety concerns  CARE Score: 4  Discharge Goal: Independent    Walk 50 Feet with Two Turns  Assistance Needed: Supervision or touching assistance  Reason if not Attempted: Not attempted due to medical condition or safety concerns  CARE Score: 4  Discharge Goal: Supervision or touching assistance    Walk 150 Feet  Assistance Needed: Supervision or touching assistance  Reason if not Attempted: Not attempted due to medical condition or safety concerns  CARE Score: 4  Discharge Goal: Not Attempted    Walking 10 Feet on Uneven Surfaces  Assistance Needed: Supervision or touching assistance  Reason if not Attempted: Not attempted due to medical condition or safety concerns  CARE Score: 4  Discharge Goal: Supervision or touching assistance 1 Step (Curb)  Assistance Needed: Supervision or touching assistance  Reason if not Attempted: Not attempted due to medical condition or safety concerns  CARE Score: 4  Discharge Goal: Supervision or touching assistance    4 Steps  Assistance Needed: Supervision or touching assistance  Reason if not Attempted: Not attempted due to medical condition or safety concerns  CARE Score: 4  Discharge Goal: Not Attempted    12 Steps  Reason if not Attempted: Not attempted due to medical condition or safety concerns  CARE Score: 88  Discharge Goal: Not Attempted    Wheel 50 Feet with Two Turns  Assistance Needed: Supervision or touching assistance  CARE Score: 4  Discharge Goal: Independent    Wheel 150 Feet  Assistance Needed: Supervision or touching assistance  CARE Score: 4  Discharge Goal: Independent      LAST TREATMENT TIME  PT Individual Minutes  Time In: 1430  Time Out: 7777  Minutes: 39

## 2024-12-02 ENCOUNTER — APPOINTMENT (OUTPATIENT)
Dept: CT IMAGING | Age: 79
End: 2024-12-02
Payer: MEDICARE

## 2024-12-02 ENCOUNTER — HOSPITAL ENCOUNTER (EMERGENCY)
Age: 79
Discharge: HOME | End: 2024-12-02
Payer: MEDICARE

## 2024-12-02 VITALS
DIASTOLIC BLOOD PRESSURE: 78 MMHG | RESPIRATION RATE: 18 BRPM | HEART RATE: 81 BPM | WEIGHT: 173 LBS | HEIGHT: 70 IN | OXYGEN SATURATION: 99 % | SYSTOLIC BLOOD PRESSURE: 141 MMHG | TEMPERATURE: 98.6 F | BODY MASS INDEX: 24.77 KG/M2

## 2024-12-02 DIAGNOSIS — S32.592A CLOSED FRACTURE OF INFERIOR PUBIC RAMUS, LEFT, INITIAL ENCOUNTER (HCC): Primary | ICD-10-CM

## 2024-12-02 DIAGNOSIS — S32.10XA CLOSED FRACTURE OF SACRUM, UNSPECIFIED PORTION OF SACRUM, INITIAL ENCOUNTER (HCC): ICD-10-CM

## 2024-12-02 PROCEDURE — 72192 CT PELVIS W/O DYE: CPT

## 2024-12-02 PROCEDURE — 70450 CT HEAD/BRAIN W/O DYE: CPT

## 2024-12-02 PROCEDURE — 99284 EMERGENCY DEPT VISIT MOD MDM: CPT

## 2024-12-02 ASSESSMENT — ENCOUNTER SYMPTOMS
GASTROINTESTINAL NEGATIVE: 1
RESPIRATORY NEGATIVE: 1
COLOR CHANGE: 1

## 2024-12-02 NOTE — DISCHARGE INSTRUCTIONS
You must use your walker at all times.  Ask for assistance with any stairs.  If you develop any urinary problems such as blood in your urine, return to the ER immediately.  Pain medicine as prescribed by [facility].  No heavy lifting.   Follow-up with the orthopedic doctors next available appointment.  Call first thing in the morning to make an appointment.  Tell them you are in the emergency department for a fractured pelvis.  Return to ER for any new, worsening, or change in condition.

## 2024-12-02 NOTE — ED PROVIDER NOTES
display    All other labs were within normal range or not returned as of this dictation.    Medications - No data to display    EMERGENCY DEPARTMENT COURSE and DIFFERENTIALDIAGNOSIS/MDM:   Vitals:    Vitals:    12/02/24 1545 12/02/24 1828   BP: (!) 144/75 (!) 141/78   Pulse: 87 81   Resp: 18 18   Temp: 97.4 °F (36.3 °C) 98.6 °F (37 °C)   TempSrc:  Oral   SpO2: 94% 99%   Weight: 78.5 kg (173 lb)    Height: 1.778 m (5' 10\")        MDM  Number of Diagnoses or Management Options  Closed fracture of inferior pubic ramus, left, initial encounter ([facility])  Closed fracture of sacrum, unspecified portion of sacrum, initial encounter ([facility])  Diagnosis management comments: 79-year-old nontoxic-appearing male presented to the emergency department with bruising around his left eye and eyebrow after sustaining a fall in his home.  Patient has known pubic rami fracture as well as rib fractures.  He was sent to the emergency department from [facility] for CAT scan.  CT head was without any acute intercranial process and shows small vessel changes from age-related changes.  There is left frontal periorbital hematoma without associated fractures.  CT of the pelvis shows comminuted fractures of the left pubic rami with adjacent fat stranding and possible hematoma.  Patient has had no bleeding from his bladder and has had no trouble urinating.  Do not suspect any bladder damage at this time.  Discussed admission versus outpatient follow-up and patient wishes to follow-up with Ortho and go home.  I have discussed my findings, my impression, and my differential diagnosis to the patient. The patient understands the risks, benefits, and alternatives of an inpatient versus outpatient continued evaluation and treatment. The patient has capacity to make medical decisions. The patient understands the importance of follow-up and agrees to return if the condition changes, worsens, or if the patient changes his/her mind about inpatient

## 2024-12-03 ENCOUNTER — TELEPHONE (OUTPATIENT)
Age: 79
End: 2024-12-03

## 2024-12-03 NOTE — TELEPHONE ENCOUNTER
Korbel E Umana  [birth year] [id number]  [facility] 12/2/24  Fx pelvis (2)  Nondisplaced  DOI 11/291/24  Yes walker  Yes CT scan  Medicare  [phone]

## 2024-12-03 NOTE — TELEPHONE ENCOUNTER
Called patient's wife back set up follow up appointment on his fracture with [facility]. She requested that we wait to follow up until after the new year.

## 2025-08-07 ENCOUNTER — OFFICE VISIT (OUTPATIENT)
Dept: INTERNAL MEDICINE | Age: 80
End: 2025-08-07
Payer: MEDICARE

## 2025-08-07 VITALS
DIASTOLIC BLOOD PRESSURE: 63 MMHG | OXYGEN SATURATION: 93 % | BODY MASS INDEX: 24.85 KG/M2 | WEIGHT: 173.6 LBS | HEIGHT: 70 IN | HEART RATE: 67 BPM | SYSTOLIC BLOOD PRESSURE: 122 MMHG

## 2025-08-07 DIAGNOSIS — Z91.81 AT HIGH RISK FOR FALLS: ICD-10-CM

## 2025-08-07 DIAGNOSIS — J44.1 COPD EXACERBATION (HCC): Primary | ICD-10-CM

## 2025-08-07 PROCEDURE — 1123F ACP DISCUSS/DSCN MKR DOCD: CPT

## 2025-08-07 PROCEDURE — 4004F PT TOBACCO SCREEN RCVD TLK: CPT

## 2025-08-07 PROCEDURE — 1159F MED LIST DOCD IN RCRD: CPT

## 2025-08-07 PROCEDURE — 3023F SPIROM DOC REV: CPT

## 2025-08-07 PROCEDURE — G8420 CALC BMI NORM PARAMETERS: HCPCS

## 2025-08-07 PROCEDURE — 3078F DIAST BP <80 MM HG: CPT

## 2025-08-07 PROCEDURE — 99204 OFFICE O/P NEW MOD 45 MIN: CPT

## 2025-08-07 PROCEDURE — 3074F SYST BP LT 130 MM HG: CPT

## 2025-08-07 PROCEDURE — G8427 DOCREV CUR MEDS BY ELIG CLIN: HCPCS

## 2025-08-07 RX ORDER — HYDROCHLOROTHIAZIDE 25 MG/1
25 TABLET ORAL DAILY
COMMUNITY
Start: 2025-07-31

## 2025-08-07 RX ORDER — PREDNISONE 20 MG/1
20 TABLET ORAL 2 TIMES DAILY
Qty: 10 TABLET | Refills: 0 | Status: SHIPPED | OUTPATIENT
Start: 2025-08-07 | End: 2025-08-12

## 2025-08-07 RX ORDER — ALBUTEROL SULFATE 90 UG/1
1 AEROSOL, METERED RESPIRATORY (INHALATION) EVERY 4 HOURS PRN
COMMUNITY
Start: 2025-05-02

## 2025-08-07 RX ORDER — LOSARTAN POTASSIUM 100 MG/1
100 TABLET ORAL DAILY
COMMUNITY
Start: 2025-07-01

## 2025-08-07 RX ORDER — AZITHROMYCIN 250 MG/1
TABLET, FILM COATED ORAL
Qty: 6 TABLET | Refills: 0 | Status: SHIPPED | OUTPATIENT
Start: 2025-08-07 | End: 2025-08-17

## 2025-08-07 SDOH — ECONOMIC STABILITY: FOOD INSECURITY: WITHIN THE PAST 12 MONTHS, YOU WORRIED THAT YOUR FOOD WOULD RUN OUT BEFORE YOU GOT MONEY TO BUY MORE.: NEVER TRUE

## 2025-08-07 SDOH — ECONOMIC STABILITY: FOOD INSECURITY: WITHIN THE PAST 12 MONTHS, THE FOOD YOU BOUGHT JUST DIDN'T LAST AND YOU DIDN'T HAVE MONEY TO GET MORE.: NEVER TRUE

## 2025-08-07 ASSESSMENT — PATIENT HEALTH QUESTIONNAIRE - PHQ9
SUM OF ALL RESPONSES TO PHQ QUESTIONS 1-9: 0
1. LITTLE INTEREST OR PLEASURE IN DOING THINGS: NOT AT ALL
SUM OF ALL RESPONSES TO PHQ QUESTIONS 1-9: 0
2. FEELING DOWN, DEPRESSED OR HOPELESS: NOT AT ALL

## 2025-08-07 ASSESSMENT — ENCOUNTER SYMPTOMS
COUGH: 1
SHORTNESS OF BREATH: 1
VOMITING: 0
NAUSEA: 0
ABDOMINAL PAIN: 0
DIARRHEA: 0

## 2025-08-14 ENCOUNTER — OFFICE VISIT (OUTPATIENT)
Dept: INTERNAL MEDICINE | Age: 80
End: 2025-08-14
Payer: MEDICARE

## 2025-08-14 VITALS
DIASTOLIC BLOOD PRESSURE: 54 MMHG | TEMPERATURE: 97.6 F | BODY MASS INDEX: 24.68 KG/M2 | SYSTOLIC BLOOD PRESSURE: 108 MMHG | WEIGHT: 172 LBS

## 2025-08-14 DIAGNOSIS — F17.200 SMOKER: ICD-10-CM

## 2025-08-14 DIAGNOSIS — I10 ESSENTIAL HYPERTENSION: Primary | ICD-10-CM

## 2025-08-14 DIAGNOSIS — Z13.0 SCREENING FOR ENDOCRINE, NUTRITIONAL, METABOLIC AND IMMUNITY DISORDER: ICD-10-CM

## 2025-08-14 DIAGNOSIS — Z13.228 SCREENING FOR ENDOCRINE, NUTRITIONAL, METABOLIC AND IMMUNITY DISORDER: ICD-10-CM

## 2025-08-14 DIAGNOSIS — J44.1 COPD EXACERBATION (HCC): ICD-10-CM

## 2025-08-14 DIAGNOSIS — Z13.29 SCREENING FOR ENDOCRINE, NUTRITIONAL, METABOLIC AND IMMUNITY DISORDER: ICD-10-CM

## 2025-08-14 DIAGNOSIS — Z13.21 SCREENING FOR ENDOCRINE, NUTRITIONAL, METABOLIC AND IMMUNITY DISORDER: ICD-10-CM

## 2025-08-14 PROCEDURE — 1159F MED LIST DOCD IN RCRD: CPT

## 2025-08-14 PROCEDURE — G8427 DOCREV CUR MEDS BY ELIG CLIN: HCPCS

## 2025-08-14 PROCEDURE — G8420 CALC BMI NORM PARAMETERS: HCPCS

## 2025-08-14 PROCEDURE — 1123F ACP DISCUSS/DSCN MKR DOCD: CPT

## 2025-08-14 PROCEDURE — 4004F PT TOBACCO SCREEN RCVD TLK: CPT

## 2025-08-14 PROCEDURE — 99214 OFFICE O/P EST MOD 30 MIN: CPT

## 2025-08-14 PROCEDURE — 1160F RVW MEDS BY RX/DR IN RCRD: CPT

## 2025-08-14 PROCEDURE — 3074F SYST BP LT 130 MM HG: CPT

## 2025-08-14 PROCEDURE — 3023F SPIROM DOC REV: CPT

## 2025-08-14 PROCEDURE — 3078F DIAST BP <80 MM HG: CPT

## 2025-08-14 RX ORDER — LOSARTAN POTASSIUM 100 MG/1
100 TABLET ORAL DAILY
Qty: 90 TABLET | Refills: 1 | Status: SHIPPED | OUTPATIENT
Start: 2025-08-14

## 2025-08-14 RX ORDER — HYDROCHLOROTHIAZIDE 12.5 MG/1
12.5 CAPSULE ORAL EVERY MORNING
Qty: 90 CAPSULE | Refills: 1 | Status: SHIPPED | OUTPATIENT
Start: 2025-08-14 | End: 2026-02-10

## 2025-08-14 ASSESSMENT — ENCOUNTER SYMPTOMS
ABDOMINAL PAIN: 0
NAUSEA: 0
VOMITING: 0
SHORTNESS OF BREATH: 0
DIARRHEA: 0

## (undated) DEVICE — GLOVE SURG SZ 7 L12IN FNGR THK79MIL GRN LTX FREE

## (undated) DEVICE — TUBE ET 7.5MM NSL ORAL BASIC CUF INTMED MURPHY EYE RADPQ

## (undated) DEVICE — DRESSING BORDERED ADH GZ UNIV GEN USE 8INX4IN AND 6INX2IN

## (undated) DEVICE — GLOVE SURG SZ 8 L12IN FNGR THK79MIL GRN LTX FREE

## (undated) DEVICE — GUIDEWIRE ORTH L400MM DIA3.2MM FOR TFN

## (undated) DEVICE — GLOVE SURG SZ 75 L12IN FNGR THK94MIL TRNSLUC YEL LTX

## (undated) DEVICE — C-ARM: Brand: UNBRANDED

## (undated) DEVICE — COVER POS PERINL POST NS 082501

## (undated) DEVICE — 6617 IOBAN II PATIENT ISOLATION DRAPE 5/BX,4BX/CS: Brand: STERI-DRAPE™ IOBAN™ 2

## (undated) DEVICE — BIT DRL L500MM DIA6X9MM CANN STP L QUIK CPL FOR DH DC TFN

## (undated) DEVICE — SOLUTION IV IRRIG POUR BRL 0.9% SODIUM CHL 2F7124

## (undated) DEVICE — BIT DRL L145MM DIA4.2MM NONSTERILE 3 FLUT NDL PNT QUIK CPL

## (undated) DEVICE — SUTURE VCRL SZ 2-0 L36IN ABSRB UD L36MM CT-1 1/2 CIR J945H

## (undated) DEVICE — SUTURE VCRL SZ 0 L27IN ABSRB UD L36MM CT-1 1/2 CIR J260H

## (undated) DEVICE — Z DISCONTINUED USE 2272117 DRAPE SURG 3 QTR N INVASIVE 2 LAYR DISP

## (undated) DEVICE — SURGICAL PROCEDURE PACK LOWER EXTREMITY LOURDES HOSP

## (undated) DEVICE — CURAVIEW LED LARYNSCP BLDE

## (undated) DEVICE — C-ARMOR C-ARM EQUIPMENT COVERS CLEAR STERILE UNIVERSAL FIT 12 PER CASE: Brand: C-ARMOR

## (undated) DEVICE — Z DISCONTINUED USE 2429233 DRESSING FOAM W10XL10CM 5 LAYR SELF ADH VERSATILE SAFETAC

## (undated) DEVICE — Device